# Patient Record
Sex: FEMALE | Race: ASIAN | Employment: OTHER | URBAN - METROPOLITAN AREA
[De-identification: names, ages, dates, MRNs, and addresses within clinical notes are randomized per-mention and may not be internally consistent; named-entity substitution may affect disease eponyms.]

---

## 2017-02-02 ENCOUNTER — TELEPHONE (OUTPATIENT)
Dept: FAMILY MEDICINE CLINIC | Facility: CLINIC | Age: 82
End: 2017-02-02

## 2017-06-19 ENCOUNTER — TELEPHONE (OUTPATIENT)
Dept: FAMILY MEDICINE CLINIC | Facility: CLINIC | Age: 82
End: 2017-06-19

## 2017-07-19 ENCOUNTER — TELEPHONE (OUTPATIENT)
Dept: FAMILY MEDICINE CLINIC | Facility: CLINIC | Age: 82
End: 2017-07-19

## 2017-09-20 ENCOUNTER — OFFICE VISIT (OUTPATIENT)
Dept: FAMILY MEDICINE CLINIC | Facility: CLINIC | Age: 82
End: 2017-09-20

## 2017-09-20 VITALS
BODY MASS INDEX: 32.79 KG/M2 | WEIGHT: 156.19 LBS | HEART RATE: 96 BPM | DIASTOLIC BLOOD PRESSURE: 84 MMHG | TEMPERATURE: 98 F | SYSTOLIC BLOOD PRESSURE: 126 MMHG | OXYGEN SATURATION: 99 % | HEIGHT: 58 IN | RESPIRATION RATE: 17 BRPM

## 2017-09-20 DIAGNOSIS — R00.0 TACHYCARDIA: ICD-10-CM

## 2017-09-20 DIAGNOSIS — S33.5XXA LUMBAR SPRAIN, INITIAL ENCOUNTER: Primary | ICD-10-CM

## 2017-09-20 DIAGNOSIS — K21.9 CHRONIC GERD: ICD-10-CM

## 2017-09-20 DIAGNOSIS — R26.9 GAIT ABNORMALITY: ICD-10-CM

## 2017-09-20 PROCEDURE — 99214 OFFICE O/P EST MOD 30 MIN: CPT | Performed by: FAMILY MEDICINE

## 2017-09-20 RX ORDER — CYCLOBENZAPRINE HCL 10 MG
5 TABLET ORAL 2 TIMES DAILY PRN
Qty: 30 TABLET | Refills: 0 | Status: SHIPPED | OUTPATIENT
Start: 2017-09-20 | End: 2017-10-10

## 2017-09-20 RX ORDER — PREDNISONE 1 MG/1
10 TABLET ORAL DAILY
COMMUNITY
End: 2018-08-24

## 2017-09-20 RX ORDER — IBUPROFEN 400 MG/1
400 TABLET ORAL EVERY 8 HOURS PRN
COMMUNITY
End: 2018-08-24

## 2017-09-20 NOTE — PROGRESS NOTES
Diane Downing is a [de-identified]year old female. HPI:  Patient presents with her daughter. Complains of left lower back pain for 3 weeks. Acute onset. Noted initially when she was trying to get up from a sitting position from a chair.  Tried some local heat i Smokeless tobacco: Never Used                      Alcohol use: No                            REVIEW OF SYSTEMS:  GENERAL HEALTH: feels well otherwise, mood is good  SKIN: denies any unusual skin lesions or rashes  RESPIRATORY: denies shortness home, advised to use walker for safety until pain is improved. Reviewed back maintenance. Patient lives by herself. Is a taxing effort to leave her home. Not able to drive due to pain.   will arrange for home health for skilled nursing evaluation and ho

## 2017-09-20 NOTE — TELEPHONE ENCOUNTER
Refused Prescriptions Disp Refills    DICLOFENAC SODIUM 50 MG Oral Tab EC [Pharmacy Med Name: DICLOFENAC SODIUM 50MG DR TABLETS] 180 tablet 0      Sig: TAKE 1 TABLET BY MOUTH TWICE DAILY FOR 1 WEEK, THEN TWICE DAILY AS NEEDED( TAKE WITH FOOD)        Refu

## 2017-09-21 ENCOUNTER — MED REC SCAN ONLY (OUTPATIENT)
Dept: FAMILY MEDICINE CLINIC | Facility: CLINIC | Age: 82
End: 2017-09-21

## 2017-09-21 ENCOUNTER — HOSPITAL ENCOUNTER (OUTPATIENT)
Dept: GENERAL RADIOLOGY | Age: 82
Discharge: HOME | End: 2017-09-21
Attending: FAMILY MEDICINE
Payer: MEDICARE

## 2017-09-21 ENCOUNTER — TELEPHONE (OUTPATIENT)
Dept: FAMILY MEDICINE CLINIC | Facility: CLINIC | Age: 82
End: 2017-09-21

## 2017-09-21 DIAGNOSIS — S33.5XXA LUMBAR SPRAIN, INITIAL ENCOUNTER: ICD-10-CM

## 2017-09-21 PROCEDURE — 72110 X-RAY EXAM L-2 SPINE 4/>VWS: CPT | Performed by: FAMILY MEDICINE

## 2017-09-21 NOTE — TELEPHONE ENCOUNTER
Faxed face sheet, last progress note and order for home health to [facility] home health fax # [phone]

## 2017-09-26 ENCOUNTER — TELEPHONE (OUTPATIENT)
Dept: FAMILY MEDICINE CLINIC | Facility: CLINIC | Age: 82
End: 2017-09-26

## 2017-09-27 NOTE — TELEPHONE ENCOUNTER
Spoke with patient's son, advised on results of lumbar spine x-rays consistent with moderate degenerative changes, most notable at L4-5 and L5-S1. No fx  Medications prescribed are helping. Patient is feeling better.   Physical therapy also seems to be hel

## 2017-10-02 NOTE — TELEPHONE ENCOUNTER
Pending Prescriptions Disp Refills    DICLOFENAC SODIUM 50 MG Oral Tab EC [Pharmacy Med Name: DICLOFENAC SODIUM 50MG DR TABLETS] 30 tablet 0     Sig: TAKE 1 TABLET BY MOUTH TWICE DAILY FOR 1 WEEK, THEN TWICE DAILY AS NEEDED( TAKE WITH FOOD)       Lov9/20

## 2017-10-03 NOTE — TELEPHONE ENCOUNTER
Lmtcb, when patient calls back will ask if she is requesting refill of cyclobenzaprine or if this is an automatic refill

## 2017-10-03 NOTE — TELEPHONE ENCOUNTER
Refused Prescriptions Disp Refills    DICLOFENAC SODIUM 50 MG Oral Tab EC [Pharmacy Med Name: DICLOFENAC SODIUM 50MG DR TABLETS] 180 tablet 0      Sig: TAKE 1 TABLET(50 MG) BY MOUTH TWICE DAILY AS NEEDED        Refused By: Trang Reeder        Reason

## 2017-10-06 RX ORDER — CYCLOBENZAPRINE HCL 10 MG
TABLET ORAL
Qty: 30 TABLET | Refills: 0 | OUTPATIENT
Start: 2017-10-06

## 2017-10-23 NOTE — TELEPHONE ENCOUNTER
Pending Prescriptions Disp Refills    DICLOFENAC SODIUM 50 MG Oral Tab EC [Pharmacy Med Name: DICLOFENAC SODIUM 50MG DR TABLETS] 40 tablet 0     Sig: TAKE 1 TABLET(50 MG) BY MOUTH TWICE DAILY AS NEEDED       refill denied as this was just filled 10/2/201

## 2018-05-31 ENCOUNTER — TELEPHONE (OUTPATIENT)
Dept: FAMILY MEDICINE CLINIC | Facility: CLINIC | Age: 83
End: 2018-05-31

## 2018-08-24 ENCOUNTER — LAB ENCOUNTER (OUTPATIENT)
Dept: LAB | Age: 83
End: 2018-08-24
Attending: FAMILY MEDICINE
Payer: MEDICARE

## 2018-08-24 ENCOUNTER — OFFICE VISIT (OUTPATIENT)
Dept: FAMILY MEDICINE CLINIC | Facility: CLINIC | Age: 83
End: 2018-08-24
Payer: MEDICARE

## 2018-08-24 VITALS
DIASTOLIC BLOOD PRESSURE: 72 MMHG | RESPIRATION RATE: 18 BRPM | HEIGHT: 58 IN | OXYGEN SATURATION: 98 % | HEART RATE: 108 BPM | SYSTOLIC BLOOD PRESSURE: 140 MMHG | WEIGHT: 175 LBS | TEMPERATURE: 98 F | BODY MASS INDEX: 36.73 KG/M2

## 2018-08-24 DIAGNOSIS — K21.9 CHRONIC GERD: ICD-10-CM

## 2018-08-24 DIAGNOSIS — M54.50 CHRONIC BILATERAL LOW BACK PAIN WITHOUT SCIATICA: ICD-10-CM

## 2018-08-24 DIAGNOSIS — R60.0 EDEMA OF RIGHT LOWER EXTREMITY: ICD-10-CM

## 2018-08-24 DIAGNOSIS — Z78.0 POSTMENOPAUSAL: ICD-10-CM

## 2018-08-24 DIAGNOSIS — Z13.31 DEPRESSION SCREENING: ICD-10-CM

## 2018-08-24 DIAGNOSIS — E78.49 OTHER HYPERLIPIDEMIA: ICD-10-CM

## 2018-08-24 DIAGNOSIS — Z00.00 ENCOUNTER FOR ANNUAL HEALTH EXAMINATION: Primary | ICD-10-CM

## 2018-08-24 DIAGNOSIS — M17.0 BILATERAL PRIMARY OSTEOARTHRITIS OF KNEE: ICD-10-CM

## 2018-08-24 DIAGNOSIS — E55.9 VITAMIN D DEFICIENCY: ICD-10-CM

## 2018-08-24 DIAGNOSIS — M77.8 RIGHT SHOULDER TENDINITIS: ICD-10-CM

## 2018-08-24 DIAGNOSIS — G89.29 CHRONIC BILATERAL LOW BACK PAIN WITHOUT SCIATICA: ICD-10-CM

## 2018-08-24 DIAGNOSIS — Z96.653 HISTORY OF TOTAL BILATERAL KNEE REPLACEMENT: ICD-10-CM

## 2018-08-24 DIAGNOSIS — I10 ESSENTIAL HYPERTENSION: ICD-10-CM

## 2018-08-24 DIAGNOSIS — H61.23 IMPACTED CERUMEN OF BOTH EARS: ICD-10-CM

## 2018-08-24 DIAGNOSIS — R26.9 ABNORMAL GAIT: ICD-10-CM

## 2018-08-24 DIAGNOSIS — H61.91 LESION OF SKIN OF RIGHT EAR: ICD-10-CM

## 2018-08-24 DIAGNOSIS — R63.5 WEIGHT GAIN: ICD-10-CM

## 2018-08-24 DIAGNOSIS — Z23 NEED FOR VACCINATION: ICD-10-CM

## 2018-08-24 DIAGNOSIS — R73.01 IFG (IMPAIRED FASTING GLUCOSE): ICD-10-CM

## 2018-08-24 DIAGNOSIS — M85.89 OSTEOPENIA OF MULTIPLE SITES: ICD-10-CM

## 2018-08-24 LAB
ALBUMIN SERPL-MCNC: 3.5 G/DL (ref 3.5–4.8)
ALBUMIN/GLOB SERPL: 0.8 {RATIO} (ref 1–2)
ALP LIVER SERPL-CCNC: 105 U/L (ref 55–142)
ALT SERPL-CCNC: 17 U/L (ref 14–54)
ANION GAP SERPL CALC-SCNC: 6 MMOL/L (ref 0–18)
AST SERPL-CCNC: 18 U/L (ref 15–41)
BASOPHILS # BLD AUTO: 0.05 X10(3) UL (ref 0–0.1)
BASOPHILS NFR BLD AUTO: 0.6 %
BILIRUB SERPL-MCNC: 0.3 MG/DL (ref 0.1–2)
BUN BLD-MCNC: 17 MG/DL (ref 8–20)
BUN/CREAT SERPL: 16 (ref 10–20)
CALCIUM BLD-MCNC: 9 MG/DL (ref 8.3–10.3)
CHLORIDE SERPL-SCNC: 105 MMOL/L (ref 101–111)
CHOLEST SMN-MCNC: 215 MG/DL (ref ?–200)
CO2 SERPL-SCNC: 28 MMOL/L (ref 22–32)
CREAT BLD-MCNC: 1.06 MG/DL (ref 0.55–1.02)
EOSINOPHIL # BLD AUTO: 0.23 X10(3) UL (ref 0–0.3)
EOSINOPHIL NFR BLD AUTO: 2.7 %
ERYTHROCYTE [DISTWIDTH] IN BLOOD BY AUTOMATED COUNT: 13.6 % (ref 11.5–16)
EST. AVERAGE GLUCOSE BLD GHB EST-MCNC: 137 MG/DL (ref 68–126)
GLOBULIN PLAS-MCNC: 4.3 G/DL (ref 2.5–4)
GLUCOSE BLD-MCNC: 94 MG/DL (ref 70–99)
HBA1C MFR BLD HPLC: 6.4 % (ref ?–5.7)
HCT VFR BLD AUTO: 47 % (ref 34–50)
HDLC SERPL-MCNC: 64 MG/DL (ref 40–59)
HGB BLD-MCNC: 14.5 G/DL (ref 12–16)
IMMATURE GRANULOCYTE COUNT: 0.03 X10(3) UL (ref 0–1)
IMMATURE GRANULOCYTE RATIO %: 0.3 %
LDLC SERPL CALC-MCNC: 118 MG/DL (ref ?–100)
LYMPHOCYTES # BLD AUTO: 2.3 X10(3) UL (ref 0.9–4)
LYMPHOCYTES NFR BLD AUTO: 26.6 %
M PROTEIN MFR SERPL ELPH: 7.8 G/DL (ref 6.1–8.3)
MCH RBC QN AUTO: 29.1 PG (ref 27–33.2)
MCHC RBC AUTO-ENTMCNC: 30.9 G/DL (ref 31–37)
MCV RBC AUTO: 94.4 FL (ref 81–100)
MONOCYTES # BLD AUTO: 0.73 X10(3) UL (ref 0.1–1)
MONOCYTES NFR BLD AUTO: 8.4 %
NEUTROPHIL ABS PRELIM: 5.32 X10 (3) UL (ref 1.3–6.7)
NEUTROPHILS # BLD AUTO: 5.32 X10(3) UL (ref 1.3–6.7)
NEUTROPHILS NFR BLD AUTO: 61.4 %
NONHDLC SERPL-MCNC: 151 MG/DL (ref ?–130)
OSMOLALITY SERPL CALC.SUM OF ELEC: 289 MOSM/KG (ref 275–295)
PLATELET # BLD AUTO: 252 10(3)UL (ref 150–450)
POTASSIUM SERPL-SCNC: 4.2 MMOL/L (ref 3.6–5.1)
RBC # BLD AUTO: 4.98 X10(6)UL (ref 3.8–5.1)
RED CELL DISTRIBUTION WIDTH-SD: 46.9 FL (ref 35.1–46.3)
SODIUM SERPL-SCNC: 139 MMOL/L (ref 136–144)
TRIGL SERPL-MCNC: 166 MG/DL (ref 30–149)
TSI SER-ACNC: 0.96 MIU/ML (ref 0.35–5.5)
VIT D+METAB SERPL-MCNC: 12.7 NG/ML (ref 30–100)
VLDLC SERPL CALC-MCNC: 33 MG/DL (ref 0–30)
WBC # BLD AUTO: 8.7 X10(3) UL (ref 4–13)

## 2018-08-24 PROCEDURE — 82306 VITAMIN D 25 HYDROXY: CPT

## 2018-08-24 PROCEDURE — 84443 ASSAY THYROID STIM HORMONE: CPT

## 2018-08-24 PROCEDURE — 80061 LIPID PANEL: CPT

## 2018-08-24 PROCEDURE — 90670 PCV13 VACCINE IM: CPT | Performed by: FAMILY MEDICINE

## 2018-08-24 PROCEDURE — G0444 DEPRESSION SCREEN ANNUAL: HCPCS | Performed by: FAMILY MEDICINE

## 2018-08-24 PROCEDURE — 99214 OFFICE O/P EST MOD 30 MIN: CPT | Performed by: FAMILY MEDICINE

## 2018-08-24 PROCEDURE — 80053 COMPREHEN METABOLIC PANEL: CPT

## 2018-08-24 PROCEDURE — 85025 COMPLETE CBC W/AUTO DIFF WBC: CPT

## 2018-08-24 PROCEDURE — 69210 REMOVE IMPACTED EAR WAX UNI: CPT | Performed by: FAMILY MEDICINE

## 2018-08-24 PROCEDURE — G0009 ADMIN PNEUMOCOCCAL VACCINE: HCPCS | Performed by: FAMILY MEDICINE

## 2018-08-24 PROCEDURE — G0439 PPPS, SUBSEQ VISIT: HCPCS | Performed by: FAMILY MEDICINE

## 2018-08-24 PROCEDURE — 83036 HEMOGLOBIN GLYCOSYLATED A1C: CPT

## 2018-08-24 RX ORDER — METOPROLOL SUCCINATE 100 MG/1
50 TABLET, EXTENDED RELEASE ORAL DAILY
Refills: 0 | COMMUNITY
Start: 2018-08-24 | End: 2019-07-07

## 2018-08-24 RX ORDER — HYDROCHLOROTHIAZIDE 12.5 MG/1
12.5 TABLET ORAL DAILY
Qty: 90 TABLET | Refills: 1 | Status: SHIPPED | OUTPATIENT
Start: 2018-08-24 | End: 2019-08-19

## 2018-08-24 RX ORDER — METOPROLOL TARTRATE 50 MG/1
50 TABLET, FILM COATED ORAL 2 TIMES DAILY
COMMUNITY
End: 2018-10-24

## 2018-08-24 RX ORDER — LORATADINE 10 MG/1
10 TABLET ORAL DAILY
COMMUNITY
End: 2020-09-29

## 2018-08-24 RX ORDER — MELOXICAM 15 MG/1
15 TABLET ORAL DAILY PRN
COMMUNITY

## 2018-08-24 NOTE — PATIENT INSTRUCTIONS
Sultana Hess's SCREENING SCHEDULE   Tests on this list are recommended by your physician but may not be covered, or covered at this frequency, by your insurer. Please check with your insurance carrier before scheduling to verify coverage.    Catherine Sinclair one of the following criteria:   • Men who are 73-68 years old and have smoked more than 100 cigarettes in their lifetime   • Anyone with a family history    Colorectal Cancer Screening  Covered up to Age 76     Colonoscopy Screen   Covered every 10 years- Please get this Mammogram regularly   Immunizations      Influenza  Covered Annually No orders found for this or any previous visit.  Please get every year    Pneumococcal 13 (Prevnar)  Covered Once after 65   Orders placed or performed in visit on 08/24/18 Directives.

## 2018-08-24 NOTE — PROGRESS NOTES
HPI:   Nelly Desouza is a 82year old female who presents for a Medicare Subsequent Annual Wellness visit (Pt already had Initial Annual Wellness).     Pt with h/o bilat TKR  Last was R TKR 2 years ago  C/o swelling in R foot, ankle and R lower leg fo as listed below:  She has no issues with dressing and bathing based on screening of functional status. She has Driving difficulties based on screening of functional status.    Driving: Cannot do without help   She has Meal Preparation difficulties ba MD (SURGERY, ORTHOPEDIC)    Patient Active Problem List:     Hyperlipidemia     Chronic GERD     IFG (impaired fasting glucose)     Bilateral primary osteoarthritis of knee     OAB (overactive bladder)     H/O total knee replacement     Status post total l fatigue  SKIN: has lesion on R ear for about 2 years, thinks bigger in size, no bleeding.    HEENT: ears feel plugged at times, runny nose  RESPIRATORY: denies shortness of breath with exertion, no cough  CARDIOVASCULAR: denies chest pain on exertion  GI: d auscultation bilaterally, respirations unlabored   Heart:  Regular rate and rhythm, S1 and S2 normal, no murmur, rub, or gallop   Abdomen:   Soft,obese, non-tender, bowel sounds active all four quadrants,  no masses, no organomegaly   Pelvic: Deferred   Ex indication. Administered Prevnar 13 today. -     PNEUMOCOCCAL VACC, 13 XAVI IM    Essential hypertension  Noted patient started on metoprolol per family doctor for elevated blood pressure readings about 1-2 months ago.   Reviewed diet/exercise/blood  pressur alone  States will d/w family and consider home health care    Vitamin D deficiency  rec Vitamin D3 2000 IU daily  -     VITAMIN D, 25-HYDROXY; Future    Other hyperlipidemia  No meds. Monitor w/ diet/exercise  -     LIPID PANEL;  Future    Weight gain  Lik Physical Exam only, or if medically necessary Electrocardiogram date06/15/2016       Colorectal Cancer Screening      Colonoscopy Screen every 10 years Pt deferred.  Reviewed indication Update Health Maintenance if applicable    Flex Sigmoidoscopy Screen ev Medicare Part B No vaccine history found This may be covered with your prescription benefits, but Medicare does not cover unless Medically needed    Zoster  Not covered by Medicare Part B  recommend shingles vaccine series (Shigrix) to be done at local pha

## 2018-09-14 RX ORDER — ERGOCALCIFEROL 1.25 MG/1
50000 CAPSULE ORAL WEEKLY
Qty: 12 CAPSULE | Refills: 0 | Status: SHIPPED | OUTPATIENT
Start: 2018-09-14 | End: 2018-12-13

## 2018-09-19 ENCOUNTER — TELEPHONE (OUTPATIENT)
Dept: FAMILY MEDICINE CLINIC | Facility: CLINIC | Age: 83
End: 2018-09-19

## 2018-09-19 NOTE — TELEPHONE ENCOUNTER
Faxed script with face-sheet. Conformation received. Supa Chavez, 09/19/18, 10:51 AM     Sent to scanning.

## 2018-09-19 NOTE — TELEPHONE ENCOUNTER
Spoke with son.  States LE is improved w/ HCTZ  Advised on recent labs   Labs show normal blood sugar.  Hemoglobin A1c for 3 month blood sugar average is elevated at 6.4, still in the prediabetes range, but very close to diabetic range. Monty Ramirez

## 2018-09-24 ENCOUNTER — TELEPHONE (OUTPATIENT)
Dept: FAMILY MEDICINE CLINIC | Facility: CLINIC | Age: 83
End: 2018-09-24

## 2018-09-24 NOTE — TELEPHONE ENCOUNTER
Copy of most recent office notes (8/24/18) sent to [facility] per their request for new referral.

## 2018-09-25 ENCOUNTER — TELEPHONE (OUTPATIENT)
Dept: FAMILY MEDICINE CLINIC | Facility: CLINIC | Age: 83
End: 2018-09-25

## 2018-09-25 NOTE — TELEPHONE ENCOUNTER
Nurse called back. Needs verbal orders asap. 3rd time she has called to confirm we have the message in to the Dr.    Asked if she can fax orders over - and she said not until she gets verbal to fill in the blanks on the Order Form. Pls Call.

## 2018-09-25 NOTE — TELEPHONE ENCOUNTER
Gave verbal orders. Informed by Luis Enrique Jama patient is able to do a lot for herself. Was able to reach under a table for her weights. May not be able to be seen with Medicare. But will see her as much as allowed. Luis Enrique Jama feels that the patient may be lonely.

## 2018-09-26 ENCOUNTER — TELEPHONE (OUTPATIENT)
Dept: FAMILY MEDICINE CLINIC | Facility: CLINIC | Age: 83
End: 2018-09-26

## 2018-09-26 NOTE — TELEPHONE ENCOUNTER
Faxed over Orders for [facility]- fax number [phone]. Conformation received.

## 2018-10-04 ENCOUNTER — TELEPHONE (OUTPATIENT)
Dept: FAMILY MEDICINE CLINIC | Facility: CLINIC | Age: 83
End: 2018-10-04

## 2018-10-04 NOTE — TELEPHONE ENCOUNTER
Cheyanne from [facility] is discharging Regino from [facility].

## 2018-10-10 ENCOUNTER — TELEPHONE (OUTPATIENT)
Dept: FAMILY MEDICINE CLINIC | Facility: CLINIC | Age: 83
End: 2018-10-10

## 2018-10-10 NOTE — TELEPHONE ENCOUNTER
[facility] for patient to advise Dr. Nelsy Guzman would like her to try taking the Hydrochlorothiazide medication every other day, instead of daily. Advised this may help with her swelling, but decrease so many trips to the BR.

## 2018-10-10 NOTE — TELEPHONE ENCOUNTER
VMML to request encouraging the patient to at least try taking the hydrochlorothiazide medication every other day instead of not taking it altogether. Advised a message was left for the patient also.

## 2018-10-24 ENCOUNTER — OFFICE VISIT (OUTPATIENT)
Dept: FAMILY MEDICINE CLINIC | Facility: CLINIC | Age: 83
End: 2018-10-24
Payer: MEDICARE

## 2018-10-24 VITALS
HEIGHT: 58 IN | SYSTOLIC BLOOD PRESSURE: 140 MMHG | DIASTOLIC BLOOD PRESSURE: 70 MMHG | BODY MASS INDEX: 36.94 KG/M2 | OXYGEN SATURATION: 97 % | RESPIRATION RATE: 18 BRPM | WEIGHT: 176 LBS | HEART RATE: 90 BPM | TEMPERATURE: 98 F

## 2018-10-24 DIAGNOSIS — K21.9 CHRONIC GERD: ICD-10-CM

## 2018-10-24 DIAGNOSIS — H53.10 SUBJECTIVE VISION DISTURBANCE, BILATERAL: ICD-10-CM

## 2018-10-24 DIAGNOSIS — I87.2 VENOUS INSUFFICIENCY OF RIGHT LOWER EXTREMITY: ICD-10-CM

## 2018-10-24 DIAGNOSIS — M15.9 PRIMARY OSTEOARTHRITIS INVOLVING MULTIPLE JOINTS: ICD-10-CM

## 2018-10-24 DIAGNOSIS — E78.2 MIXED HYPERLIPIDEMIA: ICD-10-CM

## 2018-10-24 DIAGNOSIS — M77.8 RIGHT SHOULDER TENDINITIS: ICD-10-CM

## 2018-10-24 DIAGNOSIS — R73.03 PREDIABETES: ICD-10-CM

## 2018-10-24 DIAGNOSIS — I10 ESSENTIAL HYPERTENSION: Primary | ICD-10-CM

## 2018-10-24 DIAGNOSIS — E55.9 VITAMIN D DEFICIENCY: ICD-10-CM

## 2018-10-24 PROCEDURE — 99214 OFFICE O/P EST MOD 30 MIN: CPT | Performed by: FAMILY MEDICINE

## 2018-10-24 PROCEDURE — 20610 DRAIN/INJ JOINT/BURSA W/O US: CPT | Performed by: FAMILY MEDICINE

## 2018-10-24 RX ORDER — IPRATROPIUM BROMIDE 42 UG/1
SPRAY, METERED NASAL
Refills: 10 | COMMUNITY
Start: 2018-09-17 | End: 2020-09-29

## 2018-10-24 RX ORDER — METOPROLOL SUCCINATE 100 MG/1
100 TABLET, EXTENDED RELEASE ORAL DAILY
Refills: 0 | Status: CANCELLED | OUTPATIENT
Start: 2018-10-24

## 2018-10-24 NOTE — PROGRESS NOTES
Nelly Desouza is a 82year old female.   HPI:  Pt is here with her, Dr. Kevin Guzman  Overall feeling better vs previous ov  R foot/ankle and R lower leg swelling is improved vs previous and HCTZ for a few days helped but then pt stopped med due to polyuria  us hypertension    • Hyperlipidemia    • Obesity    • Osteoarthritis        Past Surgical History:   Procedure Laterality Date   • KNEE REPLACEMENT SURGERY Bilateral    •            Social History    Tobacco Use      Smoking status: Never Smoker      Smok 12.5 mg daily. taking hydrochlorothiazide once daily will help with blood pressure as well as lower extremity edema.     2. Right shoulder tendinitis  Patient with significant limitation in range of motion of right shoulder, associated with pain, limiting daily    Advised annual flu vaccine, defers

## 2018-11-02 ENCOUNTER — TELEPHONE (OUTPATIENT)
Dept: FAMILY MEDICINE CLINIC | Facility: CLINIC | Age: 83
End: 2018-11-02

## 2018-11-02 NOTE — TELEPHONE ENCOUNTER
Ary RN from [facility] called to notify  that she saw pt today.   Notes bilateral edema, feet are somewhat bluish and cold to the touch    Pt has been non-compliant with her HCTZ but is trying to take more regularly this week    RN notes that pt can't get

## 2018-11-05 NOTE — TELEPHONE ENCOUNTER
Returned call to [facility]. Advised to encourage compliance with HCTZ daily. Verbal order given to continue OT/PT. Advised to fax order if they need it signed.

## 2018-11-06 RX ORDER — TRIAMCINOLONE ACETONIDE 40 MG/ML
40 INJECTION, SUSPENSION INTRA-ARTICULAR; INTRAMUSCULAR ONCE
Status: COMPLETED | OUTPATIENT
Start: 2018-10-24 | End: 2018-10-24

## 2018-12-12 ENCOUNTER — TELEPHONE (OUTPATIENT)
Dept: FAMILY MEDICINE CLINIC | Facility: CLINIC | Age: 83
End: 2018-12-12

## 2018-12-12 NOTE — TELEPHONE ENCOUNTER
LOV 10/24    Pt has been compliant with her HCTZ but is trying to take more regularly this week. She cordero gained weight. 111/30 172.8 12/ 3 174  Today 175.6 lb     Bilateral 1+ edema. Please advise.  Thank You

## 2018-12-14 NOTE — TELEPHONE ENCOUNTER
S/w son. States he was with pt yesterday and she states that she has been taking the hydrochlorothiazide daily. Also commented that her lower extremity edema was improved. Advised regarding weight gain.   Recommend compression stockings during the daytime

## 2018-12-19 NOTE — TELEPHONE ENCOUNTER
Please let Ary, Home Health nurse know that I s/w pt's son,. Dr. Caleb Valverde as per  Previous message. He will make sure patient takes her hydrochlorothiazide daily. Also discussed compression stockings for patient to use daily.   I also spoke with the robe

## 2018-12-19 NOTE — TELEPHONE ENCOUNTER
Detailed VMML for Sara Ramirez, [facility] nurse, regarding Dr. Watson Patience discussion with patient and her son. Patient needs to be compliant with HCTZ, elevate legs and try compression stockings.

## 2019-07-01 ENCOUNTER — TELEPHONE (OUTPATIENT)
Dept: FAMILY MEDICINE CLINIC | Facility: CLINIC | Age: 84
End: 2019-07-01

## 2019-07-01 NOTE — TELEPHONE ENCOUNTER
Called and spoke to patient's daughter for detail on condition. States swelling has been going on for several months. Noted recently one side is worse than the other and is bluish in color.   He mother has been on HCTZ, but stops taking it sometimes due t

## 2019-07-02 ENCOUNTER — LAB ENCOUNTER (OUTPATIENT)
Dept: LAB | Age: 84
End: 2019-07-02
Attending: FAMILY MEDICINE
Payer: MEDICARE

## 2019-07-02 ENCOUNTER — OFFICE VISIT (OUTPATIENT)
Dept: FAMILY MEDICINE CLINIC | Facility: CLINIC | Age: 84
End: 2019-07-02
Payer: MEDICARE

## 2019-07-02 VITALS
BODY MASS INDEX: 35.46 KG/M2 | SYSTOLIC BLOOD PRESSURE: 126 MMHG | DIASTOLIC BLOOD PRESSURE: 74 MMHG | RESPIRATION RATE: 16 BRPM | WEIGHT: 178.25 LBS | HEART RATE: 88 BPM | OXYGEN SATURATION: 96 % | HEIGHT: 59.45 IN | TEMPERATURE: 98 F

## 2019-07-02 DIAGNOSIS — R60.0 BILATERAL LEG EDEMA: ICD-10-CM

## 2019-07-02 DIAGNOSIS — I73.9 PERIPHERAL VASCULAR DISEASE (HCC): ICD-10-CM

## 2019-07-02 DIAGNOSIS — M47.896 OTHER OSTEOARTHRITIS OF SPINE, LUMBAR REGION: ICD-10-CM

## 2019-07-02 DIAGNOSIS — N32.81 OAB (OVERACTIVE BLADDER): ICD-10-CM

## 2019-07-02 DIAGNOSIS — E78.2 MIXED HYPERLIPIDEMIA: ICD-10-CM

## 2019-07-02 DIAGNOSIS — R60.0 BILATERAL LEG EDEMA: Primary | ICD-10-CM

## 2019-07-02 DIAGNOSIS — E55.9 VITAMIN D DEFICIENCY: ICD-10-CM

## 2019-07-02 DIAGNOSIS — R73.01 IFG (IMPAIRED FASTING GLUCOSE): ICD-10-CM

## 2019-07-02 DIAGNOSIS — M77.8 RIGHT SHOULDER TENDINITIS: ICD-10-CM

## 2019-07-02 DIAGNOSIS — I10 ESSENTIAL HYPERTENSION: ICD-10-CM

## 2019-07-02 DIAGNOSIS — M17.0 BILATERAL PRIMARY OSTEOARTHRITIS OF KNEE: ICD-10-CM

## 2019-07-02 DIAGNOSIS — R23.0 EXTREMITY CYANOSIS: ICD-10-CM

## 2019-07-02 LAB
ALBUMIN SERPL-MCNC: 3.8 G/DL (ref 3.4–5)
ALBUMIN/GLOB SERPL: 1 {RATIO} (ref 1–2)
ALP LIVER SERPL-CCNC: 93 U/L (ref 55–142)
ALT SERPL-CCNC: 15 U/L (ref 13–56)
ANION GAP SERPL CALC-SCNC: 7 MMOL/L (ref 0–18)
AST SERPL-CCNC: 16 U/L (ref 15–37)
BASOPHILS # BLD AUTO: 0.04 X10(3) UL (ref 0–0.2)
BASOPHILS NFR BLD AUTO: 0.5 %
BILIRUB SERPL-MCNC: 0.5 MG/DL (ref 0.1–2)
BUN BLD-MCNC: 18 MG/DL (ref 7–18)
BUN/CREAT SERPL: 15.8 (ref 10–20)
CALCIUM BLD-MCNC: 9.3 MG/DL (ref 8.5–10.1)
CHLORIDE SERPL-SCNC: 101 MMOL/L (ref 98–112)
CHOLEST SMN-MCNC: 199 MG/DL (ref ?–200)
CO2 SERPL-SCNC: 27 MMOL/L (ref 21–32)
CREAT BLD-MCNC: 1.14 MG/DL (ref 0.55–1.02)
DEPRECATED RDW RBC AUTO: 43.8 FL (ref 35.1–46.3)
EOSINOPHIL # BLD AUTO: 0.07 X10(3) UL (ref 0–0.7)
EOSINOPHIL NFR BLD AUTO: 0.8 %
ERYTHROCYTE [DISTWIDTH] IN BLOOD BY AUTOMATED COUNT: 12.8 % (ref 11–15)
EST. AVERAGE GLUCOSE BLD GHB EST-MCNC: 146 MG/DL (ref 68–126)
GLOBULIN PLAS-MCNC: 3.9 G/DL (ref 2.8–4.4)
GLUCOSE BLD-MCNC: 96 MG/DL (ref 70–99)
HBA1C MFR BLD HPLC: 6.7 % (ref ?–5.7)
HCT VFR BLD AUTO: 46.3 % (ref 35–48)
HDLC SERPL-MCNC: 68 MG/DL (ref 40–59)
HGB BLD-MCNC: 14.7 G/DL (ref 12–16)
IMM GRANULOCYTES # BLD AUTO: 0.03 X10(3) UL (ref 0–1)
IMM GRANULOCYTES NFR BLD: 0.3 %
LDLC SERPL CALC-MCNC: 110 MG/DL (ref ?–100)
LYMPHOCYTES # BLD AUTO: 2.79 X10(3) UL (ref 1–4)
LYMPHOCYTES NFR BLD AUTO: 31.6 %
M PROTEIN MFR SERPL ELPH: 7.7 G/DL (ref 6.4–8.2)
MCH RBC QN AUTO: 29.6 PG (ref 26–34)
MCHC RBC AUTO-ENTMCNC: 31.7 G/DL (ref 31–37)
MCV RBC AUTO: 93.3 FL (ref 80–100)
MONOCYTES # BLD AUTO: 0.75 X10(3) UL (ref 0.1–1)
MONOCYTES NFR BLD AUTO: 8.5 %
NEUTROPHILS # BLD AUTO: 5.16 X10 (3) UL (ref 1.5–7.7)
NEUTROPHILS # BLD AUTO: 5.16 X10(3) UL (ref 1.5–7.7)
NEUTROPHILS NFR BLD AUTO: 58.3 %
NONHDLC SERPL-MCNC: 131 MG/DL (ref ?–130)
OSMOLALITY SERPL CALC.SUM OF ELEC: 282 MOSM/KG (ref 275–295)
PLATELET # BLD AUTO: 250 10(3)UL (ref 150–450)
POTASSIUM SERPL-SCNC: 4.1 MMOL/L (ref 3.5–5.1)
RBC # BLD AUTO: 4.96 X10(6)UL (ref 3.8–5.3)
SODIUM SERPL-SCNC: 135 MMOL/L (ref 136–145)
TRIGL SERPL-MCNC: 105 MG/DL (ref 30–149)
TSI SER-ACNC: 1.48 MIU/ML (ref 0.36–3.74)
VLDLC SERPL CALC-MCNC: 21 MG/DL (ref 0–30)
WBC # BLD AUTO: 8.8 X10(3) UL (ref 4–11)

## 2019-07-02 PROCEDURE — 80061 LIPID PANEL: CPT

## 2019-07-02 PROCEDURE — 85025 COMPLETE CBC W/AUTO DIFF WBC: CPT

## 2019-07-02 PROCEDURE — 99214 OFFICE O/P EST MOD 30 MIN: CPT | Performed by: FAMILY MEDICINE

## 2019-07-02 PROCEDURE — 83036 HEMOGLOBIN GLYCOSYLATED A1C: CPT

## 2019-07-02 PROCEDURE — 84443 ASSAY THYROID STIM HORMONE: CPT

## 2019-07-02 PROCEDURE — 80053 COMPREHEN METABOLIC PANEL: CPT

## 2019-07-02 RX ORDER — FUROSEMIDE 20 MG/1
TABLET ORAL
Qty: 90 TABLET | Refills: 0 | Status: SHIPPED | OUTPATIENT
Start: 2019-07-02 | End: 2020-09-29

## 2019-07-02 RX ORDER — FUROSEMIDE 20 MG/1
TABLET ORAL
Qty: 30 TABLET | Refills: 1 | Status: SHIPPED | OUTPATIENT
Start: 2019-07-02 | End: 2019-07-02

## 2019-07-02 NOTE — TELEPHONE ENCOUNTER
Name from pharmacy: FUROSEMIDE 20MG TABLETS         Will file in chart as: FUROSEMIDE 20 MG Oral Tab    Sig: TAKE 1 TABLET BY MOUTH EVERY DAY FOR 1 WEEK, THEN EVERY DAY AS NEEDED AS DIRECTED    Disp:  90 tablet    Refills:  1    Start: 7/2/2019    Class:

## 2019-07-02 NOTE — PROGRESS NOTES
Nelly Desouza is a 82year old female. HPI:  Pt is here with her dtr.  Dr. Dillon Jimenes increased LE edema for about 1-2 months, R>L  Has h/o chronic LE edema but worse in last few weeks  dtr noted R foot looks bluish on toes  Pt denies any foot or leg • Obesity    • Osteoarthritis        Past Surgical History:   Procedure Laterality Date   • KNEE REPLACEMENT SURGERY Bilateral    •            Social History    Tobacco Use      Smoking status: Never Smoker      Smokeless tobacco: Never Used    [employer] with PVD  Decreased pulses RLE vs LLE and has some peripheral cyanosis or R foot  Pt not compliant with taking HCTZ daily  Will start Lasix 20 mg daily for 1 week, then daily as needed.   May need to take on a chronic basis pending response to treatment and refer for home health. Patient does not drive, and it is a taxing effort for patient to leave home, using assistive device and requires assistance of family members to bring her to the office to assist with ambulation, and insure her safety.

## 2019-07-03 ENCOUNTER — TELEPHONE (OUTPATIENT)
Dept: FAMILY MEDICINE CLINIC | Facility: CLINIC | Age: 84
End: 2019-07-03

## 2019-07-03 NOTE — TELEPHONE ENCOUNTER
Spoke with patient's daughter, Mode Lazo. Advised Dr. Michelle Nesbitt would like to have testing done sooner. Informed there are appts available at [facility] next week.  Informed the only other way to get the testing done sooner is to change the order to \"S

## 2019-07-03 NOTE — TELEPHONE ENCOUNTER
Please call Radiology and see if pt can get in ASAP for her arterial LE dopplers. dtr states earliest appt given was for 7/18. Need pt to get tesing done soon due to sxs.

## 2019-07-03 NOTE — TELEPHONE ENCOUNTER
Called Central Scheduling to verify availability of  US arterial doppler studies at any site. Informed Dr. Dalton Carranza does not want patient to wait two weeks. Scheduled checked and earliest appt. At [facility] is 7/18/19.  At [place] 7/17/19 and at [facility]

## 2019-07-07 RX ORDER — METOPROLOL SUCCINATE 50 MG/1
50 TABLET, EXTENDED RELEASE ORAL
Refills: 3 | COMMUNITY
Start: 2019-03-27

## 2019-07-08 ENCOUNTER — TELEPHONE (OUTPATIENT)
Dept: FAMILY MEDICINE CLINIC | Facility: CLINIC | Age: 84
End: 2019-07-08

## 2019-07-08 DIAGNOSIS — M77.8 RIGHT SHOULDER TENDINITIS: ICD-10-CM

## 2019-07-08 DIAGNOSIS — M15.9 PRIMARY OSTEOARTHRITIS INVOLVING MULTIPLE JOINTS: ICD-10-CM

## 2019-07-08 DIAGNOSIS — R26.9 ABNORMAL GAIT: ICD-10-CM

## 2019-07-08 DIAGNOSIS — Z96.653 HISTORY OF TOTAL BILATERAL KNEE REPLACEMENT: ICD-10-CM

## 2019-07-08 DIAGNOSIS — R60.0 BILATERAL LEG EDEMA: Primary | ICD-10-CM

## 2019-07-08 DIAGNOSIS — H53.10 SUBJECTIVE VISION DISTURBANCE, BILATERAL: ICD-10-CM

## 2019-07-08 RX ORDER — OXYBUTYNIN CHLORIDE 15 MG/1
15 TABLET, EXTENDED RELEASE ORAL DAILY
COMMUNITY
End: 2020-09-29 | Stop reason: DRUGHIGH

## 2019-07-08 NOTE — TELEPHONE ENCOUNTER
Please send order to [facility] GREGORY MEJÍA with pt face sheet and last PN  Pt needs skilled nursing and Home PT

## 2019-07-08 NOTE — TELEPHONE ENCOUNTER
Order placed for [facility] MIREILLE MEJÍA referral.  Faxed order, Face Sheet and OV note from 7/2/19 to [facility] MIREILLE MEJÍA at [phone].

## 2019-08-03 ENCOUNTER — MED REC SCAN ONLY (OUTPATIENT)
Dept: FAMILY MEDICINE CLINIC | Facility: CLINIC | Age: 84
End: 2019-08-03

## 2019-08-07 ENCOUNTER — TELEPHONE (OUTPATIENT)
Dept: FAMILY MEDICINE CLINIC | Facility: CLINIC | Age: 84
End: 2019-08-07

## 2019-09-04 ENCOUNTER — TELEPHONE (OUTPATIENT)
Dept: FAMILY MEDICINE CLINIC | Facility: CLINIC | Age: 84
End: 2019-09-04

## 2019-09-04 NOTE — TELEPHONE ENCOUNTER
From [facility]   FLAVIO patient doesn't want Home health anymore. She is being discharged.

## 2019-11-08 ENCOUNTER — PATIENT OUTREACH (OUTPATIENT)
Dept: CASE MANAGEMENT | Age: 84
End: 2019-11-08

## 2019-12-03 ENCOUNTER — TELEPHONE (OUTPATIENT)
Dept: FAMILY MEDICINE CLINIC | Facility: CLINIC | Age: 84
End: 2019-12-03

## 2019-12-03 NOTE — TELEPHONE ENCOUNTER
Pt's son brought Pt in for today's emmett. PSR left a vm earlier on both #\"s informing them Dr is out sick. Pt & son arrived for the appointment. Informed him Dr Leydi Guzman not in. No other emmett available with PA.   Patient [place] go [place] until [place] 202

## 2020-09-09 ENCOUNTER — TELEPHONE (OUTPATIENT)
Dept: FAMILY MEDICINE CLINIC | Facility: CLINIC | Age: 85
End: 2020-09-09

## 2020-09-29 ENCOUNTER — OFFICE VISIT (OUTPATIENT)
Dept: FAMILY MEDICINE CLINIC | Facility: CLINIC | Age: 85
End: 2020-09-29
Payer: MEDICARE

## 2020-09-29 VITALS
BODY MASS INDEX: 34.63 KG/M2 | TEMPERATURE: 97 F | WEIGHT: 165 LBS | HEART RATE: 88 BPM | SYSTOLIC BLOOD PRESSURE: 138 MMHG | RESPIRATION RATE: 14 BRPM | HEIGHT: 58 IN | DIASTOLIC BLOOD PRESSURE: 82 MMHG | OXYGEN SATURATION: 95 %

## 2020-09-29 DIAGNOSIS — H61.91 SKIN LESION OF RIGHT EXTERNAL EAR: ICD-10-CM

## 2020-09-29 DIAGNOSIS — H61.23 BILATERAL IMPACTED CERUMEN: ICD-10-CM

## 2020-09-29 DIAGNOSIS — Z13.31 SCREENING FOR DEPRESSION: ICD-10-CM

## 2020-09-29 DIAGNOSIS — Z00.00 ENCOUNTER FOR ANNUAL HEALTH EXAMINATION: Primary | ICD-10-CM

## 2020-09-29 DIAGNOSIS — M85.89 OSTEOPENIA OF MULTIPLE SITES: ICD-10-CM

## 2020-09-29 DIAGNOSIS — E78.2 MIXED HYPERLIPIDEMIA: ICD-10-CM

## 2020-09-29 DIAGNOSIS — N32.81 OAB (OVERACTIVE BLADDER): ICD-10-CM

## 2020-09-29 DIAGNOSIS — M77.8 RIGHT SHOULDER TENDINITIS: ICD-10-CM

## 2020-09-29 DIAGNOSIS — R63.4 WEIGHT LOSS: ICD-10-CM

## 2020-09-29 DIAGNOSIS — R73.01 IFG (IMPAIRED FASTING GLUCOSE): ICD-10-CM

## 2020-09-29 DIAGNOSIS — K21.9 CHRONIC GERD: ICD-10-CM

## 2020-09-29 DIAGNOSIS — H54.7 DECREASED VISUAL ACUITY: ICD-10-CM

## 2020-09-29 DIAGNOSIS — R73.09 ELEVATED HEMOGLOBIN A1C: ICD-10-CM

## 2020-09-29 DIAGNOSIS — M17.0 BILATERAL PRIMARY OSTEOARTHRITIS OF KNEE: ICD-10-CM

## 2020-09-29 DIAGNOSIS — Z23 NEED FOR PNEUMOCOCCAL VACCINATION: ICD-10-CM

## 2020-09-29 DIAGNOSIS — R60.0 EDEMA OF BOTH LOWER LEGS: ICD-10-CM

## 2020-09-29 DIAGNOSIS — H25.89 OTHER AGE-RELATED CATARACT, UNSPECIFIED LATERALITY: ICD-10-CM

## 2020-09-29 DIAGNOSIS — I10 ESSENTIAL HYPERTENSION: ICD-10-CM

## 2020-09-29 DIAGNOSIS — I87.2 VENOUS STASIS DERMATITIS OF BOTH LOWER EXTREMITIES: ICD-10-CM

## 2020-09-29 DIAGNOSIS — Z23 NEED FOR VACCINATION: ICD-10-CM

## 2020-09-29 PROCEDURE — 99213 OFFICE O/P EST LOW 20 MIN: CPT | Performed by: FAMILY MEDICINE

## 2020-09-29 PROCEDURE — G0444 DEPRESSION SCREEN ANNUAL: HCPCS | Performed by: FAMILY MEDICINE

## 2020-09-29 PROCEDURE — 90732 PPSV23 VACC 2 YRS+ SUBQ/IM: CPT | Performed by: FAMILY MEDICINE

## 2020-09-29 PROCEDURE — 90662 IIV NO PRSV INCREASED AG IM: CPT | Performed by: FAMILY MEDICINE

## 2020-09-29 PROCEDURE — G0009 ADMIN PNEUMOCOCCAL VACCINE: HCPCS | Performed by: FAMILY MEDICINE

## 2020-09-29 PROCEDURE — G0439 PPPS, SUBSEQ VISIT: HCPCS | Performed by: FAMILY MEDICINE

## 2020-09-29 PROCEDURE — G0008 ADMIN INFLUENZA VIRUS VAC: HCPCS | Performed by: FAMILY MEDICINE

## 2020-09-29 PROCEDURE — 69210 REMOVE IMPACTED EAR WAX UNI: CPT | Performed by: FAMILY MEDICINE

## 2020-09-29 RX ORDER — ERGOCALCIFEROL 1.25 MG/1
CAPSULE ORAL
COMMUNITY
End: 2021-04-07

## 2020-09-29 RX ORDER — OXYBUTYNIN CHLORIDE 5 MG/1
5 TABLET ORAL 2 TIMES DAILY
COMMUNITY
Start: 2020-09-15 | End: 2021-04-07

## 2020-09-29 NOTE — PROGRESS NOTES
HPI:   Chel Mendoza is a 82year old female who presents for a Subsequent Medicare Annual Wellness Visit. Pt is here with her son  Kentrell Renee overall is doing well. Has been social distancing, no known coronavirus contacts.   Appetite is dec Correct  Recall \"Ball\": Incorrect  Recall \"Flag\": Correct  Recall \"Tree\":  Incorrect       Functional Ability/Status   Sultana Hess has some abnormal functions as listed below:  She has Dressing and/or Bathing issues based on screening of functio advance directives standard forms performed Face to Face with patient and Family/surrogate (if present), and forms available to patient in AVS       She has never smoked tobacco.    CAGE Alcohol screening   Tina Jimena was screened for Alcohol abuse medical history of Esophageal reflux, Essential hypertension, Hyperlipidemia, Obesity, and Osteoarthritis. She  has a past surgical history that includes  and knee replacement surgery (Bilateral). Her family history is not on file.    SOCIAL HISTO conjunctiva clear, EOM's intact both eyes   Ears:   Dense cerumen impaction bilaterally, unable to visualize TMs. Right ear externally, at midportion, with nodular 1 cm dark hyperpigmented soft lesion, NT, soft on surface with base slightly keratotic.   No diet/exercise/skin care/safety/fall precautions/activities to keep cognition strong. Some language barrier to recall testing. States no issues with memory. Immunizations Reviewed:Pneumovax 23 and annual flu vaccines today, reviewed indications.    Advise orthopedic blood sugar specialist.  Patient defers at this time, but will consider for future. States home exercises to open. Topical analgesics as needed. Bilateral impacted cerumen  Symptomatic.   Advised removal  Cerumen Removal Procedure  Patient g past six months, have you lost more than 10 pounds without trying?: 2 - No  Has your appetite been poor?: No  How does the patient maintain a good energy level?: Other  How would you describe your daily physical activity?: None  How would you describe your Mammogram      Mammogram Annually to 76, then as discussed  patient defers Update Health Maintenance if applicable     Immunizations (Update Immunization Activity if applicable)     Influenza  Covered Annually 9/29/2020 Please get every year    Pneumococca

## 2020-09-29 NOTE — PATIENT INSTRUCTIONS
Sultana Hess's SCREENING SCHEDULE   Tests on this list are recommended by your physician but may not be covered, or covered at this frequency, by your insurer. Please check with your insurance carrier before scheduling to verify coverage.    Augustine Decree • Men who are 73-68 years old and have smoked more than 100 cigarettes in their lifetime   • Anyone with a family history    Colorectal Cancer Screening  Covered up to Age 76     Colonoscopy Screen   Covered every 10 years- more often if abnormal There a Immunizations      Influenza  Covered Annually Orders placed or performed in visit on 09/29/20   • FLU VACC HIGH DOSE PRSV FREE    Please get every year    Pneumococcal 13 (Prevnar)  Covered Once after 65 Orders placed or performed in visit on 08/24/18 Directives.

## 2020-10-14 PROBLEM — M85.89 OSTEOPENIA OF MULTIPLE SITES: Status: ACTIVE | Noted: 2020-10-14

## 2021-03-29 ENCOUNTER — TELEPHONE (OUTPATIENT)
Dept: FAMILY MEDICINE CLINIC | Facility: CLINIC | Age: 86
End: 2021-03-29

## 2021-03-30 NOTE — TELEPHONE ENCOUNTER
Called pts daughter back. Explained we have no appointments available this week with Dr. Antony Jackman as she is out of office. Dr. Rohith Beckman (covering) no available appointments either. Explained for any urgent needs to go directly to .  Left office number for pt

## 2021-04-01 NOTE — TELEPHONE ENCOUNTER
VMML for patient's daughter. Advised patient needs to be evaluated by Dr. Emmanuel Goodrich to see if there is a specific reason for incontinence. Is patient taking prescribed medication for Overactive Bladder? Is she going to use the bathroom regularly?   May need

## 2021-04-01 NOTE — TELEPHONE ENCOUNTER
Patient's daughter called back and had not listened to nurse's vm.  I read it to her. Dtr scheduled for 4/13. Pt is having demansia issues. dtr stated pt is urinating in her pants. Dtr. Is looking for some guidance.

## 2021-04-05 ENCOUNTER — TELEPHONE (OUTPATIENT)
Dept: FAMILY MEDICINE CLINIC | Facility: CLINIC | Age: 86
End: 2021-04-05

## 2021-04-05 NOTE — TELEPHONE ENCOUNTER
Patient's daughter stated pt is not doing well. Pt is scheduled for 4/13. Daughter would like pt to be seen sooner as  Pt Is falling while trying to walk and her memory is deteriorating.

## 2021-04-05 NOTE — TELEPHONE ENCOUNTER
4/5/21 10:51 AM  Note     Patient's daughter stated pt is not doing well. Pt is scheduled for 4/13. Daughter would like pt to be seen sooner as  Pt Is falling while trying to walk and her memory is deteriorating.          Called patient's daughter, Rohan Fong

## 2021-04-05 NOTE — TELEPHONE ENCOUNTER
S/w pt's daughter Bud Drummond at length. Pt has been having deterioration in her health over the last 3 months, corresponding to about the time pt's son has been ill and in the hospital with respiratory failure. This is likely a contributing factor.    Memory

## 2021-04-07 ENCOUNTER — OFFICE VISIT (OUTPATIENT)
Dept: FAMILY MEDICINE CLINIC | Facility: CLINIC | Age: 86
End: 2021-04-07
Payer: MEDICARE

## 2021-04-07 VITALS
DIASTOLIC BLOOD PRESSURE: 64 MMHG | WEIGHT: 155 LBS | TEMPERATURE: 97 F | RESPIRATION RATE: 14 BRPM | HEART RATE: 97 BPM | SYSTOLIC BLOOD PRESSURE: 116 MMHG | HEIGHT: 58 IN | OXYGEN SATURATION: 95 % | BODY MASS INDEX: 32.54 KG/M2

## 2021-04-07 DIAGNOSIS — N32.81 OAB (OVERACTIVE BLADDER): ICD-10-CM

## 2021-04-07 DIAGNOSIS — K21.9 GASTROESOPHAGEAL REFLUX DISEASE WITHOUT ESOPHAGITIS: ICD-10-CM

## 2021-04-07 DIAGNOSIS — R26.9 ABNORMAL GAIT: ICD-10-CM

## 2021-04-07 DIAGNOSIS — M77.8 RIGHT SHOULDER TENDINITIS: ICD-10-CM

## 2021-04-07 DIAGNOSIS — R35.89 POLYURIA: ICD-10-CM

## 2021-04-07 DIAGNOSIS — K42.9 UMBILICAL HERNIA WITHOUT OBSTRUCTION AND WITHOUT GANGRENE: ICD-10-CM

## 2021-04-07 DIAGNOSIS — R53.1 GENERALIZED WEAKNESS: ICD-10-CM

## 2021-04-07 DIAGNOSIS — N39.498 OTHER URINARY INCONTINENCE: ICD-10-CM

## 2021-04-07 DIAGNOSIS — S00.03XA CONTUSION OF SCALP, INITIAL ENCOUNTER: ICD-10-CM

## 2021-04-07 DIAGNOSIS — R41.3 MEMORY DEFICIT: ICD-10-CM

## 2021-04-07 DIAGNOSIS — R73.01 IFG (IMPAIRED FASTING GLUCOSE): ICD-10-CM

## 2021-04-07 DIAGNOSIS — E78.2 MIXED HYPERLIPIDEMIA: ICD-10-CM

## 2021-04-07 DIAGNOSIS — H61.23 IMPACTED CERUMEN, BILATERAL: ICD-10-CM

## 2021-04-07 DIAGNOSIS — F32.9 REACTIVE DEPRESSION: ICD-10-CM

## 2021-04-07 DIAGNOSIS — Z91.81 HISTORY OF FALL: ICD-10-CM

## 2021-04-07 DIAGNOSIS — R63.4 WEIGHT LOSS: ICD-10-CM

## 2021-04-07 DIAGNOSIS — I10 ESSENTIAL HYPERTENSION: Primary | ICD-10-CM

## 2021-04-07 DIAGNOSIS — M17.0 BILATERAL PRIMARY OSTEOARTHRITIS OF KNEE: ICD-10-CM

## 2021-04-07 DIAGNOSIS — E55.9 VITAMIN D DEFICIENCY: ICD-10-CM

## 2021-04-07 PROCEDURE — 81003 URINALYSIS AUTO W/O SCOPE: CPT | Performed by: FAMILY MEDICINE

## 2021-04-07 PROCEDURE — 87086 URINE CULTURE/COLONY COUNT: CPT | Performed by: FAMILY MEDICINE

## 2021-04-07 PROCEDURE — 69210 REMOVE IMPACTED EAR WAX UNI: CPT | Performed by: FAMILY MEDICINE

## 2021-04-07 PROCEDURE — 81001 URINALYSIS AUTO W/SCOPE: CPT | Performed by: FAMILY MEDICINE

## 2021-04-07 PROCEDURE — 99215 OFFICE O/P EST HI 40 MIN: CPT | Performed by: FAMILY MEDICINE

## 2021-04-07 RX ORDER — SOLIFENACIN SUCCINATE 5 MG/1
5 TABLET, FILM COATED ORAL DAILY
Qty: 30 TABLET | Refills: 2 | Status: SHIPPED | OUTPATIENT
Start: 2021-04-07 | End: 2021-10-22

## 2021-04-07 RX ORDER — DONEPEZIL HYDROCHLORIDE 5 MG/1
5 TABLET, FILM COATED ORAL NIGHTLY
Qty: 30 TABLET | Refills: 2 | Status: SHIPPED | OUTPATIENT
Start: 2021-04-07

## 2021-04-07 NOTE — PROGRESS NOTES
Luisa Caruso is a 82year old female. HPI:  Pt is here with dtr Jean-Pierre. For the past few months has noticed worsening memory. Is more forgetful of things which was mild over the last 1-2 years but now seems to be progressively getting worse.    Has intermittent flares. Meloxicam as needed helps. Does not use daily. Right shoulder with limited range of motion, chronic. History of tendinitis. No numbness or tingling. Difficulty with getting dressed and back reaching and over reaching activities. 14   Ht 4' 10\" (1.473 m)   Wt 155 lb (70.3 kg)   SpO2 95%   BMI 32.40 kg/m²   Wt Readings from Last 6 Encounters:  04/09/21 : 155 lb (70.3 kg)  04/07/21 : 155 lb (70.3 kg)  09/29/20 : 165 lb (74.8 kg)  07/02/19 : 178 lb 4 oz (80.9 kg)  10/24/18 : 176 lb ( METABOLIC PANEL (14); Future    2. Memory deficit  Memory has declined over time. Clinically with dementia and likely multifactorial. Ck labs and imaging as ordered. Discussed option for meds and pt/family would like start for now. Monitor.    Discussed p SERUM(FOLATE); Future    13. Impacted cerumen, bilateral  Sxic, advised removal and pt consents. PROCEDURE:  Cerumen Removal Procedure  Patient gave verbal consent.   Risks and Benefits of removal were discussed with the patient, who agreed to proceed with for patient. Patient is capable and willing to have a wheelchair, and does have a caregiver as well as family members to assist.  Will need elevated leg rests, seat belt, Anti-tip wheels and seat and back cushions as accessories to her wheelchair.   JAMIE sweet

## 2021-04-08 ENCOUNTER — LAB ENCOUNTER (OUTPATIENT)
Dept: LAB | Age: 86
End: 2021-04-08
Attending: FAMILY MEDICINE
Payer: MEDICARE

## 2021-04-08 DIAGNOSIS — R53.1 GENERALIZED WEAKNESS: ICD-10-CM

## 2021-04-08 DIAGNOSIS — R35.89 POLYURIA: ICD-10-CM

## 2021-04-08 DIAGNOSIS — I10 ESSENTIAL HYPERTENSION: ICD-10-CM

## 2021-04-08 DIAGNOSIS — R73.01 IFG (IMPAIRED FASTING GLUCOSE): ICD-10-CM

## 2021-04-08 DIAGNOSIS — E55.9 VITAMIN D DEFICIENCY: ICD-10-CM

## 2021-04-08 DIAGNOSIS — E78.2 MIXED HYPERLIPIDEMIA: ICD-10-CM

## 2021-04-08 DIAGNOSIS — R41.3 MEMORY DEFICIT: ICD-10-CM

## 2021-04-08 DIAGNOSIS — R63.4 WEIGHT LOSS: ICD-10-CM

## 2021-04-08 PROCEDURE — 36415 COLL VENOUS BLD VENIPUNCTURE: CPT

## 2021-04-08 PROCEDURE — 82607 VITAMIN B-12: CPT

## 2021-04-08 PROCEDURE — 83036 HEMOGLOBIN GLYCOSYLATED A1C: CPT

## 2021-04-08 PROCEDURE — 84443 ASSAY THYROID STIM HORMONE: CPT

## 2021-04-08 PROCEDURE — 82306 VITAMIN D 25 HYDROXY: CPT

## 2021-04-08 PROCEDURE — 82746 ASSAY OF FOLIC ACID SERUM: CPT

## 2021-04-08 PROCEDURE — 80053 COMPREHEN METABOLIC PANEL: CPT

## 2021-04-08 PROCEDURE — 85025 COMPLETE CBC W/AUTO DIFF WBC: CPT

## 2021-04-08 PROCEDURE — 80061 LIPID PANEL: CPT

## 2021-04-09 ENCOUNTER — TELEPHONE (OUTPATIENT)
Dept: FAMILY MEDICINE CLINIC | Facility: CLINIC | Age: 86
End: 2021-04-09

## 2021-04-09 ENCOUNTER — HOSPITAL ENCOUNTER (EMERGENCY)
Facility: HOSPITAL | Age: 86
Discharge: HOME | End: 2021-04-09
Attending: EMERGENCY MEDICINE
Payer: MEDICARE

## 2021-04-09 ENCOUNTER — APPOINTMENT (OUTPATIENT)
Dept: CT IMAGING | Facility: HOSPITAL | Age: 86
End: 2021-04-09
Attending: EMERGENCY MEDICINE
Payer: MEDICARE

## 2021-04-09 VITALS
RESPIRATION RATE: 16 BRPM | HEIGHT: 61 IN | OXYGEN SATURATION: 99 % | HEART RATE: 80 BPM | DIASTOLIC BLOOD PRESSURE: 89 MMHG | BODY MASS INDEX: 29.27 KG/M2 | WEIGHT: 155 LBS | TEMPERATURE: 98 F | SYSTOLIC BLOOD PRESSURE: 145 MMHG

## 2021-04-09 DIAGNOSIS — R41.3 MEMORY DEFICIT: Primary | ICD-10-CM

## 2021-04-09 DIAGNOSIS — N39.498 OTHER URINARY INCONTINENCE: ICD-10-CM

## 2021-04-09 DIAGNOSIS — Z91.81 HISTORY OF FALL: ICD-10-CM

## 2021-04-09 DIAGNOSIS — S00.03XA CONTUSION OF SCALP, INITIAL ENCOUNTER: ICD-10-CM

## 2021-04-09 DIAGNOSIS — S00.03XA CONTUSION OF OCCIPITAL REGION OF SCALP, INITIAL ENCOUNTER: Primary | ICD-10-CM

## 2021-04-09 DIAGNOSIS — R53.1 GENERALIZED WEAKNESS: ICD-10-CM

## 2021-04-09 DIAGNOSIS — R26.9 ABNORMAL GAIT: ICD-10-CM

## 2021-04-09 DIAGNOSIS — M17.0 BILATERAL PRIMARY OSTEOARTHRITIS OF KNEE: ICD-10-CM

## 2021-04-09 DIAGNOSIS — M77.8 RIGHT SHOULDER TENDINITIS: ICD-10-CM

## 2021-04-09 DIAGNOSIS — I10 ESSENTIAL HYPERTENSION: ICD-10-CM

## 2021-04-09 PROCEDURE — 93010 ELECTROCARDIOGRAM REPORT: CPT

## 2021-04-09 PROCEDURE — 85025 COMPLETE CBC W/AUTO DIFF WBC: CPT | Performed by: EMERGENCY MEDICINE

## 2021-04-09 PROCEDURE — 72125 CT NECK SPINE W/O DYE: CPT | Performed by: EMERGENCY MEDICINE

## 2021-04-09 PROCEDURE — 99285 EMERGENCY DEPT VISIT HI MDM: CPT

## 2021-04-09 PROCEDURE — 70450 CT HEAD/BRAIN W/O DYE: CPT | Performed by: EMERGENCY MEDICINE

## 2021-04-09 PROCEDURE — 80053 COMPREHEN METABOLIC PANEL: CPT | Performed by: EMERGENCY MEDICINE

## 2021-04-09 PROCEDURE — 36415 COLL VENOUS BLD VENIPUNCTURE: CPT

## 2021-04-09 PROCEDURE — 93005 ELECTROCARDIOGRAM TRACING: CPT

## 2021-04-09 NOTE — TELEPHONE ENCOUNTER
Home health orders, pt face sheet and last progress note faxed to [address] [place] [address] home care at [phone], confirmation fax received.

## 2021-04-09 NOTE — TELEPHONE ENCOUNTER
Incoming call transferred to triage, spoke to pts daughter who indicated she was going to call ems for pt. Daughter states caregiver was not in today and pt was left alone to care for herself.  Pts daughter stated she was concerned as daughter had a fall x6

## 2021-04-09 NOTE — ED INITIAL ASSESSMENT (HPI)
Pt here for evaluation s/p fall last week with increase in fatigue and decrease in mobility   Per daughter, Israel Rico Saturday she fell while I was there. She fell backwards. I think maybe she hit her head on the dining room table.  Her head was bleeding and s

## 2021-04-09 NOTE — TELEPHONE ENCOUNTER
Please fax [phone] [facility], along with pt face sheet and last progress note to [facility] GREGORY FAGAN

## 2021-04-10 NOTE — ED PROVIDER NOTES
Patient Seen in: [facility] Emergency Department      History   Patient presents with:  Fatigue    Stated Complaint: fatigue    HPI/Subjective:   HPI    Patient is an 27-year-old female here with her daughter concerned about a head injury that occur appearing, non-toxic  Head: Normocephalic , small contusion on the occiput, no bleeding. Eyes: EOM are normal. Pupils are equal, round, and reactive to light. Neck: Normal range of motion. Neck supple. No JVD present.      Cardiovascular: Normal rate a Blood reviewed. Mild hyponatremia. Mild hyperglycemia. No DKA. PCP notes reviewed. MDM      I have personally visualized the Radiology studies and agree with interpretation from radiology.     CT BRAIN OR HEAD (49545)    Result Date: 4/9/202 Noncontrast CT scanning of the cervical spine is performed from the skull base through C7. Multiplanar reconstructions are generated. Dose reduction techniques were used.  Dose information is transmitted to the ACR [employer] of Radiology) NRDR (Na

## 2021-04-15 ENCOUNTER — TELEPHONE (OUTPATIENT)
Dept: FAMILY MEDICINE CLINIC | Facility: CLINIC | Age: 86
End: 2021-04-15

## 2021-04-15 RX ORDER — ERGOCALCIFEROL 1.25 MG/1
50000 CAPSULE ORAL WEEKLY
Qty: 12 CAPSULE | Refills: 0 | Status: SHIPPED | OUTPATIENT
Start: 2021-04-15 | End: 2021-05-15

## 2021-04-15 RX ORDER — FOLIC ACID 1 MG/1
1 TABLET ORAL DAILY
Qty: 90 TABLET | Refills: 0 | Status: SHIPPED | OUTPATIENT
Start: 2021-04-15

## 2021-04-15 RX ORDER — CYANOCOBALAMIN 1000 UG/ML
INJECTION INTRAMUSCULAR; SUBCUTANEOUS
Qty: 10 ML | Refills: 2 | Status: SHIPPED | OUTPATIENT
Start: 2021-04-15

## 2021-04-15 RX ORDER — ERGOCALCIFEROL 1.25 MG/1
50000 CAPSULE ORAL WEEKLY
Qty: 12 CAPSULE | Refills: 0 | Status: SHIPPED | OUTPATIENT
Start: 2021-04-15 | End: 2021-04-15

## 2021-04-15 NOTE — TELEPHONE ENCOUNTER
Discussed with dtr update after recent ER visit. Noted w/u  States pt status about the same. Home health seeing pt. Started PT/OT. Caregiver and family are with pt regularly.   Advised based on last labs, and as previously advised,  will start Rx Vitamin

## 2021-04-19 ENCOUNTER — TELEPHONE (OUTPATIENT)
Dept: FAMILY MEDICINE CLINIC | Facility: CLINIC | Age: 86
End: 2021-04-19

## 2021-04-21 ENCOUNTER — MED REC SCAN ONLY (OUTPATIENT)
Dept: FAMILY MEDICINE CLINIC | Facility: CLINIC | Age: 86
End: 2021-04-21

## 2021-05-17 ENCOUNTER — MED REC SCAN ONLY (OUTPATIENT)
Dept: FAMILY MEDICINE CLINIC | Facility: CLINIC | Age: 86
End: 2021-05-17

## 2021-06-01 ENCOUNTER — MED REC SCAN ONLY (OUTPATIENT)
Dept: FAMILY MEDICINE CLINIC | Facility: CLINIC | Age: 86
End: 2021-06-01

## 2021-06-07 ENCOUNTER — TELEPHONE (OUTPATIENT)
Dept: FAMILY MEDICINE CLINIC | Facility: CLINIC | Age: 86
End: 2021-06-07

## 2021-06-09 NOTE — TELEPHONE ENCOUNTER
Received request from [facility] for more specific information in note. OV note addendum added by Dr. Antony Jackman. Note faxed to [facility] at [phone]. Confirmation fax received.

## 2021-06-17 ENCOUNTER — TELEPHONE (OUTPATIENT)
Dept: FAMILY MEDICINE CLINIC | Facility: CLINIC | Age: 86
End: 2021-06-17

## 2021-06-17 NOTE — TELEPHONE ENCOUNTER
VMML for patient's daughter requesting a return call to review patient's current medications. [facility] is requesting a copy and we want to make sure our list is accurate.

## 2021-07-29 ENCOUNTER — TELEPHONE (OUTPATIENT)
Dept: FAMILY MEDICINE CLINIC | Facility: CLINIC | Age: 86
End: 2021-07-29

## 2021-07-29 NOTE — TELEPHONE ENCOUNTER
Epic nurse asked for 3 items to be faxed back. Discharge summary from OT  Initial orders for home health care: 6/11/21-8/9/21.     And Face to Face encounter form

## 2021-08-02 NOTE — TELEPHONE ENCOUNTER
[facility] [phone] as requested. Confirmation fax received.

## 2021-08-03 ENCOUNTER — MED REC SCAN ONLY (OUTPATIENT)
Dept: FAMILY MEDICINE CLINIC | Facility: CLINIC | Age: 86
End: 2021-08-03

## 2021-08-03 NOTE — TELEPHONE ENCOUNTER
Epic nurse asked for OT DC be signed by Dr. Nelsy Guzman. Dr. Parnell Poag. Signed form and it was faxed to Epic nurse. Placed in scanning.

## 2021-09-10 ENCOUNTER — TELEPHONE (OUTPATIENT)
Dept: FAMILY MEDICINE CLINIC | Facility: CLINIC | Age: 86
End: 2021-09-10

## 2021-09-10 NOTE — TELEPHONE ENCOUNTER
Arcenio is looking for plan of care for patient to be signed. She said she dropped off to the office on 9/9. Please call Arcenio when signed.

## 2021-09-16 ENCOUNTER — MED REC SCAN ONLY (OUTPATIENT)
Dept: FAMILY MEDICINE CLINIC | Facility: CLINIC | Age: 86
End: 2021-09-16

## 2021-10-07 ENCOUNTER — TELEPHONE (OUTPATIENT)
Dept: FAMILY MEDICINE CLINIC | Facility: CLINIC | Age: 86
End: 2021-10-07

## 2021-10-07 DIAGNOSIS — E53.8 B12 DEFICIENCY: ICD-10-CM

## 2021-10-07 DIAGNOSIS — E87.1 HYPONATREMIA: ICD-10-CM

## 2021-10-07 DIAGNOSIS — H25.89 OTHER AGE-RELATED CATARACT, UNSPECIFIED LATERALITY: Primary | ICD-10-CM

## 2021-10-07 DIAGNOSIS — I10 ESSENTIAL HYPERTENSION: ICD-10-CM

## 2021-10-07 DIAGNOSIS — R73.01 IFG (IMPAIRED FASTING GLUCOSE): ICD-10-CM

## 2021-10-07 DIAGNOSIS — E53.8 FOLIC ACID DEFICIENCY: ICD-10-CM

## 2021-10-07 DIAGNOSIS — H54.7 DECREASED VISUAL ACUITY: ICD-10-CM

## 2021-10-07 NOTE — TELEPHONE ENCOUNTER
Family is asking for new referral for a Eye Doctor? Family also asking if needs to continue with B-12 injections and taking Folic Acid?

## 2021-10-08 NOTE — TELEPHONE ENCOUNTER
D/w home health NurseArcenio. Continue folic acid supplement daily. Can stop vitamin B12 injections for now and take vitamin B12 supplement orally, 1000 mcg daily. New referral placed for ophthalmology. Information given.     Also states Solifenacin dose

## 2021-10-11 NOTE — TELEPHONE ENCOUNTER
Lab orders faxed to [facility] GREGORY MEJÍA as requested. Confirmation fax received.

## 2021-10-22 ENCOUNTER — TELEPHONE (OUTPATIENT)
Dept: FAMILY MEDICINE CLINIC | Facility: CLINIC | Age: 86
End: 2021-10-22

## 2021-10-22 RX ORDER — SOLIFENACIN SUCCINATE 10 MG/1
10 TABLET, FILM COATED ORAL DAILY
COMMUNITY
Start: 2021-09-27

## 2021-10-22 RX ORDER — ESCITALOPRAM OXALATE 10 MG/1
10 TABLET ORAL DAILY
COMMUNITY
Start: 2021-10-15

## 2021-10-22 NOTE — TELEPHONE ENCOUNTER
Reviewed labs from [facility] from 10/19/2021. Folic acid level above normal.  Discontinue folic acid supplement. Vitamin B12 level improved. Continue vitamin B12 supplement daily. Hemoglobin A1c 6.2. Improved. Fasting blood sugar okay.    Getting h

## 2021-10-23 ENCOUNTER — MED REC SCAN ONLY (OUTPATIENT)
Dept: FAMILY MEDICINE CLINIC | Facility: CLINIC | Age: 86
End: 2021-10-23

## 2021-10-25 ENCOUNTER — TELEPHONE (OUTPATIENT)
Dept: FAMILY MEDICINE CLINIC | Facility: CLINIC | Age: 86
End: 2021-10-25

## 2021-11-02 NOTE — TELEPHONE ENCOUNTER
CHIEF COMPLAINT:  6 month Postoperative visit    SUBJECTIVE:  Elaina Becerra is a 63 year old female who returns for follow-up visit after removal of condyloma 3/31/2021 (Bx: condyloma w/patchy dysplasia).  Due for anoscopy.   She states she is doing great, feeling good, but she requests the cream.  Last time she was here she requested this.  This is a cream that can only be used for 3 months.  No bleeding now or other symptoms.      Surgery 3/31/2021.  EXCISION EXTENSIVE ANAL CONDYLOMA    DATE:  3/31/2021:   Pathologic Diagnosis   Perianal tissue, excision of multiple lesions:   -Condylomata acuminata with patchy moderate dysplasia         OBJECTIVE:  Blood pressure (!) 149/95, pulse 77, temperature 97.3 °F (36.3 °C), temperature source Oral, height 5' 7\" (1.702 m), weight 85.5 kg (188 lb 6.4 oz).  Abdomen soft/NT (nontender), good BS (bowel sounds), nondistended.   Anorectal:  There may be the slightest on the right side almost too small to tell, which she experienced pressure on exam.  This area is questionable.      ASSESSMENT/PLAN:Anal Condyloma   Questionable early recurrence.  Giving a second course of Aldara cream, sent to her pharmacy.     I want her to return in 4-5 months for followup.         On 11/10/2021, I, Gila Plaza, scribed the services personally performed by Curt VAUGHN MD.      I have reviewed and edited the visit summary above and attest that it is accurate.    Curt Sousa MD     Dr. Josephine Hopkins,  Please advise if Vitamin B12 injections should be continued. Ned Denton MD     4/15/21 11:16 AM  Note  Discussed with dtr update after recent ER visit. Noted w/u  States pt status about the same. Home health seeing pt. Started PT/OT.

## 2021-11-09 ENCOUNTER — MED REC SCAN ONLY (OUTPATIENT)
Dept: FAMILY MEDICINE CLINIC | Facility: CLINIC | Age: 86
End: 2021-11-09

## 2021-11-19 ENCOUNTER — HOSPITAL ENCOUNTER (EMERGENCY)
Facility: HOSPITAL | Age: 86
Discharge: HOME | End: 2021-11-20
Attending: EMERGENCY MEDICINE
Payer: MEDICARE

## 2021-11-19 ENCOUNTER — APPOINTMENT (OUTPATIENT)
Dept: CT IMAGING | Facility: HOSPITAL | Age: 86
End: 2021-11-19
Attending: EMERGENCY MEDICINE
Payer: MEDICARE

## 2021-11-19 DIAGNOSIS — R33.9 URINARY RETENTION: Primary | ICD-10-CM

## 2021-11-19 PROCEDURE — 99285 EMERGENCY DEPT VISIT HI MDM: CPT

## 2021-11-19 PROCEDURE — 99284 EMERGENCY DEPT VISIT MOD MDM: CPT

## 2021-11-19 PROCEDURE — 83690 ASSAY OF LIPASE: CPT | Performed by: EMERGENCY MEDICINE

## 2021-11-19 PROCEDURE — 80053 COMPREHEN METABOLIC PANEL: CPT | Performed by: EMERGENCY MEDICINE

## 2021-11-19 PROCEDURE — 96360 HYDRATION IV INFUSION INIT: CPT

## 2021-11-19 PROCEDURE — 51702 INSERT TEMP BLADDER CATH: CPT

## 2021-11-19 PROCEDURE — 81003 URINALYSIS AUTO W/O SCOPE: CPT | Performed by: EMERGENCY MEDICINE

## 2021-11-19 PROCEDURE — 85025 COMPLETE CBC W/AUTO DIFF WBC: CPT | Performed by: EMERGENCY MEDICINE

## 2021-11-19 PROCEDURE — 74177 CT ABD & PELVIS W/CONTRAST: CPT | Performed by: EMERGENCY MEDICINE

## 2021-11-19 RX ORDER — SODIUM CHLORIDE 9 MG/ML
INJECTION, SOLUTION INTRAVENOUS CONTINUOUS
Status: DISCONTINUED | OUTPATIENT
Start: 2021-11-19 | End: 2021-11-20

## 2021-11-20 VITALS
HEART RATE: 74 BPM | TEMPERATURE: 98 F | SYSTOLIC BLOOD PRESSURE: 160 MMHG | BODY MASS INDEX: 28.32 KG/M2 | WEIGHT: 150 LBS | OXYGEN SATURATION: 93 % | RESPIRATION RATE: 20 BRPM | HEIGHT: 61 IN | DIASTOLIC BLOOD PRESSURE: 87 MMHG

## 2021-11-20 NOTE — ED QUICK NOTES
Rounding Completed    Plan of Care reviewed. Waiting for family contact for discharge. Elimination needs assessed. Bed is locked and in lowest position. Call light within reach.

## 2021-11-20 NOTE — ED INITIAL ASSESSMENT (HPI)
Pt presents to ED with abdominal pain. Pt has been unable to urinate x 24 hours. Family states some distention.

## 2021-11-20 NOTE — ED QUICK NOTES
Contact information for family provided by EMS:    Tcmedina David  - [phone]

## 2021-11-20 NOTE — ED QUICK NOTES
Spoke with patients' daughter regarding patient discharge. Daughter reported that no one was at home to take care of patient  And patient could not be left alone. Daughter reported no ont ot come gat patient.  Daughter requested the hospital keep patient ov

## 2021-11-20 NOTE — CM/SW NOTE
MEGAN received a call from Dr. Carrie Landry that the staff is unable to reach a family member to  the patient.     CM left voice messages on both the daughter and son's voicemail and called numerous times stating the patient was discharged and waiting for a r

## 2021-11-20 NOTE — ED PROVIDER NOTES
Patient Seen in: [facility] Emergency Department      History   Patient presents with:  Abdomen/Flank Pain    Stated Complaint: abdominal pain    Subjective:   HPI    44-year-old female presents emergency room for evaluation of abdominal pain and di Patient is awake, alert, well-appearing, in no acute distress. HEENT:  no scleral icterus. Mucous membranes are slightly dry , oropharynx is clear, uvula midline. NECK: Neck is supple, there is no nuchal rigidity.   Back: No midline thoracic or lumbar s urinalysis unremarkable. Patient reported abdominal pain earlier, CT the abdomen plus pelvis performed differential included bowel obstruction, mass, diverticulitis, cholecystitis, appendicitis, and other.   CT the abdomen/pelvis did not reveal any acute f

## 2021-11-22 ENCOUNTER — TELEPHONE (OUTPATIENT)
Dept: FAMILY MEDICINE CLINIC | Facility: CLINIC | Age: 86
End: 2021-11-22

## 2021-11-22 NOTE — TELEPHONE ENCOUNTER
Pt's daughter called requesting an appointment for an ED follow up. Went to ED Friday, 11-1-21. She said the AVS stated Pt to f/u with provider 2 days later.

## 2021-11-22 NOTE — TELEPHONE ENCOUNTER
went to ED friday night  11/19/21 for not being able to urinate. Catheter was placed and patient still has catheter. Told to call PCP for ED follow up.  Also unable to get in to see Urologist for catheter removal.   Is thate another Urologist she could s

## 2021-11-22 NOTE — TELEPHONE ENCOUNTER
Pt's other daughter called requesting an appointment for the same thing. Informed her there is a message in already.

## 2021-11-22 NOTE — TELEPHONE ENCOUNTER
Future Appointments   Date Time Provider Lyndsey Alonzo   11/24/2021  1:40 PM Donna Haq MD [facility]

## 2021-11-22 NOTE — TELEPHONE ENCOUNTER
Future Appointments   Date Time Provider Lyndsey Cheri   11/24/2021  1:40 PM Peyman Painter MD [facility]

## 2021-11-22 NOTE — TELEPHONE ENCOUNTER
Called and spoke to patient's daughter, Monster Jackson, and clarified she is the patient's daughter. Also has a daughter Holly Rosenthal. Explained Holly Sigifredohumble is not listed on patient's HIPAA release form.   States she spoke to Dr. Gordillo [place] office and they said they were schedu

## 2021-11-23 ENCOUNTER — TELEPHONE (OUTPATIENT)
Dept: FAMILY MEDICINE CLINIC | Facility: CLINIC | Age: 86
End: 2021-11-23

## 2021-11-23 DIAGNOSIS — Z97.8 INDWELLING FOLEY CATHETER PRESENT: ICD-10-CM

## 2021-11-23 DIAGNOSIS — R33.9 URINARY RETENTION: Primary | ICD-10-CM

## 2021-11-23 NOTE — TELEPHONE ENCOUNTER
Spoke to patient's daughter yesterday. Already has Urology appt scheduled. Urology Referral has been placed.     Future Appointments   Date Time Provider Lyndsey Alonzo   11/24/2021  1:40 PM Richie Arellano MD [facility]

## 2021-11-23 NOTE — TELEPHONE ENCOUNTER
Epic nurse stated patient was hospitalized due to not able to void. She was sent home with a amato. Patient was instructed to f/u with urologist Dr. Jevon Rain. Please place referral today.   Please inform family of referral so they can schedule emmett

## 2021-11-30 ENCOUNTER — TELEPHONE (OUTPATIENT)
Dept: FAMILY MEDICINE CLINIC | Facility: CLINIC | Age: 86
End: 2021-11-30

## 2021-11-30 NOTE — TELEPHONE ENCOUNTER
H.H nurse called to give Dr condition update, nurse said per pt's daughter pt fell 3 days ago, pt has large painful bruise on right shoulder, nurse is going to inform family pt needs to be taken to immediate care,

## 2021-11-30 NOTE — TELEPHONE ENCOUNTER
Dr. Willy Davidson,  [facility], please advise    Patient's family won't take patient out of the house.   They are requesting in home right shoulder XR

## 2021-11-30 NOTE — TELEPHONE ENCOUNTER
Arcenio from [facility] called - [phone] stating Pt's family does not want to take Pt to Urgent/Immediate Care. Will not take her out of the house. They want [facility] to bring in a portable Xray machine.     Orders needed for Epic

## 2021-12-01 ENCOUNTER — APPOINTMENT (OUTPATIENT)
Dept: GENERAL RADIOLOGY | Facility: HOSPITAL | Age: 86
End: 2021-12-01
Payer: MEDICARE

## 2021-12-01 ENCOUNTER — HOSPITAL ENCOUNTER (EMERGENCY)
Facility: HOSPITAL | Age: 86
Discharge: HOME | End: 2021-12-01
Attending: EMERGENCY MEDICINE
Payer: MEDICARE

## 2021-12-01 ENCOUNTER — APPOINTMENT (OUTPATIENT)
Dept: GENERAL RADIOLOGY | Facility: HOSPITAL | Age: 86
End: 2021-12-01
Attending: EMERGENCY MEDICINE
Payer: MEDICARE

## 2021-12-01 VITALS
HEART RATE: 103 BPM | WEIGHT: 155 LBS | RESPIRATION RATE: 16 BRPM | OXYGEN SATURATION: 98 % | DIASTOLIC BLOOD PRESSURE: 71 MMHG | HEIGHT: 60 IN | SYSTOLIC BLOOD PRESSURE: 126 MMHG | BODY MASS INDEX: 30.43 KG/M2 | TEMPERATURE: 98 F

## 2021-12-01 DIAGNOSIS — S42.201A CLOSED FRACTURE OF PROXIMAL END OF RIGHT HUMERUS, UNSPECIFIED FRACTURE MORPHOLOGY, INITIAL ENCOUNTER: Primary | ICD-10-CM

## 2021-12-01 PROCEDURE — 99283 EMERGENCY DEPT VISIT LOW MDM: CPT

## 2021-12-01 PROCEDURE — 73030 X-RAY EXAM OF SHOULDER: CPT | Performed by: EMERGENCY MEDICINE

## 2021-12-01 PROCEDURE — 73080 X-RAY EXAM OF ELBOW: CPT

## 2021-12-01 NOTE — ED PROVIDER NOTES
Patient Seen in: [facility] Emergency Department      History   Patient presents with:  Fracture    Stated Complaint: known fractue to right humerus     Subjective:   HPI    28-year-old female presents today for right upper extremity injury.   3 days Normal rate. Pulmonary:      Effort: Pulmonary effort is normal.   Abdominal:      General: Abdomen is flat. Musculoskeletal:         General: Normal range of motion. Comments: Palpable right dorsalis pedis pulse. Compartments are soft.   Full fle views were obtained. COMPARISON:  None. INDICATIONS:  known fractue to right humerus  PATIENT STATED HISTORY: (As transcribed by Technologist)  Patient offered no additional history at this time.     FINDINGS:  There is a irregularity of the proximal righ

## 2021-12-01 NOTE — TELEPHONE ENCOUNTER
Xray of shoulder done per home health. Report received. With acute fracture of the R surgical neck of the humerus. Also with dislocation of the radius and ulna. Results discussed with daughter. Advised patient needs to go to ER.   Family arranging for

## 2021-12-02 NOTE — CM/SW NOTE
[facility] has accepted the patient for tonight. America Larsen (son) would rather take her home and be cared by the family for now since his sister is coming into town tomorrow and they have a wedding this weekend.  I provided Josie Craft at [facility] ([phone]) number

## 2021-12-02 NOTE — CM/SW NOTE
Spoke to son (Kitty) at the bedside and patient is concerned about taking the patient home. He states that she can be unsteady on her feet. Pt has a humeral fracture at this time. He states that the patient has a caregiver from 8am to 5pm daily.  Family try

## 2021-12-23 ENCOUNTER — TELEPHONE (OUTPATIENT)
Dept: ORTHOPEDICS CLINIC | Facility: CLINIC | Age: 86
End: 2021-12-23

## 2021-12-23 DIAGNOSIS — M25.511 RIGHT SHOULDER PAIN, UNSPECIFIED CHRONICITY: Primary | ICD-10-CM

## 2021-12-23 NOTE — TELEPHONE ENCOUNTER
ED note: 12/1/21  Patient's right shoulder demonstrates a proximal humerus fracture.   The right elbow was read as normal.  I reviewed the outside read with the radiologist.  AP x-ray repeated and normal.  Patient, daughter and grandson who is a physician u

## 2021-12-27 ENCOUNTER — OFFICE VISIT (OUTPATIENT)
Dept: ORTHOPEDICS CLINIC | Facility: CLINIC | Age: 86
End: 2021-12-27
Payer: MEDICARE

## 2021-12-27 ENCOUNTER — HOSPITAL ENCOUNTER (OUTPATIENT)
Dept: GENERAL RADIOLOGY | Age: 86
Discharge: HOME | End: 2021-12-27
Attending: PHYSICIAN ASSISTANT
Payer: MEDICARE

## 2021-12-27 ENCOUNTER — TELEPHONE (OUTPATIENT)
Dept: ORTHOPEDICS CLINIC | Facility: CLINIC | Age: 86
End: 2021-12-27

## 2021-12-27 DIAGNOSIS — S42.291A OTHER CLOSED DISPLACED FRACTURE OF PROXIMAL END OF RIGHT HUMERUS, INITIAL ENCOUNTER: Primary | ICD-10-CM

## 2021-12-27 DIAGNOSIS — M25.511 RIGHT SHOULDER PAIN, UNSPECIFIED CHRONICITY: ICD-10-CM

## 2021-12-27 PROBLEM — G30.9 ALZHEIMER'S DISEASE, UNSPECIFIED (HCC): Status: ACTIVE | Noted: 2021-12-27

## 2021-12-27 PROBLEM — F02.80 ALZHEIMER'S DISEASE, UNSPECIFIED (HCC): Status: ACTIVE | Noted: 2021-12-27

## 2021-12-27 PROCEDURE — 99204 OFFICE O/P NEW MOD 45 MIN: CPT | Performed by: ORTHOPAEDIC SURGERY

## 2021-12-27 PROCEDURE — 73030 X-RAY EXAM OF SHOULDER: CPT | Performed by: PHYSICIAN ASSISTANT

## 2021-12-27 PROCEDURE — 23600 CLTX PROX HUMRL FX W/O MNPJ: CPT | Performed by: ORTHOPAEDIC SURGERY

## 2021-12-27 NOTE — TELEPHONE ENCOUNTER
Ward Almendarez MD  You Just now (3:07 PM)     5 pound weightbearing restrictions.     No significant range of motion restrictions, encourage range of motion as tolerated hand wrist elbow and shoulder.  Okay to come out of the sling.     Recommend nai

## 2021-12-27 NOTE — TELEPHONE ENCOUNTER
Adrian Tinsley from [employer] called regarding PT protocols for patient's right arm. 1)  What are the weightbearing restrictions? 2)  Are there any ROM restrictions? 3)  When can patient come out of the sling?     Please fax order with above questions add

## 2021-12-27 NOTE — H&P
[facility]  [place] [address]  [phone]     NEW PATIENT VISIT - HISTORY AND PHYSICAL EXAMINATION     Name: Taft Osgood   MRN: [id number]  Date: 12/27/2021     CC: Right shoulder pain, Metoprolol Succinate ER 50 MG Oral Tablet 24 Hr Take 50 mg by mouth once daily. 3   • Meloxicam 15 MG Oral Tab Take 15 mg by mouth daily as needed. • famoTIDine (PEPCID) 20 MG Oral Tab 20 mg daily as needed.     5       ROS: A comprehensive 14 point ulnar, radial and axillary nerve  Circulation:   Normal, 2+ radial pulse    The contralateral upper extremity is without limitation in range of motion or strength, no positive provocative maneuvers.      Radiographic Examination/Diagnostics:    X-rays of th There is a irregularity of the proximal right humerus consistent with history of impacted fracture. Osteoarthritic changes. The visualized portion of the right lung is clear. CONCLUSION:  Impacted proximal right humeral fracture.   Siddiqui Viki

## 2022-01-10 ENCOUNTER — MED REC SCAN ONLY (OUTPATIENT)
Dept: FAMILY MEDICINE CLINIC | Facility: CLINIC | Age: 87
End: 2022-01-10

## 2022-01-14 ENCOUNTER — HOSPITAL ENCOUNTER (OUTPATIENT)
Dept: CT IMAGING | Facility: HOSPITAL | Age: 87
Discharge: HOME | End: 2022-01-14
Attending: INTERNAL MEDICINE
Payer: MEDICARE

## 2022-01-14 ENCOUNTER — APPOINTMENT (OUTPATIENT)
Dept: CT IMAGING | Facility: HOSPITAL | Age: 87
End: 2022-01-14
Attending: EMERGENCY MEDICINE
Payer: MEDICARE

## 2022-01-14 ENCOUNTER — HOSPITAL ENCOUNTER (EMERGENCY)
Facility: HOSPITAL | Age: 87
Discharge: HOME | End: 2022-01-14
Attending: EMERGENCY MEDICINE
Payer: MEDICARE

## 2022-01-14 VITALS
RESPIRATION RATE: 18 BRPM | HEART RATE: 70 BPM | TEMPERATURE: 98 F | HEIGHT: 66 IN | BODY MASS INDEX: 24.91 KG/M2 | OXYGEN SATURATION: 95 % | WEIGHT: 155 LBS | DIASTOLIC BLOOD PRESSURE: 70 MMHG | SYSTOLIC BLOOD PRESSURE: 129 MMHG

## 2022-01-14 DIAGNOSIS — R53.1 RIGHT SIDED WEAKNESS: ICD-10-CM

## 2022-01-14 DIAGNOSIS — R41.82 ALTERED MENTAL STATUS, UNSPECIFIED ALTERED MENTAL STATUS TYPE: Primary | ICD-10-CM

## 2022-01-14 DIAGNOSIS — S42.301A CLOSED FRACTURE OF SHAFT OF RIGHT HUMERUS, UNSPECIFIED FRACTURE MORPHOLOGY, INITIAL ENCOUNTER: ICD-10-CM

## 2022-01-14 DIAGNOSIS — R29.810 DROOPING OF MOUTH: ICD-10-CM

## 2022-01-14 DIAGNOSIS — G56.30 RADIAL NERVE PALSY: ICD-10-CM

## 2022-01-14 LAB
ALBUMIN SERPL-MCNC: 2.7 G/DL (ref 3.4–5)
ALBUMIN/GLOB SERPL: 0.6 {RATIO} (ref 1–2)
ALP LIVER SERPL-CCNC: 112 U/L
ALT SERPL-CCNC: 13 U/L
ANION GAP SERPL CALC-SCNC: 7 MMOL/L (ref 0–18)
AST SERPL-CCNC: 29 U/L (ref 15–37)
BASOPHILS # BLD AUTO: 0.02 X10(3) UL (ref 0–0.2)
BASOPHILS NFR BLD AUTO: 0.3 %
BILIRUB SERPL-MCNC: 0.3 MG/DL (ref 0.1–2)
BUN BLD-MCNC: 8 MG/DL (ref 7–18)
CALCIUM BLD-MCNC: 8.7 MG/DL (ref 8.5–10.1)
CHLORIDE SERPL-SCNC: 98 MMOL/L (ref 98–112)
CO2 SERPL-SCNC: 28 MMOL/L (ref 21–32)
CREAT BLD-MCNC: 0.76 MG/DL
EOSINOPHIL # BLD AUTO: 0.04 X10(3) UL (ref 0–0.7)
EOSINOPHIL NFR BLD AUTO: 0.5 %
ERYTHROCYTE [DISTWIDTH] IN BLOOD BY AUTOMATED COUNT: 12.8 %
GLOBULIN PLAS-MCNC: 4.5 G/DL (ref 2.8–4.4)
GLUCOSE BLD-MCNC: 128 MG/DL (ref 70–99)
HCT VFR BLD AUTO: 43.2 %
HGB BLD-MCNC: 14.4 G/DL
IMM GRANULOCYTES # BLD AUTO: 0.03 X10(3) UL (ref 0–1)
IMM GRANULOCYTES NFR BLD: 0.4 %
LYMPHOCYTES # BLD AUTO: 2.14 X10(3) UL (ref 1–4)
LYMPHOCYTES NFR BLD AUTO: 27.2 %
MCH RBC QN AUTO: 31.4 PG (ref 26–34)
MCHC RBC AUTO-ENTMCNC: 33.3 G/DL (ref 31–37)
MCV RBC AUTO: 94.3 FL
MONOCYTES # BLD AUTO: 0.59 X10(3) UL (ref 0.1–1)
MONOCYTES NFR BLD AUTO: 7.5 %
NEUTROPHILS # BLD AUTO: 5.05 X10 (3) UL (ref 1.5–7.7)
NEUTROPHILS # BLD AUTO: 5.05 X10(3) UL (ref 1.5–7.7)
NEUTROPHILS NFR BLD AUTO: 64.1 %
OSMOLALITY SERPL CALC.SUM OF ELEC: 276 MOSM/KG (ref 275–295)
PLATELET # BLD AUTO: 286 10(3)UL (ref 150–450)
POTASSIUM SERPL-SCNC: 4 MMOL/L (ref 3.5–5.1)
PROT SERPL-MCNC: 7.2 G/DL (ref 6.4–8.2)
RBC # BLD AUTO: 4.58 X10(6)UL
SODIUM SERPL-SCNC: 133 MMOL/L (ref 136–145)
WBC # BLD AUTO: 7.9 X10(3) UL (ref 4–11)

## 2022-01-14 PROCEDURE — 80053 COMPREHEN METABOLIC PANEL: CPT | Performed by: EMERGENCY MEDICINE

## 2022-01-14 PROCEDURE — 70450 CT HEAD/BRAIN W/O DYE: CPT | Performed by: EMERGENCY MEDICINE

## 2022-01-14 PROCEDURE — 93005 ELECTROCARDIOGRAM TRACING: CPT

## 2022-01-14 PROCEDURE — 93010 ELECTROCARDIOGRAM REPORT: CPT

## 2022-01-14 PROCEDURE — 99284 EMERGENCY DEPT VISIT MOD MDM: CPT

## 2022-01-14 PROCEDURE — 36415 COLL VENOUS BLD VENIPUNCTURE: CPT

## 2022-01-14 PROCEDURE — 85025 COMPLETE CBC W/AUTO DIFF WBC: CPT | Performed by: EMERGENCY MEDICINE

## 2022-01-14 NOTE — ED QUICK NOTES
Per RN at [facility], Dr. Francisca Edwards is pts primary. Per RN at [facility], pt was supposed to be sent over for a schedule CT scan that was not STAT due to symptoms of weakness and drooling 1 week ago.  Per RN at facility pt has a hx of Dementia baseline A/Ox1-2 primary

## 2022-01-14 NOTE — ED PROVIDER NOTES
Patient Seen in: [facility] Emergency Department      History   Patient presents with: Other    Stated Complaint: Possible Stroke? Subjective:   HPI    Patient is an 21-year-old female comes in emergency room from the outpatient CT by EMS.   Wyatt Prabhakar arm.  She moves her legs minimally on each side.   Skin: Warm and dry  ED Course     Labs Reviewed   COMP METABOLIC PANEL (14) - Abnormal; Notable for the following components:       Result Value    Glucose 128 (*)     Sodium 133 (*)     Albumin 2.7 (*) moderate degenerative changes of the glenohumeral joint. CONCLUSION:  Subacute fracture involving the surgical neck of the right humerus with continued anterior displacement of the shaft compared to the humeral head.   There is no bony bridging a CONCLUSION:  1. Mild diffuse atrophy and white matter disease consistent with chronic small vessel ischemic changes, which are similar to prior imaging. 2. No discrete evidence of an acute process. 3. Stable chronic sinus disease.    Dictated by (CST):

## 2022-01-14 NOTE — ED INITIAL ASSESSMENT (HPI)
Patient bib ems from SNF for c/o R humoral Fx    Per EMS, patient was taken to CT for humoral Fx imaging, CT told EMS to bring patient to the ER for stroke work up    Per EMS mckenna negative, +RUE weakness d/t Fx    Patient A&Ox2 at baseline hx of hammad

## 2022-01-17 LAB
ATRIAL RATE: 74 BPM
P AXIS: 60 DEGREES
P-R INTERVAL: 138 MS
Q-T INTERVAL: 400 MS
QRS DURATION: 68 MS
QTC CALCULATION (BEZET): 444 MS
R AXIS: 22 DEGREES
T AXIS: 61 DEGREES
VENTRICULAR RATE: 74 BPM

## 2022-02-28 ENCOUNTER — HOSPITAL ENCOUNTER (OUTPATIENT)
Dept: GENERAL RADIOLOGY | Age: 87
Discharge: HOME | End: 2022-02-28
Attending: PHYSICIAN ASSISTANT
Payer: MEDICAID

## 2022-02-28 ENCOUNTER — OFFICE VISIT (OUTPATIENT)
Dept: ORTHOPEDICS CLINIC | Facility: CLINIC | Age: 87
End: 2022-02-28
Payer: MEDICARE

## 2022-02-28 VITALS — OXYGEN SATURATION: 95 % | HEART RATE: 73 BPM

## 2022-02-28 DIAGNOSIS — S42.291A OTHER CLOSED DISPLACED FRACTURE OF PROXIMAL END OF RIGHT HUMERUS, INITIAL ENCOUNTER: ICD-10-CM

## 2022-02-28 DIAGNOSIS — S42.291D OTHER CLOSED DISPLACED FRACTURE OF PROXIMAL END OF RIGHT HUMERUS WITH ROUTINE HEALING, SUBSEQUENT ENCOUNTER: Primary | ICD-10-CM

## 2022-02-28 PROCEDURE — 99213 OFFICE O/P EST LOW 20 MIN: CPT | Performed by: PHYSICIAN ASSISTANT

## 2022-02-28 PROCEDURE — 73030 X-RAY EXAM OF SHOULDER: CPT | Performed by: PHYSICIAN ASSISTANT

## 2022-02-28 RX ORDER — ENOXAPARIN SODIUM 100 MG/ML
INJECTION SUBCUTANEOUS
COMMUNITY
Start: 2022-02-20

## 2022-03-25 ENCOUNTER — TELEPHONE (OUTPATIENT)
Dept: FAMILY MEDICINE CLINIC | Facility: CLINIC | Age: 87
End: 2022-03-25

## 2022-04-10 ENCOUNTER — APPOINTMENT (OUTPATIENT)
Dept: CT IMAGING | Facility: HOSPITAL | Age: 87
End: 2022-04-10
Attending: EMERGENCY MEDICINE
Payer: MEDICARE

## 2022-04-10 ENCOUNTER — HOSPITAL ENCOUNTER (EMERGENCY)
Facility: HOSPITAL | Age: 87
Discharge: HOME | End: 2022-04-10
Attending: EMERGENCY MEDICINE
Payer: MEDICARE

## 2022-04-10 ENCOUNTER — APPOINTMENT (OUTPATIENT)
Dept: GENERAL RADIOLOGY | Facility: HOSPITAL | Age: 87
End: 2022-04-10
Attending: EMERGENCY MEDICINE
Payer: MEDICARE

## 2022-04-10 VITALS
BODY MASS INDEX: 25 KG/M2 | TEMPERATURE: 98 F | HEART RATE: 79 BPM | WEIGHT: 155 LBS | OXYGEN SATURATION: 94 % | RESPIRATION RATE: 18 BRPM | DIASTOLIC BLOOD PRESSURE: 80 MMHG | SYSTOLIC BLOOD PRESSURE: 154 MMHG

## 2022-04-10 DIAGNOSIS — S42.211S CLOSED DISPLACED FRACTURE OF SURGICAL NECK OF RIGHT HUMERUS, UNSPECIFIED FRACTURE MORPHOLOGY, SEQUELA: ICD-10-CM
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
## 2022-04-10 DIAGNOSIS — S01.01XA SCALP LACERATION, INITIAL ENCOUNTER: Primary | ICD-10-CM

## 2022-04-10 LAB
ALBUMIN SERPL-MCNC: 3.2 G/DL (ref 3.4–5)
ALBUMIN/GLOB SERPL: 0.7 {RATIO} (ref 1–2)
ALP LIVER SERPL-CCNC: 92 U/L
ALT SERPL-CCNC: 9 U/L
ANION GAP SERPL CALC-SCNC: 3 MMOL/L (ref 0–18)
APTT PPP: 35.7 SECONDS (ref 23.3–35.6)
AST SERPL-CCNC: 15 U/L (ref 15–37)
BASOPHILS # BLD AUTO: 0.04 X10(3) UL (ref 0–0.2)
BASOPHILS NFR BLD AUTO: 0.4 %
BILIRUB SERPL-MCNC: 0.3 MG/DL (ref 0.1–2)
BUN BLD-MCNC: 13 MG/DL (ref 7–18)
CALCIUM BLD-MCNC: 8.9 MG/DL (ref 8.5–10.1)
CHLORIDE SERPL-SCNC: 101 MMOL/L (ref 98–112)
CO2 SERPL-SCNC: 28 MMOL/L (ref 21–32)
CREAT BLD-MCNC: 0.81 MG/DL
EOSINOPHIL # BLD AUTO: 0.04 X10(3) UL (ref 0–0.7)
EOSINOPHIL NFR BLD AUTO: 0.4 %
ERYTHROCYTE [DISTWIDTH] IN BLOOD BY AUTOMATED COUNT: 14.6 %
GLOBULIN PLAS-MCNC: 4.4 G/DL (ref 2.8–4.4)
GLUCOSE BLD-MCNC: 165 MG/DL (ref 70–99)
HCT VFR BLD AUTO: 44.6 %
HGB BLD-MCNC: 14.2 G/DL
IMM GRANULOCYTES # BLD AUTO: 0.03 X10(3) UL (ref 0–1)
IMM GRANULOCYTES NFR BLD: 0.3 %
INR BLD: 1.11 (ref 0.8–1.2)
LYMPHOCYTES # BLD AUTO: 2.09 X10(3) UL (ref 1–4)
LYMPHOCYTES NFR BLD AUTO: 20.5 %
MCH RBC QN AUTO: 30.3 PG (ref 26–34)
MCHC RBC AUTO-ENTMCNC: 31.8 G/DL (ref 31–37)
MCV RBC AUTO: 95.3 FL
MONOCYTES # BLD AUTO: 0.54 X10(3) UL (ref 0.1–1)
MONOCYTES NFR BLD AUTO: 5.3 %
NEUTROPHILS # BLD AUTO: 7.44 X10 (3) UL (ref 1.5–7.7)
NEUTROPHILS # BLD AUTO: 7.44 X10(3) UL (ref 1.5–7.7)
NEUTROPHILS NFR BLD AUTO: 73.1 %
OSMOLALITY SERPL CALC.SUM OF ELEC: 278 MOSM/KG (ref 275–295)
PLATELET # BLD AUTO: 281 10(3)UL (ref 150–450)
POTASSIUM SERPL-SCNC: 4.3 MMOL/L (ref 3.5–5.1)
PROT SERPL-MCNC: 7.6 G/DL (ref 6.4–8.2)
PROTHROMBIN TIME: 14.3 SECONDS (ref 11.6–14.8)
RBC # BLD AUTO: 4.68 X10(6)UL
SARS-COV-2 RNA RESP QL NAA+PROBE: NOT DETECTED
SODIUM SERPL-SCNC: 132 MMOL/L (ref 136–145)
TROPONIN I HIGH SENSITIVITY: 4 NG/L
WBC # BLD AUTO: 10.2 X10(3) UL (ref 4–11)

## 2022-04-10 PROCEDURE — 71045 X-RAY EXAM CHEST 1 VIEW: CPT | Performed by: EMERGENCY MEDICINE

## 2022-04-10 PROCEDURE — 99284 EMERGENCY DEPT VISIT MOD MDM: CPT

## 2022-04-10 PROCEDURE — 12004 RPR S/N/AX/GEN/TRK7.6-12.5CM: CPT

## 2022-04-10 PROCEDURE — 85025 COMPLETE CBC W/AUTO DIFF WBC: CPT | Performed by: EMERGENCY MEDICINE

## 2022-04-10 PROCEDURE — 85610 PROTHROMBIN TIME: CPT | Performed by: EMERGENCY MEDICINE

## 2022-04-10 PROCEDURE — 80053 COMPREHEN METABOLIC PANEL: CPT | Performed by: EMERGENCY MEDICINE

## 2022-04-10 PROCEDURE — 72125 CT NECK SPINE W/O DYE: CPT | Performed by: EMERGENCY MEDICINE

## 2022-04-10 PROCEDURE — 70450 CT HEAD/BRAIN W/O DYE: CPT | Performed by: EMERGENCY MEDICINE

## 2022-04-10 PROCEDURE — 73060 X-RAY EXAM OF HUMERUS: CPT | Performed by: EMERGENCY MEDICINE

## 2022-04-10 PROCEDURE — 84484 ASSAY OF TROPONIN QUANT: CPT | Performed by: EMERGENCY MEDICINE

## 2022-04-10 PROCEDURE — 90471 IMMUNIZATION ADMIN: CPT

## 2022-04-10 PROCEDURE — 85730 THROMBOPLASTIN TIME PARTIAL: CPT | Performed by: EMERGENCY MEDICINE

## 2022-04-10 PROCEDURE — 99285 EMERGENCY DEPT VISIT HI MDM: CPT

## 2022-04-10 RX ORDER — TETANUS AND DIPHTHERIA TOXOIDS ADSORBED 2; 2 [LF]/.5ML; [LF]/.5ML
0.5 INJECTION INTRAMUSCULAR ONCE
Status: COMPLETED | OUTPATIENT
Start: 2022-04-10 | End: 2022-04-10

## 2022-04-10 RX ORDER — HYDROCODONE BITARTRATE AND ACETAMINOPHEN 5; 325 MG/1; MG/1
1 TABLET ORAL ONCE
Status: COMPLETED | OUTPATIENT
Start: 2022-04-10 | End: 2022-04-10
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
## 2022-05-31 ENCOUNTER — TELEPHONE (OUTPATIENT)
Dept: ORTHOPEDICS CLINIC | Facility: CLINIC | Age: 87
End: 2022-05-31

## 2022-05-31 DIAGNOSIS — S42.291D OTHER CLOSED DISPLACED FRACTURE OF PROXIMAL END OF RIGHT HUMERUS WITH ROUTINE HEALING, SUBSEQUENT ENCOUNTER: Primary | ICD-10-CM

## 2022-09-13 ENCOUNTER — HOSPITAL ENCOUNTER (INPATIENT)
Facility: HOSPITAL | Age: 87
LOS: 5 days | Discharge: HOME HEALTH | DRG: 417 | End: 2022-09-19
Attending: EMERGENCY MEDICINE | Admitting: HOSPITALIST
Payer: MEDICARE

## 2022-09-13 ENCOUNTER — HOSPITAL ENCOUNTER (INPATIENT)
Facility: HOSPITAL | Age: 87
LOS: 5 days | Discharge: NURSING, REHAB OR LONG-TERM CARE | DRG: 417 | End: 2022-09-19
Attending: EMERGENCY MEDICINE | Admitting: HOSPITALIST
Payer: MEDICARE

## 2022-09-13 ENCOUNTER — APPOINTMENT (OUTPATIENT)
Dept: GENERAL RADIOLOGY | Facility: HOSPITAL | Age: 87
DRG: 417 | End: 2022-09-13
Attending: EMERGENCY MEDICINE
Payer: MEDICARE

## 2022-09-13 DIAGNOSIS — R53.83 LETHARGY: Primary | ICD-10-CM

## 2022-09-13 DIAGNOSIS — K80.20 CHOLELITHIASES: ICD-10-CM

## 2022-09-13 DIAGNOSIS — N39.0 URINARY TRACT INFECTION WITHOUT HEMATURIA, SITE UNSPECIFIED: ICD-10-CM

## 2022-09-13 DIAGNOSIS — D72.829 LEUKOCYTOSIS, UNSPECIFIED TYPE: ICD-10-CM

## 2022-09-13 DIAGNOSIS — K81.9 CHOLECYSTITIS: ICD-10-CM

## 2022-09-13 PROBLEM — R73.9 HYPERGLYCEMIA: Status: ACTIVE | Noted: 2022-09-13

## 2022-09-13 PROBLEM — E87.1 HYPONATREMIA: Status: ACTIVE | Noted: 2022-09-13

## 2022-09-13 PROBLEM — R79.89 AZOTEMIA: Status: ACTIVE | Noted: 2022-09-13

## 2022-09-13 LAB
ALBUMIN SERPL-MCNC: 2.9 G/DL (ref 3.4–5)
ALBUMIN/GLOB SERPL: 0.6 {RATIO} (ref 1–2)
ALP LIVER SERPL-CCNC: 114 U/L
ALT SERPL-CCNC: <6 U/L
ANION GAP SERPL CALC-SCNC: 4 MMOL/L (ref 0–18)
AST SERPL-CCNC: 20 U/L (ref 15–37)
BASOPHILS # BLD AUTO: 0.03 X10(3) UL (ref 0–0.2)
BASOPHILS NFR BLD AUTO: 0.2 %
BILIRUB SERPL-MCNC: 0.7 MG/DL (ref 0.1–2)
BILIRUB UR QL STRIP.AUTO: NEGATIVE
BUN BLD-MCNC: 14 MG/DL (ref 7–18)
CALCIUM BLD-MCNC: 8.9 MG/DL (ref 8.5–10.1)
CHLORIDE SERPL-SCNC: 96 MMOL/L (ref 98–112)
CLARITY UR REFRACT.AUTO: CLEAR
CO2 SERPL-SCNC: 28 MMOL/L (ref 21–32)
COLOR UR AUTO: YELLOW
CREAT BLD-MCNC: 0.68 MG/DL
EOSINOPHIL # BLD AUTO: 0.04 X10(3) UL (ref 0–0.7)
EOSINOPHIL NFR BLD AUTO: 0.2 %
ERYTHROCYTE [DISTWIDTH] IN BLOOD BY AUTOMATED COUNT: 14.2 %
GFR SERPLBLD BASED ON 1.73 SQ M-ARVRAT: 85 ML/MIN/1.73M2 (ref 60–?)
GLOBULIN PLAS-MCNC: 5 G/DL (ref 2.8–4.4)
GLUCOSE BLD-MCNC: 145 MG/DL (ref 70–99)
GLUCOSE UR STRIP.AUTO-MCNC: NEGATIVE MG/DL
HCT VFR BLD AUTO: 40.6 %
HGB BLD-MCNC: 13.5 G/DL
IMM GRANULOCYTES # BLD AUTO: 0.1 X10(3) UL (ref 0–1)
IMM GRANULOCYTES NFR BLD: 0.6 %
KETONES UR STRIP.AUTO-MCNC: NEGATIVE MG/DL
LEUKOCYTE ESTERASE UR QL STRIP.AUTO: NEGATIVE
LYMPHOCYTES # BLD AUTO: 1.99 X10(3) UL (ref 1–4)
LYMPHOCYTES NFR BLD AUTO: 12.4 %
MCH RBC QN AUTO: 30.7 PG (ref 26–34)
MCHC RBC AUTO-ENTMCNC: 33.3 G/DL (ref 31–37)
MCV RBC AUTO: 92.3 FL
MONOCYTES # BLD AUTO: 1.52 X10(3) UL (ref 0.1–1)
MONOCYTES NFR BLD AUTO: 9.5 %
NEUTROPHILS # BLD AUTO: 12.33 X10 (3) UL (ref 1.5–7.7)
NEUTROPHILS # BLD AUTO: 12.33 X10(3) UL (ref 1.5–7.7)
NEUTROPHILS NFR BLD AUTO: 77.1 %
NITRITE UR QL STRIP.AUTO: POSITIVE
OSMOLALITY SERPL CALC.SUM OF ELEC: 269 MOSM/KG (ref 275–295)
PH UR STRIP.AUTO: 6 [PH] (ref 5–8)
PLATELET # BLD AUTO: 319 10(3)UL (ref 150–450)
POTASSIUM SERPL-SCNC: 4.3 MMOL/L (ref 3.5–5.1)
PROT SERPL-MCNC: 7.9 G/DL (ref 6.4–8.2)
RBC # BLD AUTO: 4.4 X10(6)UL
RBC #/AREA URNS AUTO: >10 /HPF
RBC #/AREA URNS AUTO: >10 /HPF
SARS-COV-2 RNA RESP QL NAA+PROBE: NOT DETECTED
SODIUM SERPL-SCNC: 128 MMOL/L (ref 136–145)
SP GR UR STRIP.AUTO: >=1.03 (ref 1–1.03)
UROBILINOGEN UR STRIP.AUTO-MCNC: 2 MG/DL
WBC # BLD AUTO: 16 X10(3) UL (ref 4–11)

## 2022-09-13 PROCEDURE — 99223 1ST HOSP IP/OBS HIGH 75: CPT | Performed by: HOSPITALIST

## 2022-09-13 PROCEDURE — 71045 X-RAY EXAM CHEST 1 VIEW: CPT | Performed by: EMERGENCY MEDICINE

## 2022-09-13 RX ORDER — SODIUM CHLORIDE 9 MG/ML
1000 INJECTION, SOLUTION INTRAVENOUS CONTINUOUS
Status: DISCONTINUED | OUTPATIENT
Start: 2022-09-13 | End: 2022-09-14

## 2022-09-13 RX ORDER — DEXTROSE 5 % IN WATER
PIGGYBACK WITH THREADED PORT (ML) INTRAVENOUS
Status: DISCONTINUED
Start: 2022-09-13 | End: 2022-09-13 | Stop reason: WASHOUT

## 2022-09-13 RX ORDER — CEFTRIAXONE 2 G/1
INJECTION, POWDER, FOR SOLUTION INTRAMUSCULAR; INTRAVENOUS
Status: DISCONTINUED
Start: 2022-09-13 | End: 2022-09-13 | Stop reason: WASHOUT

## 2022-09-14 PROBLEM — N39.0 URINARY TRACT INFECTION WITHOUT HEMATURIA, SITE UNSPECIFIED: Status: ACTIVE | Noted: 2022-09-14

## 2022-09-14 PROBLEM — D72.829 LEUKOCYTOSIS, UNSPECIFIED TYPE: Status: ACTIVE | Noted: 2022-09-14

## 2022-09-14 LAB
ANION GAP SERPL CALC-SCNC: 9 MMOL/L (ref 0–18)
BASOPHILS # BLD AUTO: 0.03 X10(3) UL (ref 0–0.2)
BASOPHILS NFR BLD AUTO: 0.1 %
BUN BLD-MCNC: 13 MG/DL (ref 7–18)
CALCIUM BLD-MCNC: 9.3 MG/DL (ref 8.5–10.1)
CHLORIDE SERPL-SCNC: 94 MMOL/L (ref 98–112)
CO2 SERPL-SCNC: 23 MMOL/L (ref 21–32)
CREAT BLD-MCNC: 0.59 MG/DL
EOSINOPHIL # BLD AUTO: 0.08 X10(3) UL (ref 0–0.7)
EOSINOPHIL NFR BLD AUTO: 0.4 %
ERYTHROCYTE [DISTWIDTH] IN BLOOD BY AUTOMATED COUNT: 14 %
GFR SERPLBLD BASED ON 1.73 SQ M-ARVRAT: 88 ML/MIN/1.73M2 (ref 60–?)
GLUCOSE BLD-MCNC: 208 MG/DL (ref 70–99)
HCT VFR BLD AUTO: 41.9 %
HGB BLD-MCNC: 14 G/DL
IMM GRANULOCYTES # BLD AUTO: 0.15 X10(3) UL (ref 0–1)
IMM GRANULOCYTES NFR BLD: 0.7 %
LACTATE SERPL-SCNC: 1.4 MMOL/L (ref 0.4–2)
LYMPHOCYTES # BLD AUTO: 0.59 X10(3) UL (ref 1–4)
LYMPHOCYTES NFR BLD AUTO: 2.9 %
MAGNESIUM SERPL-MCNC: 1.8 MG/DL (ref 1.6–2.6)
MCH RBC QN AUTO: 30.3 PG (ref 26–34)
MCHC RBC AUTO-ENTMCNC: 33.4 G/DL (ref 31–37)
MCV RBC AUTO: 90.7 FL
MONOCYTES # BLD AUTO: 1.44 X10(3) UL (ref 0.1–1)
MONOCYTES NFR BLD AUTO: 7.1 %
NEUTROPHILS # BLD AUTO: 17.89 X10 (3) UL (ref 1.5–7.7)
NEUTROPHILS # BLD AUTO: 17.89 X10(3) UL (ref 1.5–7.7)
NEUTROPHILS NFR BLD AUTO: 88.8 %
OSMOLALITY SERPL CALC.SUM OF ELEC: 268 MOSM/KG (ref 275–295)
PLATELET # BLD AUTO: 327 10(3)UL (ref 150–450)
POTASSIUM SERPL-SCNC: 4 MMOL/L (ref 3.5–5.1)
RBC # BLD AUTO: 4.62 X10(6)UL
SODIUM SERPL-SCNC: 126 MMOL/L (ref 136–145)
WBC # BLD AUTO: 20.2 X10(3) UL (ref 4–11)

## 2022-09-14 PROCEDURE — 99232 SBSQ HOSP IP/OBS MODERATE 35: CPT | Performed by: HOSPITALIST

## 2022-09-14 RX ORDER — SULFAMETHOXAZOLE AND TRIMETHOPRIM 800; 160 MG/1; MG/1
1 TABLET ORAL 2 TIMES DAILY
COMMUNITY
Start: 2022-09-13 | End: 2022-09-19

## 2022-09-14 RX ORDER — ESCITALOPRAM OXALATE 10 MG/1
10 TABLET ORAL DAILY
Status: DISCONTINUED | OUTPATIENT
Start: 2022-09-14 | End: 2022-09-19

## 2022-09-14 RX ORDER — FOLIC ACID 1 MG/1
1 TABLET ORAL DAILY
Status: DISCONTINUED | OUTPATIENT
Start: 2022-09-14 | End: 2022-09-19

## 2022-09-14 RX ORDER — SODIUM CHLORIDE, SODIUM LACTATE, POTASSIUM CHLORIDE, CALCIUM CHLORIDE 600; 310; 30; 20 MG/100ML; MG/100ML; MG/100ML; MG/100ML
INJECTION, SOLUTION INTRAVENOUS CONTINUOUS
Status: DISCONTINUED | OUTPATIENT
Start: 2022-09-14 | End: 2022-09-14

## 2022-09-14 RX ORDER — SODIUM PHOSPHATE, DIBASIC AND SODIUM PHOSPHATE, MONOBASIC 7; 19 G/133ML; G/133ML
1 ENEMA RECTAL ONCE AS NEEDED
Status: DISCONTINUED | OUTPATIENT
Start: 2022-09-14 | End: 2022-09-19

## 2022-09-14 RX ORDER — POLYETHYLENE GLYCOL 3350 17 G/17G
17 POWDER, FOR SOLUTION ORAL DAILY PRN
Status: DISCONTINUED | OUTPATIENT
Start: 2022-09-14 | End: 2022-09-19

## 2022-09-14 RX ORDER — ONDANSETRON 2 MG/ML
4 INJECTION INTRAMUSCULAR; INTRAVENOUS EVERY 6 HOURS PRN
Status: DISCONTINUED | OUTPATIENT
Start: 2022-09-14 | End: 2022-09-19

## 2022-09-14 RX ORDER — SENNOSIDES 8.6 MG
17.2 TABLET ORAL NIGHTLY PRN
Status: DISCONTINUED | OUTPATIENT
Start: 2022-09-14 | End: 2022-09-19

## 2022-09-14 RX ORDER — ACETAMINOPHEN 325 MG/1
650 TABLET ORAL EVERY 6 HOURS PRN
COMMUNITY

## 2022-09-14 RX ORDER — ENOXAPARIN SODIUM 100 MG/ML
40 INJECTION SUBCUTANEOUS DAILY
Status: DISCONTINUED | OUTPATIENT
Start: 2022-09-14 | End: 2022-09-19

## 2022-09-14 RX ORDER — METOPROLOL SUCCINATE 50 MG/1
50 TABLET, EXTENDED RELEASE ORAL
Status: DISCONTINUED | OUTPATIENT
Start: 2022-09-14 | End: 2022-09-19

## 2022-09-14 RX ORDER — HYDRALAZINE HYDROCHLORIDE 20 MG/ML
10 INJECTION INTRAMUSCULAR; INTRAVENOUS EVERY 6 HOURS PRN
Status: DISCONTINUED | OUTPATIENT
Start: 2022-09-14 | End: 2022-09-19

## 2022-09-14 RX ORDER — DONEPEZIL HYDROCHLORIDE 5 MG/1
5 TABLET, FILM COATED ORAL NIGHTLY
Status: DISCONTINUED | OUTPATIENT
Start: 2022-09-14 | End: 2022-09-19

## 2022-09-14 RX ORDER — ACETAMINOPHEN 500 MG
1000 TABLET ORAL EVERY 4 HOURS PRN
Status: DISCONTINUED | OUTPATIENT
Start: 2022-09-14 | End: 2022-09-19

## 2022-09-14 RX ORDER — MELATONIN
3 NIGHTLY PRN
Status: DISCONTINUED | OUTPATIENT
Start: 2022-09-14 | End: 2022-09-19

## 2022-09-14 RX ORDER — SODIUM CHLORIDE 9 MG/ML
INJECTION, SOLUTION INTRAVENOUS CONTINUOUS
Status: DISCONTINUED | OUTPATIENT
Start: 2022-09-14 | End: 2022-09-16

## 2022-09-14 RX ORDER — BISACODYL 10 MG
10 SUPPOSITORY, RECTAL RECTAL
Status: DISCONTINUED | OUTPATIENT
Start: 2022-09-14 | End: 2022-09-19

## 2022-09-14 RX ORDER — SENNA PLUS 8.6 MG/1
2 TABLET ORAL 2 TIMES DAILY PRN
COMMUNITY

## 2022-09-14 RX ORDER — METOCLOPRAMIDE HYDROCHLORIDE 5 MG/ML
10 INJECTION INTRAMUSCULAR; INTRAVENOUS EVERY 8 HOURS PRN
Status: DISCONTINUED | OUTPATIENT
Start: 2022-09-14 | End: 2022-09-16

## 2022-09-14 RX ORDER — ECHINACEA PURPUREA EXTRACT 125 MG
1 TABLET ORAL
Status: DISCONTINUED | OUTPATIENT
Start: 2022-09-14 | End: 2022-09-19

## 2022-09-14 NOTE — PLAN OF CARE
Problem: GENITOURINARY  Goal: Maintain optimal urinary function  Description: Interventions:  - Assess ability to void  - Assess for bladder distention  - Monitor creatinine and BUN  - Monitor intake and output  - Insert urinary catheter as ordered  - Obtain appropriate imaging as ordered  Outcome: Not Progressing   Rocephin was changed to zosyn q 8 hrs for uti, Has poor appetite, Refused to eat and  Take meds,Family at bedside, Ivf at regulated rates,Repositioned and made comfortable. Will monitor.

## 2022-09-14 NOTE — ED INITIAL ASSESSMENT (HPI)
Patient to the ER from SNF for abnormal labs. Patient had labs drawn at facility early this morning with an elevated WBC. Patient's son would like her evaluated. Patient is alert and oriented x1 and poor historian. Report obtained from EMS.  Per facility, patient is at her baseline

## 2022-09-14 NOTE — PLAN OF CARE
Patient admitted from ED for abnormal labs, leukocytosis w/WBC 16.0. Pt is from [facility]. Per family, more lethargic than usual. Pt has a history of significant dementia, A/O x1 at baseline, unable to answer questions. Information obtained from NH transfer report. No skin issues noted. Pt treated for UTI and dehydration w/IVF and Rocephin. Changed and repositioned, will continue to monitor.      Problem: GENITOURINARY  Goal: Maintain optimal urinary function  Description: Interventions:  - Assess ability to void  - Assess for bladder distention  - Monitor creatinine and BUN  - Monitor intake and output  - Insert urinary catheter as ordered  - Obtain appropriate imaging as ordered  Outcome: Not Progressing

## 2022-09-14 NOTE — CM/SW NOTE
SW was informed in rounds [facility] SW called patient's son Ruel Hough who confirmed that patient is a long term resident there and that she will return there after DC. SW sent updated clinical information to [facility] for eventual patient admission. SW will continue to follow for medical clearance. SW will continue to follow for plan of care changes and remain available for any additional DC needs or concerns.      Edilia Bedolla MSW, LSW  Discharge Planner   [phone]

## 2022-09-15 ENCOUNTER — APPOINTMENT (OUTPATIENT)
Dept: GENERAL RADIOLOGY | Facility: HOSPITAL | Age: 87
DRG: 417 | End: 2022-09-15
Attending: SURGERY
Payer: MEDICARE

## 2022-09-15 ENCOUNTER — ANESTHESIA EVENT (OUTPATIENT)
Dept: SURGERY | Facility: HOSPITAL | Age: 87
DRG: 417 | End: 2022-09-15
Payer: MEDICARE

## 2022-09-15 ENCOUNTER — ANESTHESIA (OUTPATIENT)
Dept: SURGERY | Facility: HOSPITAL | Age: 87
DRG: 417 | End: 2022-09-15
Payer: MEDICARE

## 2022-09-15 ENCOUNTER — APPOINTMENT (OUTPATIENT)
Dept: CV DIAGNOSTICS | Facility: HOSPITAL | Age: 87
DRG: 417 | End: 2022-09-15
Attending: HOSPITALIST
Payer: MEDICARE

## 2022-09-15 ENCOUNTER — APPOINTMENT (OUTPATIENT)
Dept: ULTRASOUND IMAGING | Facility: HOSPITAL | Age: 87
DRG: 417 | End: 2022-09-15
Attending: HOSPITALIST
Payer: MEDICARE

## 2022-09-15 ENCOUNTER — APPOINTMENT (OUTPATIENT)
Dept: CT IMAGING | Facility: HOSPITAL | Age: 87
DRG: 417 | End: 2022-09-15
Attending: HOSPITALIST
Payer: MEDICARE

## 2022-09-15 LAB
ANION GAP SERPL CALC-SCNC: 7 MMOL/L (ref 0–18)
BASOPHILS # BLD AUTO: 0.03 X10(3) UL (ref 0–0.2)
BASOPHILS NFR BLD AUTO: 0.1 %
BUN BLD-MCNC: 18 MG/DL (ref 7–18)
CALCIUM BLD-MCNC: 8.8 MG/DL (ref 8.5–10.1)
CHLORIDE SERPL-SCNC: 102 MMOL/L (ref 98–112)
CO2 SERPL-SCNC: 23 MMOL/L (ref 21–32)
CREAT BLD-MCNC: 0.59 MG/DL
EOSINOPHIL # BLD AUTO: 0 X10(3) UL (ref 0–0.7)
EOSINOPHIL NFR BLD AUTO: 0 %
ERYTHROCYTE [DISTWIDTH] IN BLOOD BY AUTOMATED COUNT: 14.3 %
GFR SERPLBLD BASED ON 1.73 SQ M-ARVRAT: 88 ML/MIN/1.73M2 (ref 60–?)
GLUCOSE BLD-MCNC: 137 MG/DL (ref 70–99)
HCT VFR BLD AUTO: 38.6 %
HGB BLD-MCNC: 12.9 G/DL
IMM GRANULOCYTES # BLD AUTO: 0.27 X10(3) UL (ref 0–1)
IMM GRANULOCYTES NFR BLD: 1.1 %
LYMPHOCYTES # BLD AUTO: 0.98 X10(3) UL (ref 1–4)
LYMPHOCYTES NFR BLD AUTO: 4 %
MCH RBC QN AUTO: 30 PG (ref 26–34)
MCHC RBC AUTO-ENTMCNC: 33.4 G/DL (ref 31–37)
MCV RBC AUTO: 89.8 FL
MONOCYTES # BLD AUTO: 2.13 X10(3) UL (ref 0.1–1)
MONOCYTES NFR BLD AUTO: 8.7 %
NEUTROPHILS # BLD AUTO: 21.02 X10 (3) UL (ref 1.5–7.7)
NEUTROPHILS # BLD AUTO: 21.02 X10(3) UL (ref 1.5–7.7)
NEUTROPHILS NFR BLD AUTO: 86.1 %
OSMOLALITY SERPL CALC.SUM OF ELEC: 278 MOSM/KG (ref 275–295)
PLATELET # BLD AUTO: 279 10(3)UL (ref 150–450)
POTASSIUM SERPL-SCNC: 3.5 MMOL/L (ref 3.5–5.1)
RBC # BLD AUTO: 4.3 X10(6)UL
SODIUM SERPL-SCNC: 132 MMOL/L (ref 136–145)
WBC # BLD AUTO: 24.4 X10(3) UL (ref 4–11)

## 2022-09-15 PROCEDURE — 76700 US EXAM ABDOM COMPLETE: CPT | Performed by: HOSPITALIST

## 2022-09-15 PROCEDURE — 99223 1ST HOSP IP/OBS HIGH 75: CPT | Performed by: SURGERY

## 2022-09-15 PROCEDURE — 93306 TTE W/DOPPLER COMPLETE: CPT | Performed by: HOSPITALIST

## 2022-09-15 PROCEDURE — 74177 CT ABD & PELVIS W/CONTRAST: CPT | Performed by: HOSPITALIST

## 2022-09-15 PROCEDURE — 99232 SBSQ HOSP IP/OBS MODERATE 35: CPT | Performed by: HOSPITALIST

## 2022-09-15 PROCEDURE — 74300 X-RAY BILE DUCTS/PANCREAS: CPT | Performed by: SURGERY

## 2022-09-15 RX ORDER — HYDROMORPHONE HYDROCHLORIDE 1 MG/ML
0.6 INJECTION, SOLUTION INTRAMUSCULAR; INTRAVENOUS; SUBCUTANEOUS EVERY 5 MIN PRN
Status: DISCONTINUED | OUTPATIENT
Start: 2022-09-15 | End: 2022-09-16

## 2022-09-15 RX ORDER — HYDROCODONE BITARTRATE AND ACETAMINOPHEN 5; 325 MG/1; MG/1
1 TABLET ORAL ONCE AS NEEDED
Status: ACTIVE | OUTPATIENT
Start: 2022-09-15 | End: 2022-09-15

## 2022-09-15 RX ORDER — PHENYLEPHRINE HCL 10 MG/ML
VIAL (ML) INJECTION AS NEEDED
Status: DISCONTINUED | OUTPATIENT
Start: 2022-09-15 | End: 2022-09-16 | Stop reason: SURG

## 2022-09-15 RX ORDER — BUPIVACAINE HYDROCHLORIDE AND EPINEPHRINE 5; 5 MG/ML; UG/ML
INJECTION, SOLUTION EPIDURAL; INTRACAUDAL; PERINEURAL AS NEEDED
Status: DISCONTINUED | OUTPATIENT
Start: 2022-09-15 | End: 2022-09-16 | Stop reason: HOSPADM

## 2022-09-15 RX ORDER — SODIUM CHLORIDE, SODIUM LACTATE, POTASSIUM CHLORIDE, CALCIUM CHLORIDE 600; 310; 30; 20 MG/100ML; MG/100ML; MG/100ML; MG/100ML
INJECTION, SOLUTION INTRAVENOUS CONTINUOUS
Status: DISCONTINUED | OUTPATIENT
Start: 2022-09-15 | End: 2022-09-16

## 2022-09-15 RX ORDER — ONDANSETRON 2 MG/ML
4 INJECTION INTRAMUSCULAR; INTRAVENOUS EVERY 6 HOURS PRN
Status: DISCONTINUED | OUTPATIENT
Start: 2022-09-15 | End: 2022-09-16

## 2022-09-15 RX ORDER — ACETAMINOPHEN 500 MG
1000 TABLET ORAL ONCE AS NEEDED
Status: ACTIVE | OUTPATIENT
Start: 2022-09-15 | End: 2022-09-15

## 2022-09-15 RX ORDER — NICOTINE POLACRILEX 4 MG
30 LOZENGE BUCCAL
Status: DISCONTINUED | OUTPATIENT
Start: 2022-09-15 | End: 2022-09-19

## 2022-09-15 RX ORDER — NICOTINE POLACRILEX 4 MG
15 LOZENGE BUCCAL
Status: DISCONTINUED | OUTPATIENT
Start: 2022-09-15 | End: 2022-09-19

## 2022-09-15 RX ORDER — HYDROMORPHONE HYDROCHLORIDE 1 MG/ML
0.4 INJECTION, SOLUTION INTRAMUSCULAR; INTRAVENOUS; SUBCUTANEOUS EVERY 5 MIN PRN
Status: DISCONTINUED | OUTPATIENT
Start: 2022-09-15 | End: 2022-09-16

## 2022-09-15 RX ORDER — SODIUM CHLORIDE, SODIUM LACTATE, POTASSIUM CHLORIDE, CALCIUM CHLORIDE 600; 310; 30; 20 MG/100ML; MG/100ML; MG/100ML; MG/100ML
INJECTION, SOLUTION INTRAVENOUS CONTINUOUS PRN
Status: DISCONTINUED | OUTPATIENT
Start: 2022-09-15 | End: 2022-09-16 | Stop reason: SURG

## 2022-09-15 RX ORDER — NALOXONE HYDROCHLORIDE 0.4 MG/ML
80 INJECTION, SOLUTION INTRAMUSCULAR; INTRAVENOUS; SUBCUTANEOUS AS NEEDED
Status: ACTIVE | OUTPATIENT
Start: 2022-09-15 | End: 2022-09-16

## 2022-09-15 RX ORDER — DIPHENHYDRAMINE HYDROCHLORIDE 50 MG/ML
12.5 INJECTION INTRAMUSCULAR; INTRAVENOUS AS NEEDED
Status: ACTIVE | OUTPATIENT
Start: 2022-09-15 | End: 2022-09-16

## 2022-09-15 RX ORDER — IOHEXOL 350 MG/ML
100 INJECTION, SOLUTION INTRAVENOUS
Status: COMPLETED | OUTPATIENT
Start: 2022-09-15 | End: 2022-09-15

## 2022-09-15 RX ORDER — METOCLOPRAMIDE HYDROCHLORIDE 5 MG/ML
10 INJECTION INTRAMUSCULAR; INTRAVENOUS EVERY 8 HOURS PRN
Status: DISCONTINUED | OUTPATIENT
Start: 2022-09-15 | End: 2022-09-16

## 2022-09-15 RX ORDER — DEXTROSE MONOHYDRATE 25 G/50ML
50 INJECTION, SOLUTION INTRAVENOUS
Status: DISCONTINUED | OUTPATIENT
Start: 2022-09-15 | End: 2022-09-19

## 2022-09-15 RX ORDER — HYDROCODONE BITARTRATE AND ACETAMINOPHEN 5; 325 MG/1; MG/1
2 TABLET ORAL ONCE AS NEEDED
Status: ACTIVE | OUTPATIENT
Start: 2022-09-15 | End: 2022-09-15

## 2022-09-15 RX ORDER — METOPROLOL TARTRATE 5 MG/5ML
2.5 INJECTION INTRAVENOUS ONCE
Status: DISCONTINUED | OUTPATIENT
Start: 2022-09-15 | End: 2022-09-16

## 2022-09-15 RX ORDER — HYDROMORPHONE HYDROCHLORIDE 1 MG/ML
0.2 INJECTION, SOLUTION INTRAMUSCULAR; INTRAVENOUS; SUBCUTANEOUS EVERY 5 MIN PRN
Status: DISCONTINUED | OUTPATIENT
Start: 2022-09-15 | End: 2022-09-16

## 2022-09-15 RX ORDER — ROCURONIUM BROMIDE 10 MG/ML
INJECTION, SOLUTION INTRAVENOUS AS NEEDED
Status: DISCONTINUED | OUTPATIENT
Start: 2022-09-15 | End: 2022-09-16 | Stop reason: SURG

## 2022-09-15 RX ADMIN — PHENYLEPHRINE HCL 100 MCG: 10 MG/ML VIAL (ML) INJECTION at 21:59:00

## 2022-09-15 RX ADMIN — ROCURONIUM BROMIDE 10 MG: 10 INJECTION, SOLUTION INTRAVENOUS at 23:14:00

## 2022-09-15 RX ADMIN — ROCURONIUM BROMIDE 10 MG: 10 INJECTION, SOLUTION INTRAVENOUS at 21:51:00

## 2022-09-15 RX ADMIN — SODIUM CHLORIDE, SODIUM LACTATE, POTASSIUM CHLORIDE, CALCIUM CHLORIDE: 600; 310; 30; 20 INJECTION, SOLUTION INTRAVENOUS at 21:18:00

## 2022-09-15 RX ADMIN — ROCURONIUM BROMIDE 10 MG: 10 INJECTION, SOLUTION INTRAVENOUS at 22:56:00

## 2022-09-15 RX ADMIN — PHENYLEPHRINE HCL 100 MCG: 10 MG/ML VIAL (ML) INJECTION at 21:36:00

## 2022-09-15 RX ADMIN — ROCURONIUM BROMIDE 50 MG: 10 INJECTION, SOLUTION INTRAVENOUS at 21:25:00

## 2022-09-15 RX ADMIN — PHENYLEPHRINE HCL 100 MCG: 10 MG/ML VIAL (ML) INJECTION at 21:31:00

## 2022-09-15 RX ADMIN — PHENYLEPHRINE HCL 100 MCG: 10 MG/ML VIAL (ML) INJECTION at 21:34:00

## 2022-09-15 NOTE — PROGRESS NOTES
Pt A&Ox1, somnolent @0800, wakes to sternal rubs. By 1100, pt alert and tells this RN name and yes/no. Per family, pt eats well at baseline and is \"very alert\". Pt turned for comfort, hourly rounded on, MAKI on, call light within reach. Reoriented frequently. All scheduled meds administered per [facility]. IVF maintained. Delirium precautions in place. Family educated on [address].

## 2022-09-15 NOTE — PLAN OF CARE
Assumed care of pt at [facility], pt is lethargic able to be aroused to take meds. Does not follow commands or have a conversation. Pt did say hello this morning. Remains on ivf and iv antibx.

## 2022-09-16 ENCOUNTER — APPOINTMENT (OUTPATIENT)
Dept: GENERAL RADIOLOGY | Facility: HOSPITAL | Age: 87
DRG: 417 | End: 2022-09-16
Attending: NURSE PRACTITIONER
Payer: MEDICARE

## 2022-09-16 LAB
ALBUMIN SERPL-MCNC: 2 G/DL (ref 3.4–5)
ALBUMIN/GLOB SERPL: 0.4 {RATIO} (ref 1–2)
ALP LIVER SERPL-CCNC: 192 U/L
ALT SERPL-CCNC: 10 U/L
ANION GAP SERPL CALC-SCNC: 7 MMOL/L (ref 0–18)
AST SERPL-CCNC: 119 U/L (ref 15–37)
BASOPHILS # BLD: 0 X10(3) UL (ref 0–0.2)
BASOPHILS NFR BLD: 0 %
BILIRUB SERPL-MCNC: 1.5 MG/DL (ref 0.1–2)
BUN BLD-MCNC: 22 MG/DL (ref 7–18)
CALCIUM BLD-MCNC: 8.5 MG/DL (ref 8.5–10.1)
CHLORIDE SERPL-SCNC: 108 MMOL/L (ref 98–112)
CO2 SERPL-SCNC: 22 MMOL/L (ref 21–32)
CREAT BLD-MCNC: 0.8 MG/DL
EOSINOPHIL # BLD: 0.23 X10(3) UL (ref 0–0.7)
EOSINOPHIL NFR BLD: 1 %
ERYTHROCYTE [DISTWIDTH] IN BLOOD BY AUTOMATED COUNT: 14.7 %
GFR SERPLBLD BASED ON 1.73 SQ M-ARVRAT: 72 ML/MIN/1.73M2 (ref 60–?)
GLOBULIN PLAS-MCNC: 4.6 G/DL (ref 2.8–4.4)
GLUCOSE BLD-MCNC: 138 MG/DL (ref 70–99)
GLUCOSE BLD-MCNC: 159 MG/DL (ref 70–99)
HCT VFR BLD AUTO: 37.2 %
HGB BLD-MCNC: 11.6 G/DL
INR BLD: 1.23 (ref 0.85–1.16)
LYMPHOCYTES NFR BLD: 1.81 X10(3) UL (ref 1–4)
LYMPHOCYTES NFR BLD: 8 %
MCH RBC QN AUTO: 30.1 PG (ref 26–34)
MCHC RBC AUTO-ENTMCNC: 31.2 G/DL (ref 31–37)
MCV RBC AUTO: 96.6 FL
MONOCYTES # BLD: 0.9 X10(3) UL (ref 0.1–1)
MONOCYTES NFR BLD: 4 %
MORPHOLOGY: NORMAL
NEUTROPHILS # BLD AUTO: 20.37 X10 (3) UL (ref 1.5–7.7)
NEUTROPHILS NFR BLD: 81 %
NEUTS BAND NFR BLD: 6 %
NEUTS HYPERSEG # BLD: 19.66 X10(3) UL (ref 1.5–7.7)
OSMOLALITY SERPL CALC.SUM OF ELEC: 291 MOSM/KG (ref 275–295)
PLATELET # BLD AUTO: 280 10(3)UL (ref 150–450)
PLATELET MORPHOLOGY: NORMAL
POTASSIUM SERPL-SCNC: 3.7 MMOL/L (ref 3.5–5.1)
PROT SERPL-MCNC: 6.6 G/DL (ref 6.4–8.2)
PROTHROMBIN TIME: 15.5 SECONDS (ref 11.6–14.8)
RBC # BLD AUTO: 3.85 X10(6)UL
SODIUM SERPL-SCNC: 137 MMOL/L (ref 136–145)
TOTAL CELLS COUNTED BLD: 100
WBC # BLD AUTO: 22.6 X10(3) UL (ref 4–11)

## 2022-09-16 PROCEDURE — 47563 LAPARO CHOLECYSTECTOMY/GRAPH: CPT | Performed by: COLON & RECTAL SURGERY

## 2022-09-16 PROCEDURE — 3E0M45Z INTRODUCTION OF ADHESION BARRIER INTO PERITONEAL CAVITY, PERCUTANEOUS ENDOSCOPIC APPROACH: ICD-10-PCS | Performed by: SURGERY

## 2022-09-16 PROCEDURE — BF5C2Z0 OTHER IMAGING OF HEPATOBILIARY SYSTEM, ALL USING FLUORESCING AGENT, INTRAOPERATIVE: ICD-10-PCS | Performed by: SURGERY

## 2022-09-16 PROCEDURE — 71045 X-RAY EXAM CHEST 1 VIEW: CPT | Performed by: NURSE PRACTITIONER

## 2022-09-16 PROCEDURE — 47563 LAPARO CHOLECYSTECTOMY/GRAPH: CPT | Performed by: SURGERY

## 2022-09-16 PROCEDURE — 99291 CRITICAL CARE FIRST HOUR: CPT | Performed by: NURSE PRACTITIONER

## 2022-09-16 PROCEDURE — 0WQF0ZZ REPAIR ABDOMINAL WALL, OPEN APPROACH: ICD-10-PCS | Performed by: SURGERY

## 2022-09-16 PROCEDURE — 0FT44ZZ RESECTION OF GALLBLADDER, PERCUTANEOUS ENDOSCOPIC APPROACH: ICD-10-PCS | Performed by: SURGERY

## 2022-09-16 PROCEDURE — 99232 SBSQ HOSP IP/OBS MODERATE 35: CPT | Performed by: HOSPITALIST

## 2022-09-16 RX ORDER — METOCLOPRAMIDE HYDROCHLORIDE 5 MG/ML
10 INJECTION INTRAMUSCULAR; INTRAVENOUS EVERY 8 HOURS PRN
Status: DISCONTINUED | OUTPATIENT
Start: 2022-09-16 | End: 2022-09-16

## 2022-09-16 RX ORDER — HEPARIN SODIUM 5000 [USP'U]/ML
5000 INJECTION, SOLUTION INTRAVENOUS; SUBCUTANEOUS EVERY 12 HOURS SCHEDULED
Status: DISCONTINUED | OUTPATIENT
Start: 2022-09-16 | End: 2022-09-16

## 2022-09-16 RX ORDER — MORPHINE SULFATE 2 MG/ML
1 INJECTION, SOLUTION INTRAMUSCULAR; INTRAVENOUS EVERY 2 HOUR PRN
Status: DISCONTINUED | OUTPATIENT
Start: 2022-09-16 | End: 2022-09-19

## 2022-09-16 RX ORDER — FAMOTIDINE 20 MG/1
20 TABLET, FILM COATED ORAL DAILY
Status: DISCONTINUED | OUTPATIENT
Start: 2022-09-16 | End: 2022-09-19

## 2022-09-16 RX ORDER — MORPHINE SULFATE 2 MG/ML
2 INJECTION, SOLUTION INTRAMUSCULAR; INTRAVENOUS EVERY 2 HOUR PRN
Status: DISCONTINUED | OUTPATIENT
Start: 2022-09-16 | End: 2022-09-19

## 2022-09-16 RX ORDER — MORPHINE SULFATE 2 MG/ML
0.5 INJECTION, SOLUTION INTRAMUSCULAR; INTRAVENOUS EVERY 2 HOUR PRN
Status: DISCONTINUED | OUTPATIENT
Start: 2022-09-16 | End: 2022-09-19

## 2022-09-16 RX ORDER — ONDANSETRON 2 MG/ML
4 INJECTION INTRAMUSCULAR; INTRAVENOUS EVERY 6 HOURS PRN
Status: DISCONTINUED | OUTPATIENT
Start: 2022-09-16 | End: 2022-09-16

## 2022-09-16 RX ORDER — SODIUM CHLORIDE, SODIUM LACTATE, POTASSIUM CHLORIDE, CALCIUM CHLORIDE 600; 310; 30; 20 MG/100ML; MG/100ML; MG/100ML; MG/100ML
INJECTION, SOLUTION INTRAVENOUS CONTINUOUS
Status: DISCONTINUED | OUTPATIENT
Start: 2022-09-16 | End: 2022-09-16

## 2022-09-16 RX ORDER — FAMOTIDINE 10 MG/ML
20 INJECTION, SOLUTION INTRAVENOUS DAILY
Status: DISCONTINUED | OUTPATIENT
Start: 2022-09-16 | End: 2022-09-19

## 2022-09-16 RX ORDER — SODIUM CHLORIDE 9 MG/ML
INJECTION, SOLUTION INTRAVENOUS CONTINUOUS
Status: DISCONTINUED | OUTPATIENT
Start: 2022-09-16 | End: 2022-09-18

## 2022-09-16 RX ORDER — ACETAMINOPHEN 650 MG/1
650 SUPPOSITORY RECTAL EVERY 6 HOURS PRN
Status: DISCONTINUED | OUTPATIENT
Start: 2022-09-16 | End: 2022-09-19

## 2022-09-16 RX ADMIN — ROCURONIUM BROMIDE 10 MG: 10 INJECTION, SOLUTION INTRAVENOUS at 00:09:00

## 2022-09-16 NOTE — SLP NOTE
Order received, chart reviewed. D/W RN. Patient not appropriate for swallow evaluation at this time due to obtunded status. Patient baseline diet noted per transfer paperwork from SNF is vegetarian \"mechanical soft, thin liquids. \"    SLP to re-attempt tomorrow, as able.    Stan Balderrama, MS LACY-SLP/L, pager [phone]  Speech-LanguagePathologist

## 2022-09-16 NOTE — ANESTHESIA PROCEDURE NOTES
Airway  Date/Time: 9/15/2022 9:30 PM  Urgency: elective      General Information and Staff    Patient location during procedure: OR  Anesthesiologist: Gay Victoria MD  Performed: anesthesiologist     Indications and Patient Condition  Indications for airway management: anesthesia  Sedation level: deep  Preoxygenated: yes  Patient position: sniffing  Mask difficulty assessment: 1 - vent by mask    Final Airway Details  Final airway type: endotracheal airway      Successful airway: ETT  Cuffed: yes   Successful intubation technique: direct laryngoscopy  Endotracheal tube insertion site: oral  Blade: Christal  Blade size: #3  ETT size (mm): 7.0    Cormack-Lehane Classification: grade IIA - partial view of glottis  Placement verified by: chest auscultation and capnometry   Measured from: lips  ETT to lips (cm): 20  Number of attempts at approach: 1

## 2022-09-16 NOTE — PROGRESS NOTES
Pharmacy Note:  Stress Ulcer Prophylaxis Initialization        Jerrica Santos is a 82year old female who meets criteria for the initiation of stress ulcer prophylaxis based since she takes famotidine PTA. Ordered famotidine 20mg q24hr per pharmacy protocol.         Thank you,   Raya Carlton, PharmD, BCPS  9/16/2022  10:32 AM Klisyri Counseling:  I discussed with the patient the risks of Klisyri including but not limited to erythema, scaling, itching, weeping, crusting, and pain.

## 2022-09-16 NOTE — PLAN OF CARE
Received patient from OR around 0100. Patient recovered in ICU, per protocol. Somnolent, not withdrawing from pain. Maintaining airway, oxygen saturations stable on room air. Subcutaneous emphysema noted on assessment, Aler APN at bedside, stat chest xray ordered. NSR. BP stable. JOSE x1. Purewick in place. Withdrawing from pain/opening eyes to pain in later assessments.

## 2022-09-16 NOTE — OCCUPATIONAL THERAPY NOTE
Attempted to see pt for skilled OT services this am. Pt is currently somnolent and not following commands. Will re-attempt as schedule allows.  Dahiana Dumont, 09/16/22

## 2022-09-16 NOTE — PHYSICAL THERAPY NOTE
PT orders received, chart reviewed. Per RN, pt not appropriate for therapy at this time due to lethargy. Will continue to follow as appropriate.

## 2022-09-16 NOTE — PLAN OF CARE
Pt to transfer to [facility], report called to [facility]. Daughter at bedside made aware of transfer and son called to make aware as well.

## 2022-09-17 ENCOUNTER — APPOINTMENT (OUTPATIENT)
Dept: GENERAL RADIOLOGY | Facility: HOSPITAL | Age: 87
DRG: 417 | End: 2022-09-17
Attending: STUDENT IN AN ORGANIZED HEALTH CARE EDUCATION/TRAINING PROGRAM
Payer: MEDICARE

## 2022-09-17 LAB
ALBUMIN SERPL-MCNC: 1.9 G/DL (ref 3.4–5)
ALBUMIN/GLOB SERPL: 0.5 {RATIO} (ref 1–2)
ALP LIVER SERPL-CCNC: 132 U/L
ALT SERPL-CCNC: 19 U/L
ANION GAP SERPL CALC-SCNC: 8 MMOL/L (ref 0–18)
AST SERPL-CCNC: 34 U/L (ref 15–37)
BASOPHILS # BLD AUTO: 0.01 X10(3) UL (ref 0–0.2)
BASOPHILS NFR BLD AUTO: 0.1 %
BILIRUB SERPL-MCNC: 0.7 MG/DL (ref 0.1–2)
BUN BLD-MCNC: 26 MG/DL (ref 7–18)
CALCIUM BLD-MCNC: 8.7 MG/DL (ref 8.5–10.1)
CHLORIDE SERPL-SCNC: 113 MMOL/L (ref 98–112)
CO2 SERPL-SCNC: 21 MMOL/L (ref 21–32)
CREAT BLD-MCNC: 0.61 MG/DL
EOSINOPHIL # BLD AUTO: 0 X10(3) UL (ref 0–0.7)
EOSINOPHIL NFR BLD AUTO: 0 %
ERYTHROCYTE [DISTWIDTH] IN BLOOD BY AUTOMATED COUNT: 14.9 %
GFR SERPLBLD BASED ON 1.73 SQ M-ARVRAT: 88 ML/MIN/1.73M2 (ref 60–?)
GLOBULIN PLAS-MCNC: 4.1 G/DL (ref 2.8–4.4)
GLUCOSE BLD-MCNC: 99 MG/DL (ref 70–99)
HCT VFR BLD AUTO: 32.7 %
HGB BLD-MCNC: 10.5 G/DL
IMM GRANULOCYTES # BLD AUTO: 0.06 X10(3) UL (ref 0–1)
IMM GRANULOCYTES NFR BLD: 0.6 %
LYMPHOCYTES # BLD AUTO: 1.01 X10(3) UL (ref 1–4)
LYMPHOCYTES NFR BLD AUTO: 9.7 %
MAGNESIUM SERPL-MCNC: 2.2 MG/DL (ref 1.6–2.6)
MCH RBC QN AUTO: 29.8 PG (ref 26–34)
MCHC RBC AUTO-ENTMCNC: 32.1 G/DL (ref 31–37)
MCV RBC AUTO: 92.9 FL
MONOCYTES # BLD AUTO: 0.74 X10(3) UL (ref 0.1–1)
MONOCYTES NFR BLD AUTO: 7.1 %
NEUTROPHILS # BLD AUTO: 8.64 X10 (3) UL (ref 1.5–7.7)
NEUTROPHILS # BLD AUTO: 8.64 X10(3) UL (ref 1.5–7.7)
NEUTROPHILS NFR BLD AUTO: 82.5 %
OSMOLALITY SERPL CALC.SUM OF ELEC: 299 MOSM/KG (ref 275–295)
PHOSPHATE SERPL-MCNC: 2.1 MG/DL (ref 2.5–4.9)
PLATELET # BLD AUTO: 290 10(3)UL (ref 150–450)
POTASSIUM SERPL-SCNC: 3.5 MMOL/L (ref 3.5–5.1)
PROT SERPL-MCNC: 6 G/DL (ref 6.4–8.2)
RBC # BLD AUTO: 3.52 X10(6)UL
SODIUM SERPL-SCNC: 142 MMOL/L (ref 136–145)
WBC # BLD AUTO: 10.5 X10(3) UL (ref 4–11)

## 2022-09-17 PROCEDURE — 71045 X-RAY EXAM CHEST 1 VIEW: CPT | Performed by: STUDENT IN AN ORGANIZED HEALTH CARE EDUCATION/TRAINING PROGRAM

## 2022-09-17 PROCEDURE — 99232 SBSQ HOSP IP/OBS MODERATE 35: CPT | Performed by: HOSPITALIST

## 2022-09-17 NOTE — OCCUPATIONAL THERAPY NOTE
Orders received charts reviewed. Pt with bedrest orders starting 9/15/22. Pt will need activity orders. OT will follow up as able and appropriate.

## 2022-09-17 NOTE — PHYSICAL THERAPY NOTE
PT orders received, chart reviewed, Pt with continuous bedrest orders starting 9/15/22. RN notified that when pt is appropriate for PT, pt will need activity orders. RN understanding. Will continue to follow pt peripherally and see pt when appropriate.  MEGAN

## 2022-09-17 NOTE — PLAN OF CARE
2230: Bladder scan performed with 230 ml. Will continue to monitor. 0230: Bladder scan shows 417 ml. Straight cath at 0330, 550 ml obtained.        Problem: GENITOURINARY  Goal: Maintain optimal urinary function  Description: Interventions:  - Assess ability to void  - Assess for bladder distention  - Monitor creatinine and BUN  - Monitor intake and output  - Insert urinary catheter as ordered  - Obtain appropriate imaging as ordered  Outcome: Progressing

## 2022-09-17 NOTE — PLAN OF CARE
Pt opens eye when name called or to gentle touch. Will occasionally respond to questions with one word answers. Positioned for comfort. Turn q2h  St eval & treat today. Recommendations in progress noted from speech  Ok to adat. Dtr in law here this am & states pt can only eat kosher foods. Daughter here @ this time & states pt can eat kosher foods, but pt is a vegetarian. Appetite remains poor. Lap sites x 3 to abdomen. Two lap sites cdi: closed w/ ss & bandaids. One lap site oozing scant amount of serosanguinous fluid. Bandaid removed and steri strips remain intact, surgical sites cleansed w/ chg.  All area well approximated. Gab remains to bulb suction          Problem: Patient/Family Goals  Goal: Patient/Family Long Term Goal  Description: Patient's Long Term Goal: discharge    Interventions:  - st eval & treat today. Diet advanced to fld  - See additional Care Plan goals for specific interventions  Outcome: Progressing  Goal: Patient/Family Short Term Goal  Description: Patient's Short Term Goal: discharge    Interventions:   - st eval & treat today.    Diet advanced to fld  - See additional Care Plan goals for specific interventions  Outcome: Progressing     Problem: GENITOURINARY  Goal: Maintain optimal urinary function  Description: Interventions:  - Assess ability to void  - Assess for bladder distention  - Monitor creatinine and BUN  - Monitor intake and output  - Insert urinary catheter as ordered  - Obtain appropriate imaging as ordered  Outcome: Progressing     Problem: Integumentary status not within defined limits  Goal: Pt's integumentary status will be adequate for discharge  Outcome: Progressing     Problem: GASTROINTESTINAL - ADULT  Goal: Minimal or absence of nausea and vomiting  Description: INTERVENTIONS:  - Maintain adequate hydration with IV or PO as ordered and tolerated  - Nasogastric tube to low intermittent suction as ordered  - Evaluate effectiveness of ordered antiemetic medications  - Provide nonpharmacologic comfort measures as appropriate  - Advance diet as tolerated, if ordered  - Obtain nutritional consult as needed  - Evaluate fluid balance  Outcome: Progressing  Goal: Maintains or returns to baseline bowel function  Description: INTERVENTIONS:  - Assess bowel function  - Maintain adequate hydration with IV or PO as ordered and tolerated  - Evaluate effectiveness of GI medications  - Encourage mobilization and activity  - Obtain nutritional consult as needed  - Establish a toileting routine/schedule  - Consider collaborating with pharmacy to review patient's medication profile  Outcome: Progressing  Goal: Maintains adequate nutritional intake (undernourished)  Description: INTERVENTIONS:  - Monitor percentage of each meal consumed  - Identify factors contributing to decreased intake, treat as appropriate  - Assist with meals as needed  - Monitor I&O, WT and lab values  - Obtain nutritional consult as needed  - Optimize oral hygiene and moisture  - Encourage food from home; allow for food preferences  - Enhance eating environment  Outcome: Progressing  Goal: Achieves appropriate nutritional intake (bariatric)  Description: INTERVENTIONS:  - Monitor for over-consumption  - Identify factors contributing to increased intake, treat as appropriate  - Monitor I&O, WT and lab values  - Obtain nutritional consult as needed  - Evaluate psychosocial factors contributing to over-consumption  Outcome: Progressing     Problem: METABOLIC/FLUID AND ELECTROLYTES - ADULT  Goal: Electrolytes maintained within normal limits  Description: INTERVENTIONS:  - Monitor labs and rhythm and assess patient for signs and symptoms of electrolyte imbalances  - Administer electrolyte replacement as ordered  - Monitor response to electrolyte replacements, including rhythm and repeat lab results as appropriate  - Fluid restriction as ordered  - Instruct patient on fluid and nutrition restrictions as appropriate  Outcome: Progressing     Problem: SKIN/TISSUE INTEGRITY - ADULT  Goal: Incision(s), wounds(s) or drain site(s) healing without S/S of infection  Description: INTERVENTIONS:  - Assess and document risk factors for pressure ulcer development  - Assess and document skin integrity  - Assess and document dressing/incision, wound bed, drain sites and surrounding tissue  - Implement wound care per orders  - Initiate isolation precautions as appropriate  - Initiate Pressure Ulcer prevention bundle as indicated  Outcome: Progressing

## 2022-09-18 LAB
ALBUMIN SERPL-MCNC: 1.8 G/DL (ref 3.4–5)
ALBUMIN/GLOB SERPL: 0.5 {RATIO} (ref 1–2)
ALP LIVER SERPL-CCNC: 106 U/L
ALT SERPL-CCNC: 8 U/L
ANION GAP SERPL CALC-SCNC: 5 MMOL/L (ref 0–18)
AST SERPL-CCNC: 23 U/L (ref 15–37)
BASOPHILS # BLD AUTO: 0 X10(3) UL (ref 0–0.2)
BASOPHILS NFR BLD AUTO: 0 %
BILIRUB SERPL-MCNC: 0.6 MG/DL (ref 0.1–2)
BUN BLD-MCNC: 20 MG/DL (ref 7–18)
CALCIUM BLD-MCNC: 7.9 MG/DL (ref 8.5–10.1)
CHLORIDE SERPL-SCNC: 115 MMOL/L (ref 98–112)
CO2 SERPL-SCNC: 22 MMOL/L (ref 21–32)
CREAT BLD-MCNC: 0.53 MG/DL
EOSINOPHIL # BLD AUTO: 0.05 X10(3) UL (ref 0–0.7)
EOSINOPHIL NFR BLD AUTO: 0.8 %
ERYTHROCYTE [DISTWIDTH] IN BLOOD BY AUTOMATED COUNT: 14.8 %
GFR SERPLBLD BASED ON 1.73 SQ M-ARVRAT: 91 ML/MIN/1.73M2 (ref 60–?)
GLOBULIN PLAS-MCNC: 3.8 G/DL (ref 2.8–4.4)
GLUCOSE BLD-MCNC: 87 MG/DL (ref 70–99)
HCT VFR BLD AUTO: 32.1 %
HGB BLD-MCNC: 10.2 G/DL
IMM GRANULOCYTES # BLD AUTO: 0.05 X10(3) UL (ref 0–1)
IMM GRANULOCYTES NFR BLD: 0.8 %
LYMPHOCYTES # BLD AUTO: 1.96 X10(3) UL (ref 1–4)
LYMPHOCYTES NFR BLD AUTO: 29.7 %
MAGNESIUM SERPL-MCNC: 1.9 MG/DL (ref 1.6–2.6)
MCH RBC QN AUTO: 29.9 PG (ref 26–34)
MCHC RBC AUTO-ENTMCNC: 31.8 G/DL (ref 31–37)
MCV RBC AUTO: 94.1 FL
MONOCYTES # BLD AUTO: 0.67 X10(3) UL (ref 0.1–1)
MONOCYTES NFR BLD AUTO: 10.2 %
NEUTROPHILS # BLD AUTO: 3.86 X10 (3) UL (ref 1.5–7.7)
NEUTROPHILS # BLD AUTO: 3.86 X10(3) UL (ref 1.5–7.7)
NEUTROPHILS NFR BLD AUTO: 58.5 %
OSMOLALITY SERPL CALC.SUM OF ELEC: 296 MOSM/KG (ref 275–295)
PHOSPHATE SERPL-MCNC: 2.5 MG/DL (ref 2.5–4.9)
PLATELET # BLD AUTO: 297 10(3)UL (ref 150–450)
POTASSIUM SERPL-SCNC: 3 MMOL/L (ref 3.5–5.1)
POTASSIUM SERPL-SCNC: 3.5 MMOL/L (ref 3.5–5.1)
PROT SERPL-MCNC: 5.6 G/DL (ref 6.4–8.2)
RBC # BLD AUTO: 3.41 X10(6)UL
SODIUM SERPL-SCNC: 142 MMOL/L (ref 136–145)
WBC # BLD AUTO: 6.6 X10(3) UL (ref 4–11)

## 2022-09-18 PROCEDURE — 99232 SBSQ HOSP IP/OBS MODERATE 35: CPT | Performed by: HOSPITALIST

## 2022-09-18 NOTE — PLAN OF CARE
Assumed care at [address] pt drowsy but arousable. Responds to her anme  Able to tell me her name  Crushed meds with apple sauce  Repositioned  Voiding per diaper. IVF, IV antibiotic. Vital signs stable.   Problem: GENITOURINARY  Goal: Maintain optimal urinary function  Description: Interventions:  - Assess ability to void  - Assess for bladder distention  - Monitor creatinine and BUN  - Monitor intake and output  - Insert urinary catheter as ordered  - Obtain appropriate imaging as ordered  Outcome: Progressing     Problem: Integumentary status not within defined limits  Goal: Pt's integumentary status will be adequate for discharge  Outcome: Progressing     Problem: GASTROINTESTINAL - ADULT  Goal: Minimal or absence of nausea and vomiting  Description: INTERVENTIONS:  - Maintain adequate hydration with IV or PO as ordered and tolerated  - Nasogastric tube to low intermittent suction as ordered  - Evaluate effectiveness of ordered antiemetic medications  - Provide nonpharmacologic comfort measures as appropriate  - Advance diet as tolerated, if ordered  - Obtain nutritional consult as needed  - Evaluate fluid balance  Outcome: Progressing  Goal: Maintains or returns to baseline bowel function  Description: INTERVENTIONS:  - Assess bowel function  - Maintain adequate hydration with IV or PO as ordered and tolerated  - Evaluate effectiveness of GI medications  - Encourage mobilization and activity  - Obtain nutritional consult as needed  - Establish a toileting routine/schedule  - Consider collaborating with pharmacy to review patient's medication profile  Outcome: Progressing  Goal: Maintains adequate nutritional intake (undernourished)  Description: INTERVENTIONS:  - Monitor percentage of each meal consumed  - Identify factors contributing to decreased intake, treat as appropriate  - Assist with meals as needed  - Monitor I&O, WT and lab values  - Obtain nutritional consult as needed  - Optimize oral hygiene and moisture  - Encourage food from home; allow for food preferences  - Enhance eating environment  Outcome: Progressing     Problem: METABOLIC/FLUID AND ELECTROLYTES - ADULT  Goal: Electrolytes maintained within normal limits  Description: INTERVENTIONS:  - Monitor labs and rhythm and assess patient for signs and symptoms of electrolyte imbalances  - Administer electrolyte replacement as ordered  - Monitor response to electrolyte replacements, including rhythm and repeat lab results as appropriate  - Fluid restriction as ordered  - Instruct patient on fluid and nutrition restrictions as appropriate  Outcome: Progressing     Problem: SKIN/TISSUE INTEGRITY - ADULT  Goal: Incision(s), wounds(s) or drain site(s) healing without S/S of infection  Description: INTERVENTIONS:  - Assess and document risk factors for pressure ulcer development  - Assess and document skin integrity  - Assess and document dressing/incision, wound bed, drain sites and surrounding tissue  - Implement wound care per orders  - Initiate isolation precautions as appropriate  - Initiate Pressure Ulcer prevention bundle as indicated  Outcome: Progressing

## 2022-09-18 NOTE — PLAN OF CARE
Pt a&o x 1. Grandson here to visit pt today & states this is pt's baseline    Strict aspiration precautions continued. Pt requires 1:1 w/ feeding. Pt able to take po meds crushed in applesauce. ample time given for pt to swallow between bites. Pt noted pocketing food w/ breakfast.   Staff stopped feeding pt after noticing. Lunch tray ordered. Pt continues to needs multiple instructions to swallow food. Vss, maintaining sats on ra  Incont of urine, using adult briefs  Turn q2h & prn    Lap sites x 2 well cdi, well approximated & closed w/ steri strips & bandaid. Gab remains to bulb suction. Producing serosanguinous fluid  Lap site x 1 noted with old bloody drainage.    Area remains well approximated & closed w/ skin glue    Pt/ot eval & treat today    Per surgical services, anticipate discharge tomorrow                  Problem: Patient/Family Goals  Goal: Patient/Family Long Term Goal  Description: Patient's Long Term Goal: discharge    Interventions:  - sw consulted for anticipated discharge tomorrow  - See additional Care Plan goals for specific interventions  9/18/2022 1414 by Carlyn Saeed RN  Outcome: Progressing  9/18/2022 1412 by Carlyn Saeed RN  Outcome: Progressing  Goal: Patient/Family Short Term Goal  Description: Patient's Short Term Goal: discharge    Interventions:   - tolerating soft, pureed diet  - See additional Care Plan goals for specific interventions  9/18/2022 1414 by Carlyn Saeed RN  Outcome: Progressing  9/18/2022 1412 by Carlyn Saeed RN  Outcome: Progressing     Problem: GENITOURINARY  Goal: Maintain optimal urinary function  Description: Interventions:  - Assess ability to void  - Assess for bladder distention  - Monitor creatinine and BUN  - Monitor intake and output  - Insert urinary catheter as ordered  - Obtain appropriate imaging as ordered  9/18/2022 1414 by Carlyn Saeed RN  Outcome: Progressing  9/18/2022 1412 by Carlyn Saeed RN  Outcome: Progressing     Problem: Integumentary status not within defined limits  Goal: Pt's integumentary status will be adequate for discharge  9/18/2022 1414 by Cydney Najjar, RN  Outcome: Progressing  9/18/2022 1412 by Cydney Najjar, RN  Outcome: Progressing     Problem: GASTROINTESTINAL - ADULT  Goal: Minimal or absence of nausea and vomiting  Description: INTERVENTIONS:  - Maintain adequate hydration with IV or PO as ordered and tolerated  - Nasogastric tube to low intermittent suction as ordered  - Evaluate effectiveness of ordered antiemetic medications  - Provide nonpharmacologic comfort measures as appropriate  - Advance diet as tolerated, if ordered  - Obtain nutritional consult as needed  - Evaluate fluid balance  9/18/2022 1414 by Cydney Najjar, RN  Outcome: Progressing  9/18/2022 1412 by Cydney Najjar, RN  Outcome: Progressing  Goal: Maintains or returns to baseline bowel function  Description: INTERVENTIONS:  - Assess bowel function  - Maintain adequate hydration with IV or PO as ordered and tolerated  - Evaluate effectiveness of GI medications  - Encourage mobilization and activity  - Obtain nutritional consult as needed  - Establish a toileting routine/schedule  - Consider collaborating with pharmacy to review patient's medication profile  9/18/2022 1414 by Cydney Najjar, RN  Outcome: Progressing  9/18/2022 1412 by Cydney Najjar, RN  Outcome: Progressing  Goal: Maintains adequate nutritional intake (undernourished)  Description: INTERVENTIONS:  - Monitor percentage of each meal consumed  - Identify factors contributing to decreased intake, treat as appropriate  - Assist with meals as needed  - Monitor I&O, WT and lab values  - Obtain nutritional consult as needed  - Optimize oral hygiene and moisture  - Encourage food from home; allow for food preferences  - Enhance eating environment  9/18/2022 1414 by Cydney Najjar, RN  Outcome: Progressing  9/18/2022 1412 by Loy Murphy Cristopher Leyden, RN  Outcome: Progressing  Goal: Achieves appropriate nutritional intake (bariatric)  Description: INTERVENTIONS:  - Monitor for over-consumption  - Identify factors contributing to increased intake, treat as appropriate  - Monitor I&O, WT and lab values  - Obtain nutritional consult as needed  - Evaluate psychosocial factors contributing to over-consumption  9/18/2022 1414 by Velma Calvert RN  Outcome: Progressing  9/18/2022 1412 by Velma Calvert RN  Outcome: Progressing     Problem: METABOLIC/FLUID AND ELECTROLYTES - ADULT  Goal: Electrolytes maintained within normal limits  Description: INTERVENTIONS:  - Monitor labs and rhythm and assess patient for signs and symptoms of electrolyte imbalances  - Administer electrolyte replacement as ordered  - Monitor response to electrolyte replacements, including rhythm and repeat lab results as appropriate  - Fluid restriction as ordered  - Instruct patient on fluid and nutrition restrictions as appropriate  9/18/2022 1414 by Velma Calvert RN  Outcome: Progressing  9/18/2022 1412 by Velma Calvert RN  Outcome: Progressing     Problem: SKIN/TISSUE INTEGRITY - ADULT  Goal: Incision(s), wounds(s) or drain site(s) healing without S/S of infection  Description: INTERVENTIONS:  - Assess and document risk factors for pressure ulcer development  - Assess and document skin integrity  - Assess and document dressing/incision, wound bed, drain sites and surrounding tissue  - Implement wound care per orders  - Initiate isolation precautions as appropriate  - Initiate Pressure Ulcer prevention bundle as indicated  9/18/2022 1414 by Velma Calvert RN  Outcome: Progressing  9/18/2022 1412 by Velma Calvert RN  Outcome: Progressing     Problem: Impaired Swallowing  Goal: Minimize aspiration risk  Description: Interventions:  - Patient should be alert and upright for all feedings (90 degrees preferred)  - Offer food and liquids at a slow rate  - No straws  - Encourage small bites of food and small sips of liquid  - Offer pills one at a time, crush or deliver with applesauce as needed  - Discontinue feeding and notify MD (or speech pathologist) if coughing or persistent throat clearing or wet/gurgly vocal quality is noted  Outcome: Progressing

## 2022-09-19 VITALS
SYSTOLIC BLOOD PRESSURE: 172 MMHG | OXYGEN SATURATION: 96 % | TEMPERATURE: 99 F | WEIGHT: 144.81 LBS | DIASTOLIC BLOOD PRESSURE: 88 MMHG | BODY MASS INDEX: 23 KG/M2 | HEART RATE: 101 BPM | RESPIRATION RATE: 29 BRPM

## 2022-09-19 LAB
ALBUMIN SERPL-MCNC: 1.9 G/DL (ref 3.4–5)
ALBUMIN/GLOB SERPL: 0.5 {RATIO} (ref 1–2)
ALP LIVER SERPL-CCNC: 106 U/L
ALT SERPL-CCNC: 7 U/L
ANION GAP SERPL CALC-SCNC: 7 MMOL/L (ref 0–18)
AST SERPL-CCNC: 16 U/L (ref 15–37)
BASOPHILS # BLD AUTO: 0.04 X10(3) UL (ref 0–0.2)
BASOPHILS NFR BLD AUTO: 0.4 %
BILIRUB SERPL-MCNC: 0.6 MG/DL (ref 0.1–2)
BUN BLD-MCNC: 13 MG/DL (ref 7–18)
CALCIUM BLD-MCNC: 8 MG/DL (ref 8.5–10.1)
CHLORIDE SERPL-SCNC: 113 MMOL/L (ref 98–112)
CO2 SERPL-SCNC: 22 MMOL/L (ref 21–32)
CREAT BLD-MCNC: 0.48 MG/DL
EOSINOPHIL # BLD AUTO: 0.13 X10(3) UL (ref 0–0.7)
EOSINOPHIL NFR BLD AUTO: 1.3 %
ERYTHROCYTE [DISTWIDTH] IN BLOOD BY AUTOMATED COUNT: 14.6 %
GFR SERPLBLD BASED ON 1.73 SQ M-ARVRAT: 93 ML/MIN/1.73M2 (ref 60–?)
GLOBULIN PLAS-MCNC: 3.9 G/DL (ref 2.8–4.4)
GLUCOSE BLD-MCNC: 115 MG/DL (ref 70–99)
HCT VFR BLD AUTO: 38 %
HGB BLD-MCNC: 12.2 G/DL
IMM GRANULOCYTES # BLD AUTO: 0.12 X10(3) UL (ref 0–1)
IMM GRANULOCYTES NFR BLD: 1.2 %
LYMPHOCYTES # BLD AUTO: 1.87 X10(3) UL (ref 1–4)
LYMPHOCYTES NFR BLD AUTO: 18.9 %
MAGNESIUM SERPL-MCNC: 1.7 MG/DL (ref 1.6–2.6)
MCH RBC QN AUTO: 30 PG (ref 26–34)
MCHC RBC AUTO-ENTMCNC: 32.1 G/DL (ref 31–37)
MCV RBC AUTO: 93.6 FL
MONOCYTES # BLD AUTO: 0.72 X10(3) UL (ref 0.1–1)
MONOCYTES NFR BLD AUTO: 7.3 %
NEUTROPHILS # BLD AUTO: 7.01 X10 (3) UL (ref 1.5–7.7)
NEUTROPHILS # BLD AUTO: 7.01 X10(3) UL (ref 1.5–7.7)
NEUTROPHILS NFR BLD AUTO: 70.9 %
OSMOLALITY SERPL CALC.SUM OF ELEC: 295 MOSM/KG (ref 275–295)
PHOSPHATE SERPL-MCNC: 2.8 MG/DL (ref 2.5–4.9)
PLATELET # BLD AUTO: 352 10(3)UL (ref 150–450)
POTASSIUM SERPL-SCNC: 3.3 MMOL/L (ref 3.5–5.1)
PROT SERPL-MCNC: 5.8 G/DL (ref 6.4–8.2)
RBC # BLD AUTO: 4.06 X10(6)UL
SARS-COV-2 RNA RESP QL NAA+PROBE: NOT DETECTED
SODIUM SERPL-SCNC: 142 MMOL/L (ref 136–145)
WBC # BLD AUTO: 9.9 X10(3) UL (ref 4–11)

## 2022-09-19 PROCEDURE — 99239 HOSP IP/OBS DSCHRG MGMT >30: CPT | Performed by: INTERNAL MEDICINE

## 2022-09-19 RX ORDER — MAGNESIUM OXIDE 400 MG/1
400 TABLET ORAL ONCE
Status: COMPLETED | OUTPATIENT
Start: 2022-09-19 | End: 2022-09-19

## 2022-09-19 RX ORDER — AMLODIPINE BESYLATE 5 MG/1
5 TABLET ORAL DAILY
Status: DISCONTINUED | OUTPATIENT
Start: 2022-09-19 | End: 2022-09-19

## 2022-09-19 RX ORDER — AMLODIPINE BESYLATE 5 MG/1
5 TABLET ORAL DAILY
Refills: 0 | Status: SHIPPED | COMMUNITY
Start: 2022-09-19

## 2022-09-19 RX ORDER — POTASSIUM CHLORIDE 20 MEQ/1
40 TABLET, EXTENDED RELEASE ORAL ONCE
Status: COMPLETED | OUTPATIENT
Start: 2022-09-19 | End: 2022-09-19

## 2022-09-19 NOTE — PROGRESS NOTES
Report given to  [facility] , answered all questions

## 2022-09-19 NOTE — PROGRESS NOTES
NURSING DISCHARGE NOTE    Discharged Nursing home via Ambulance. Accompanied by Family member and Support staff  Belongings Taken by patient/family. All paper work given to ambulance .  Family at bedside

## 2022-09-19 NOTE — PROGRESS NOTES
AVS completed , spoke w/ [place] ([facility]) RN re: drain orders upon discharge, dc to Liz later today.

## 2022-09-19 NOTE — PLAN OF CARE
Problem: Patient/Family Goals  Goal: Patient/Family Long Term Goal  Description: Patient's Long Term Goal:     Interventions:  -   - See additional Care Plan goals for specific interventions  Outcome: Adequate for Discharge  Goal: Patient/Family Short Term Goal  Description: Patient's Short Term Goal:   Interventions:   -   - See additional Care Plan goals for specific interventions  Outcome: Adequate for Discharge     Problem: GENITOURINARY  Goal: Maintain optimal urinary function  Description: Interventions:  - Assess ability to void  - Assess for bladder distention  - Monitor creatinine and BUN  - Monitor intake and output  - Insert urinary catheter as ordered  - Obtain appropriate imaging as ordered  Outcome: Adequate for Discharge     Problem: Integumentary status not within defined limits  Goal: Pt's integumentary status will be adequate for discharge  Outcome: Adequate for Discharge     Problem: GASTROINTESTINAL - ADULT  Goal: Minimal or absence of nausea and vomiting  Description: INTERVENTIONS:  - Maintain adequate hydration with IV or PO as ordered and tolerated  - Nasogastric tube to low intermittent suction as ordered  - Evaluate effectiveness of ordered antiemetic medications  - Provide nonpharmacologic comfort measures as appropriate  - Advance diet as tolerated, if ordered  - Obtain nutritional consult as needed  - Evaluate fluid balance  Outcome: Adequate for Discharge  Goal: Maintains or returns to baseline bowel function  Description: INTERVENTIONS:  - Assess bowel function  - Maintain adequate hydration with IV or PO as ordered and tolerated  - Evaluate effectiveness of GI medications  - Encourage mobilization and activity  - Obtain nutritional consult as needed  - Establish a toileting routine/schedule  - Consider collaborating with pharmacy to review patient's medication profile  Outcome: Adequate for Discharge  Goal: Maintains adequate nutritional intake (undernourished)  Description: INTERVENTIONS:  - Monitor percentage of each meal consumed  - Identify factors contributing to decreased intake, treat as appropriate  - Assist with meals as needed  - Monitor I&O, WT and lab values  - Obtain nutritional consult as needed  - Optimize oral hygiene and moisture  - Encourage food from home; allow for food preferences  - Enhance eating environment  Outcome: Adequate for Discharge  Goal: Achieves appropriate nutritional intake (bariatric)  Description: INTERVENTIONS:  - Monitor for over-consumption  - Identify factors contributing to increased intake, treat as appropriate  - Monitor I&O, WT and lab values  - Obtain nutritional consult as needed  - Evaluate psychosocial factors contributing to over-consumption  Outcome: Adequate for Discharge     Problem: METABOLIC/FLUID AND ELECTROLYTES - ADULT  Goal: Electrolytes maintained within normal limits  Description: INTERVENTIONS:  - Monitor labs and rhythm and assess patient for signs and symptoms of electrolyte imbalances  - Administer electrolyte replacement as ordered  - Monitor response to electrolyte replacements, including rhythm and repeat lab results as appropriate  - Fluid restriction as ordered  - Instruct patient on fluid and nutrition restrictions as appropriate  Outcome: Adequate for Discharge   Alert to self , not communicating that much . Denies any pain . Abdomen soft , bs hypo . Not passing gas , incontinent of bladder and bowel . Needs help to feed . Abdominal incision dry and intact . Gab # 1 draining sero sanguineous drainage .  Plan of care discussed with family , will continue to monitor

## 2022-09-19 NOTE — CM/SW NOTE
Informed by RN that patient is medically cleared for discharge. Spoke with Karina Pena from [facility] who confirmed bed available today and requested rapid covid test. Spoke with patient's son José Miguel Hadley) who is agreeable with discharge. Spoke with [facility] ambulance and scheduled  for 2pm today. PCS form completed. Updated RN. SW will remain available.      [address]  Discharge Planner  [phone]

## 2022-09-19 NOTE — PROGRESS NOTES
Pt mostly sleeping this shift, easily arousable, gives monosyllabic answers to basic questions, say \"No\" to enquires about pain. Last Bm was 9/16, Senokot given, all PO  meds crushed and given w/ apple sauce. Pt closely for safe swallowing. monitored for aspiration prec, HOB elevated. Purewick in use for incontinence. Pt repositioned q2 hrs. BP borderline high, does not meet parameters for PRN BP meds. Sr on tele. Will cont to monitor.

## 2022-10-03 ENCOUNTER — APPOINTMENT (OUTPATIENT)
Dept: NUCLEAR MEDICINE | Facility: HOSPITAL | Age: 87
End: 2022-10-03
Attending: SURGERY
Payer: MEDICARE

## 2022-10-03 ENCOUNTER — OFFICE VISIT (OUTPATIENT)
Facility: LOCATION | Age: 87
End: 2022-10-03
Payer: MEDICARE

## 2022-10-03 ENCOUNTER — APPOINTMENT (OUTPATIENT)
Dept: CT IMAGING | Facility: HOSPITAL | Age: 87
End: 2022-10-03
Attending: EMERGENCY MEDICINE
Payer: MEDICARE

## 2022-10-03 ENCOUNTER — HOSPITAL ENCOUNTER (OUTPATIENT)
Facility: HOSPITAL | Age: 87
Setting detail: OBSERVATION
Discharge: NURSING, REHAB OR LONG-TERM CARE | End: 2022-10-04
Attending: EMERGENCY MEDICINE | Admitting: STUDENT IN AN ORGANIZED HEALTH CARE EDUCATION/TRAINING PROGRAM
Payer: MEDICARE

## 2022-10-03 VITALS — HEART RATE: 98 BPM | TEMPERATURE: 97 F

## 2022-10-03 DIAGNOSIS — K83.8 POSTOPERATIVE BILE LEAK: Primary | ICD-10-CM

## 2022-10-03 DIAGNOSIS — Z90.49 S/P LAPAROSCOPIC CHOLECYSTECTOMY: Primary | ICD-10-CM

## 2022-10-03 DIAGNOSIS — K91.89 POSTOPERATIVE BILE LEAK: ICD-10-CM

## 2022-10-03 DIAGNOSIS — R93.5 ABNORMAL CT OF THE ABDOMEN: ICD-10-CM

## 2022-10-03 DIAGNOSIS — K83.8 POSTOPERATIVE BILE LEAK: ICD-10-CM

## 2022-10-03 DIAGNOSIS — K91.89 POSTOPERATIVE BILE LEAK: Primary | ICD-10-CM

## 2022-10-03 PROBLEM — I10 BENIGN ESSENTIAL HTN: Status: ACTIVE | Noted: 2018-08-24

## 2022-10-03 LAB
ALBUMIN SERPL-MCNC: 2.8 G/DL (ref 3.4–5)
ALBUMIN/GLOB SERPL: 0.5 {RATIO} (ref 1–2)
ALP LIVER SERPL-CCNC: 104 U/L
ALT SERPL-CCNC: <6 U/L
ANION GAP SERPL CALC-SCNC: 5 MMOL/L (ref 0–18)
AST SERPL-CCNC: 12 U/L (ref 15–37)
BASOPHILS # BLD AUTO: 0.04 X10(3) UL (ref 0–0.2)
BASOPHILS NFR BLD AUTO: 0.5 %
BILIRUB SERPL-MCNC: 0.3 MG/DL (ref 0.1–2)
BUN BLD-MCNC: 13 MG/DL (ref 7–18)
CALCIUM BLD-MCNC: 9.2 MG/DL (ref 8.5–10.1)
CHLORIDE SERPL-SCNC: 99 MMOL/L (ref 98–112)
CO2 SERPL-SCNC: 28 MMOL/L (ref 21–32)
CREAT BLD-MCNC: 0.69 MG/DL
EOSINOPHIL # BLD AUTO: 0.23 X10(3) UL (ref 0–0.7)
EOSINOPHIL NFR BLD AUTO: 2.8 %
ERYTHROCYTE [DISTWIDTH] IN BLOOD BY AUTOMATED COUNT: 13.9 %
GFR SERPLBLD BASED ON 1.73 SQ M-ARVRAT: 85 ML/MIN/1.73M2 (ref 60–?)
GLOBULIN PLAS-MCNC: 5.3 G/DL (ref 2.8–4.4)
GLUCOSE BLD-MCNC: 120 MG/DL (ref 70–99)
HCT VFR BLD AUTO: 40.3 %
HGB BLD-MCNC: 12.8 G/DL
IMM GRANULOCYTES # BLD AUTO: 0.04 X10(3) UL (ref 0–1)
IMM GRANULOCYTES NFR BLD: 0.5 %
LACTATE SERPL-SCNC: 1.5 MMOL/L (ref 0.4–2)
LIPASE SERPL-CCNC: 116 U/L (ref 73–393)
LYMPHOCYTES # BLD AUTO: 1.87 X10(3) UL (ref 1–4)
LYMPHOCYTES NFR BLD AUTO: 22.7 %
MCH RBC QN AUTO: 30.2 PG (ref 26–34)
MCHC RBC AUTO-ENTMCNC: 31.8 G/DL (ref 31–37)
MCV RBC AUTO: 95 FL
MONOCYTES # BLD AUTO: 0.51 X10(3) UL (ref 0.1–1)
MONOCYTES NFR BLD AUTO: 6.2 %
NEUTROPHILS # BLD AUTO: 5.53 X10 (3) UL (ref 1.5–7.7)
NEUTROPHILS # BLD AUTO: 5.53 X10(3) UL (ref 1.5–7.7)
NEUTROPHILS NFR BLD AUTO: 67.3 %
OSMOLALITY SERPL CALC.SUM OF ELEC: 275 MOSM/KG (ref 275–295)
PLATELET # BLD AUTO: 354 10(3)UL (ref 150–450)
POTASSIUM SERPL-SCNC: 4.1 MMOL/L (ref 3.5–5.1)
PROCALCITONIN SERPL-MCNC: <0.05 NG/ML (ref ?–0.16)
PROT SERPL-MCNC: 8.1 G/DL (ref 6.4–8.2)
RBC # BLD AUTO: 4.24 X10(6)UL
SARS-COV-2 RNA RESP QL NAA+PROBE: NOT DETECTED
SODIUM SERPL-SCNC: 132 MMOL/L (ref 136–145)
WBC # BLD AUTO: 8.2 X10(3) UL (ref 4–11)

## 2022-10-03 PROCEDURE — 99213 OFFICE O/P EST LOW 20 MIN: CPT | Performed by: STUDENT IN AN ORGANIZED HEALTH CARE EDUCATION/TRAINING PROGRAM

## 2022-10-03 PROCEDURE — 1111F DSCHRG MED/CURRENT MED MERGE: CPT | Performed by: STUDENT IN AN ORGANIZED HEALTH CARE EDUCATION/TRAINING PROGRAM

## 2022-10-03 PROCEDURE — 78830 RP LOCLZJ TUM SPECT W/CT 1: CPT | Performed by: SURGERY

## 2022-10-03 PROCEDURE — 74177 CT ABD & PELVIS W/CONTRAST: CPT | Performed by: EMERGENCY MEDICINE

## 2022-10-03 PROCEDURE — 99220 INITIAL OBSERVATION CARE,LEVL III: CPT | Performed by: INTERNAL MEDICINE

## 2022-10-03 PROCEDURE — 78226 HEPATOBILIARY SYSTEM IMAGING: CPT | Performed by: SURGERY

## 2022-10-03 RX ORDER — SODIUM CHLORIDE 9 MG/ML
INJECTION, SOLUTION INTRAVENOUS CONTINUOUS
Status: DISCONTINUED | OUTPATIENT
Start: 2022-10-03 | End: 2022-10-04

## 2022-10-03 RX ORDER — FAMOTIDINE 20 MG/1
20 TABLET, FILM COATED ORAL 2 TIMES DAILY
Status: DISCONTINUED | OUTPATIENT
Start: 2022-10-03 | End: 2022-10-04

## 2022-10-03 RX ORDER — ACETAMINOPHEN 500 MG
500 TABLET ORAL EVERY 4 HOURS PRN
Status: DISCONTINUED | OUTPATIENT
Start: 2022-10-03 | End: 2022-10-04

## 2022-10-03 RX ORDER — AMLODIPINE BESYLATE 5 MG/1
5 TABLET ORAL DAILY
Status: DISCONTINUED | OUTPATIENT
Start: 2022-10-03 | End: 2022-10-04

## 2022-10-03 RX ORDER — SODIUM CHLORIDE 9 MG/ML
125 INJECTION, SOLUTION INTRAVENOUS CONTINUOUS
Status: DISCONTINUED | OUTPATIENT
Start: 2022-10-03 | End: 2022-10-04

## 2022-10-03 RX ORDER — HEPARIN SODIUM 5000 [USP'U]/ML
5000 INJECTION, SOLUTION INTRAVENOUS; SUBCUTANEOUS EVERY 8 HOURS SCHEDULED
Status: DISCONTINUED | OUTPATIENT
Start: 2022-10-03 | End: 2022-10-04

## 2022-10-03 RX ORDER — METOPROLOL SUCCINATE 50 MG/1
50 TABLET, EXTENDED RELEASE ORAL
Status: DISCONTINUED | OUTPATIENT
Start: 2022-10-04 | End: 2022-10-04

## 2022-10-03 RX ORDER — SENNOSIDES 8.6 MG
17.2 TABLET ORAL 2 TIMES DAILY PRN
Status: DISCONTINUED | OUTPATIENT
Start: 2022-10-03 | End: 2022-10-04

## 2022-10-03 RX ORDER — ONDANSETRON 2 MG/ML
4 INJECTION INTRAMUSCULAR; INTRAVENOUS EVERY 6 HOURS PRN
Status: DISCONTINUED | OUTPATIENT
Start: 2022-10-03 | End: 2022-10-04

## 2022-10-03 RX ORDER — DONEPEZIL HYDROCHLORIDE 5 MG/1
5 TABLET, FILM COATED ORAL NIGHTLY
Status: DISCONTINUED | OUTPATIENT
Start: 2022-10-03 | End: 2022-10-04

## 2022-10-03 RX ORDER — ESCITALOPRAM OXALATE 10 MG/1
10 TABLET ORAL DAILY
Status: DISCONTINUED | OUTPATIENT
Start: 2022-10-03 | End: 2022-10-04

## 2022-10-03 RX ORDER — METOCLOPRAMIDE HYDROCHLORIDE 5 MG/ML
10 INJECTION INTRAMUSCULAR; INTRAVENOUS EVERY 8 HOURS PRN
Status: DISCONTINUED | OUTPATIENT
Start: 2022-10-03 | End: 2022-10-04

## 2022-10-03 NOTE — ED INITIAL ASSESSMENT (HPI)
Pt had gall bladder removed on 9/16 - difficult surgery Yana Moscoso); pt @ clinic today for bile leak in drain. Pt coming to ED for stat hydroscan and labs - POAs in [facility] and Pt speaks Sarahi. Pt to arrive with RN from her facility.

## 2022-10-03 NOTE — ED QUICK NOTES
NM contacted regarding HIDA scan. NM will order a dose and take patient from either the floor or ER whenever scan is ready.

## 2022-10-03 NOTE — ED QUICK NOTES
Orders for admission, patient is aware of plan and ready to go upstairs. Any questions, please call ED RN Dejuan All at extension [phone]. Vaccinated?  Overdue for booster  Type of COVID test sent: Rapid  COVID Suspicion level: Low      Titratable drug(s) infusing:  Rate:    LOC at time of transport: Alert; speaks Sarahi    Other pertinent information:    CIWA score=  NIH score=

## 2022-10-04 VITALS
SYSTOLIC BLOOD PRESSURE: 132 MMHG | BODY MASS INDEX: 23 KG/M2 | WEIGHT: 140 LBS | OXYGEN SATURATION: 95 % | RESPIRATION RATE: 18 BRPM | HEART RATE: 88 BPM | DIASTOLIC BLOOD PRESSURE: 60 MMHG | TEMPERATURE: 98 F

## 2022-10-04 LAB
ALBUMIN SERPL-MCNC: 2.4 G/DL (ref 3.4–5)
ALBUMIN/GLOB SERPL: 0.6 {RATIO} (ref 1–2)
ALP LIVER SERPL-CCNC: 77 U/L
ALT SERPL-CCNC: 8 U/L
ANION GAP SERPL CALC-SCNC: 6 MMOL/L (ref 0–18)
AST SERPL-CCNC: 7 U/L (ref 15–37)
BASOPHILS # BLD AUTO: 0.03 X10(3) UL (ref 0–0.2)
BASOPHILS NFR BLD AUTO: 0.4 %
BILIRUB SERPL-MCNC: 0.3 MG/DL (ref 0.1–2)
BUN BLD-MCNC: 8 MG/DL (ref 7–18)
CALCIUM BLD-MCNC: 8.8 MG/DL (ref 8.5–10.1)
CHLORIDE SERPL-SCNC: 105 MMOL/L (ref 98–112)
CO2 SERPL-SCNC: 25 MMOL/L (ref 21–32)
CREAT BLD-MCNC: 0.43 MG/DL
EOSINOPHIL # BLD AUTO: 0.28 X10(3) UL (ref 0–0.7)
EOSINOPHIL NFR BLD AUTO: 4 %
ERYTHROCYTE [DISTWIDTH] IN BLOOD BY AUTOMATED COUNT: 13.9 %
GFR SERPLBLD BASED ON 1.73 SQ M-ARVRAT: 95 ML/MIN/1.73M2 (ref 60–?)
GLOBULIN PLAS-MCNC: 4.1 G/DL (ref 2.8–4.4)
GLUCOSE BLD-MCNC: 94 MG/DL (ref 70–99)
HCT VFR BLD AUTO: 33.6 %
HGB BLD-MCNC: 10.9 G/DL
IMM GRANULOCYTES # BLD AUTO: 0.02 X10(3) UL (ref 0–1)
IMM GRANULOCYTES NFR BLD: 0.3 %
LYMPHOCYTES # BLD AUTO: 1.85 X10(3) UL (ref 1–4)
LYMPHOCYTES NFR BLD AUTO: 26.7 %
MCH RBC QN AUTO: 29.9 PG (ref 26–34)
MCHC RBC AUTO-ENTMCNC: 32.4 G/DL (ref 31–37)
MCV RBC AUTO: 92.3 FL
MONOCYTES # BLD AUTO: 0.47 X10(3) UL (ref 0.1–1)
MONOCYTES NFR BLD AUTO: 6.8 %
NEUTROPHILS # BLD AUTO: 4.28 X10 (3) UL (ref 1.5–7.7)
NEUTROPHILS # BLD AUTO: 4.28 X10(3) UL (ref 1.5–7.7)
NEUTROPHILS NFR BLD AUTO: 61.8 %
OSMOLALITY SERPL CALC.SUM OF ELEC: 280 MOSM/KG (ref 275–295)
PLATELET # BLD AUTO: 256 10(3)UL (ref 150–450)
POTASSIUM SERPL-SCNC: 3.8 MMOL/L (ref 3.5–5.1)
PROT SERPL-MCNC: 6.5 G/DL (ref 6.4–8.2)
RBC # BLD AUTO: 3.64 X10(6)UL
SODIUM SERPL-SCNC: 136 MMOL/L (ref 136–145)
WBC # BLD AUTO: 6.9 X10(3) UL (ref 4–11)

## 2022-10-04 PROCEDURE — 99217 OBSERVATION CARE DISCHARGE: CPT | Performed by: HOSPITALIST

## 2022-10-04 RX ORDER — POTASSIUM CHLORIDE 14.9 MG/ML
20 INJECTION INTRAVENOUS ONCE
Status: COMPLETED | OUTPATIENT
Start: 2022-10-04 | End: 2022-10-04

## 2022-10-04 NOTE — PROGRESS NOTES
NURSING DISCHARGE NOTE    Discharged Nursing home via Ambulance. Accompanied by Family member  Belongings Taken by patient/family     Patient left unit at [phone] via EMS. IV discontinued. Report given to Liz. Son updated on patient status and plan of care.

## 2022-10-04 NOTE — PLAN OF CARE
NURSING ADMISSION NOTE      Patient admitted via Cart, accompanied by transport staff  Oriented to room. Safety precautions initiated. Bed in low position. Call light in reach. Pt is lethargic, opens eyes but does not follow commands. Rt JOSE noted from 9/16/22 lap kalie with green drainage in tubing and dark brown drainage in bulb. Sutures intact steristrips noted to lower mid abd lap site. Pt NPO. Incontinent of bowel and bladder, soft small BM noted. Rt arm contracture noted. Pt SR on tele, R.A w/ adequate O2 sats. Will cont to monitor. Problem: Patient/Family Goals  Goal: Patient/Family Long Term Goal  Description: Patient's Long Term Goal: DC home    Interventions:  - monitor  vs, evaluate drain  - See additional Care Plan goals for specific interventions  Outcome: Progressing  Goal: Patient/Family Short Term Goal  Description: Patient's Short Term Goal:  DC     Interventions:   - safety prec, tele,VSS, SCD  - See additional Care Plan goals for specific interventions  Outcome: Progressing     Problem: CARDIOVASCULAR - ADULT  Goal: Maintains optimal cardiac output and hemodynamic stability  Description: INTERVENTIONS:  - Monitor vital signs, rhythm, and trends  - Monitor for bleeding, hypotension and signs of decreased cardiac output  - Evaluate effectiveness of vasoactive medications to optimize hemodynamic stability  - Monitor arterial and/or venous puncture sites for bleeding and/or hematoma  - Assess quality of pulses, skin color and temperature  - Assess for signs of decreased coronary artery perfusion - ex.  Angina  - Evaluate fluid balance, assess for edema, trend weights  Outcome: Progressing  Goal: Absence of cardiac arrhythmias or at baseline  Description: INTERVENTIONS:  - Continuous cardiac monitoring, monitor vital signs, obtain 12 lead EKG if indicated  - Evaluate effectiveness of antiarrhythmic and heart rate control medications as ordered  - Initiate emergency measures for life threatening arrhythmias  - Monitor electrolytes and administer replacement therapy as ordered  Outcome: Progressing     Problem: RESPIRATORY - ADULT  Goal: Achieves optimal ventilation and oxygenation  Description: INTERVENTIONS:  - Assess for changes in respiratory status  - Assess for changes in mentation and behavior  - Position to facilitate oxygenation and minimize respiratory effort  - Oxygen supplementation based on oxygen saturation or ABGs  - Provide Smoking Cessation handout, if applicable  - Encourage broncho-pulmonary hygiene including cough, deep breathe, Incentive Spirometry  - Assess the need for suctioning and perform as needed  - Assess and instruct to report SOB or any respiratory difficulty  - Respiratory Therapy support as indicated  - Manage/alleviate anxiety  - Monitor for signs/symptoms of CO2 retention  Outcome: Progressing     Problem: GASTROINTESTINAL - ADULT  Goal: Minimal or absence of nausea and vomiting  Description: INTERVENTIONS:  - Maintain adequate hydration with IV or PO as ordered and tolerated  - Nasogastric tube to low intermittent suction as ordered  - Evaluate effectiveness of ordered antiemetic medications  - Provide nonpharmacologic comfort measures as appropriate  - Advance diet as tolerated, if ordered  - Obtain nutritional consult as needed  - Evaluate fluid balance  Outcome: Progressing  Goal: Maintains or returns to baseline bowel function  Description: INTERVENTIONS:  - Assess bowel function  - Maintain adequate hydration with IV or PO as ordered and tolerated  - Evaluate effectiveness of GI medications  - Encourage mobilization and activity  - Obtain nutritional consult as needed  - Establish a toileting routine/schedule  - Consider collaborating with pharmacy to review patient's medication profile  Outcome: Progressing     Problem: SKIN/TISSUE INTEGRITY - ADULT  Goal: Skin integrity remains intact  Description: INTERVENTIONS  - Assess and document risk factors for pressure ulcer development  - Assess and document skin integrity  - Monitor for areas of redness and/or skin breakdown  - Initiate interventions, skin care algorithm/standards of care as needed  Outcome: Progressing  Goal: Incision(s), wounds(s) or drain site(s) healing without S/S of infection  Description: INTERVENTIONS:  - Assess and document risk factors for pressure ulcer development  - Assess and document skin integrity  - Assess and document dressing/incision, wound bed, drain sites and surrounding tissue  - Implement wound care per orders  - Initiate isolation precautions as appropriate  - Initiate Pressure Ulcer prevention bundle as indicated  Outcome: Progressing     Problem: MUSCULOSKELETAL - ADULT  Goal: Return mobility to safest level of function  Description: INTERVENTIONS:  - Assess patient stability and activity tolerance for standing, transferring and ambulating w/ or w/o assistive devices  - Assist with transfers and ambulation using safe patient handling equipment as needed  - Ensure adequate protection for wounds/incisions during mobilization  - Obtain PT/OT consults as needed  - Advance activity as appropriate  - Communicate ordered activity level and limitations with patient/family  Outcome: Progressing  Goal: Maintain proper alignment of affected body part  Description: INTERVENTIONS:  - Support and protect limb and body alignment per provider's orders  - Instruct and reinforce with patient and family use of appropriate assistive device and precautions (e.g. spinal or hip dislocation precautions)  Outcome: Progressing     Problem: Impaired Functional Mobility  Goal: Achieve highest/safest level of mobility/gait  Description: Interventions:  - Assess patient's functional ability and stability  - Promote increasing activity/tolerance for mobility and gait  - Educate and engage patient/family in tolerated activity level and precautions    Outcome: Progressing     Problem: Impaired Swallowing  Goal: Minimize aspiration risk  Description: Interventions:  - Patient should be alert and upright for all feedings (90 degrees preferred)  - Offer food and liquids at a slow rate  - No straws  - Encourage small bites of food and small sips of liquid  - Offer pills one at a time, crush or deliver with applesauce as needed  - Discontinue feeding and notify MD (or speech pathologist) if coughing or persistent throat clearing or wet/gurgly vocal quality is noted  Outcome: Progressing     Problem: Impaired Communication  Goal: Patient will achieve maximal communication potential  Description: Interventions:    Outcome: Progressing

## 2022-10-04 NOTE — PLAN OF CARE
Report given to Sam Dorantes at [place]. All questions and concerns addressed. Patient to be discharged via [facility].

## 2022-10-04 NOTE — CM/SW NOTE
10/04/22 1336   Discharge disposition   Expected discharge disposition [address] Provider   ([facility])   Discharge transportation [facility]     Notified by RN pt is medically cleared for discharge. DANIEL spoke to Aries at [facility] who confirmed they are able to accept pt today. [facility] arranged for 5pm. Updated RN and Aries at [place]. DANIEL called pt's son to notify him of transport time with no answer, left VM. PCS form completed and available for RN to print. [facility]    TENA Menezes  Discharge Planner     Addendum: DANIEL received call back from pt's son. DANIEL informed pt's son ambulance is arranged for 5pm, pt's son in agreement with discharge plan.

## 2022-10-04 NOTE — CM/SW NOTE
10/04/22 1000   CM/SW Referral Data   Referral Source Social Work (self-referral)   Reason for Referral Discharge planning   Informant Son;EMR;Clinical Staff Member   Pertinent Medical Hx   Does patient have an established PCP? Yes   Patient [address]   Post Acute Care Provider Upon Admission   Saeed Lawson)   Patient lives with Alone   Patient Status Prior to Admission   Independent with ADLs and Mobility No   Pt. requires assistance with Housework;Driving;Meals; Bathing; Ambulating;Medications; Toileting;Finances; Feeding;Dressing   Discharge Needs   Anticipated D/C needs Long term care facility;Transportation services     Pt is an 82year old female admitted for postoperative bile leak s/p lap kalie on 9/16/22. Chart reviewed and pt noted to be from [facility]. SW spoke with pt's son Queenie Looney who confirmed pt is a LTC resident at [facility]. Return referral sent to [address] [place] [address] in [place]. SW will continue to follow.      TENA Vasquez  Discharge Planner

## 2022-10-04 NOTE — OCCUPATIONAL THERAPY NOTE
Per Chart Review, patient is dependent at baseline. Uses Total lift, is non ambulatory and is dependent for most ADLs. OT will sign off as no skilled OT needed.

## 2022-10-04 NOTE — PROGRESS NOTES
The patient was seen and evaluated by general surgery. Full consult note to follow. 82year old female who is 2 weeks status post laparoscopic cholecystectomy presents to the emergency department with concerns of bile leak. The patient was evaluated in our office this afternoon for post-operative visit. Her JOSE drain was concerning for possible bile. She was instructed to present to the ED for further workup. CT indicates small fluid collection in gallbladder fossa. 1. The patient will be admitted for HIDA scan to be completed tomorrow for evaluation of possible bile leak. 2. NPO  3. Tylenol for pain management. 4. Hospiatlist on consult  5.  Further recommendations based on HIDA scan findings         Leslie Bright PA-C  10/3/2022  7:26 PM

## 2022-10-04 NOTE — PLAN OF CARE
Problem: Patient/Family Goals  Goal: Patient/Family Long Term Goal  Description: Patient's Long Term Goal: Discharge back to [facility]    Interventions:  - Pain control  - Clear scan  - See additional Care Plan goals for specific interventions  Outcome: Progressing  Goal: Patient/Family Short Term Goal  Description: Patient's Short Term Goal: Free from fall    Interventions:   - Bed alarm  - hourly rounding  - See additional Care Plan goals for specific interventions  Outcome: Progressing     Problem: CARDIOVASCULAR - ADULT  Goal: Maintains optimal cardiac output and hemodynamic stability  Description: INTERVENTIONS:  - Monitor vital signs, rhythm, and trends  - Monitor for bleeding, hypotension and signs of decreased cardiac output  - Evaluate effectiveness of vasoactive medications to optimize hemodynamic stability  - Monitor arterial and/or venous puncture sites for bleeding and/or hematoma  - Assess quality of pulses, skin color and temperature  - Assess for signs of decreased coronary artery perfusion - ex.  Angina  - Evaluate fluid balance, assess for edema, trend weights  Outcome: Progressing  Goal: Absence of cardiac arrhythmias or at baseline  Description: INTERVENTIONS:  - Continuous cardiac monitoring, monitor vital signs, obtain 12 lead EKG if indicated  - Evaluate effectiveness of antiarrhythmic and heart rate control medications as ordered  - Initiate emergency measures for life threatening arrhythmias  - Monitor electrolytes and administer replacement therapy as ordered  Outcome: Progressing     Problem: RESPIRATORY - ADULT  Goal: Achieves optimal ventilation and oxygenation  Description: INTERVENTIONS:  - Assess for changes in respiratory status  - Assess for changes in mentation and behavior  - Position to facilitate oxygenation and minimize respiratory effort  - Oxygen supplementation based on oxygen saturation or ABGs  - Provide Smoking Cessation handout, if applicable  - Encourage broncho-pulmonary hygiene including cough, deep breathe, Incentive Spirometry  - Assess the need for suctioning and perform as needed  - Assess and instruct to report SOB or any respiratory difficulty  - Respiratory Therapy support as indicated  - Manage/alleviate anxiety  - Monitor for signs/symptoms of CO2 retention  Outcome: Progressing     Problem: GASTROINTESTINAL - ADULT  Goal: Minimal or absence of nausea and vomiting  Description: INTERVENTIONS:  - Maintain adequate hydration with IV or PO as ordered and tolerated  - Nasogastric tube to low intermittent suction as ordered  - Evaluate effectiveness of ordered antiemetic medications  - Provide nonpharmacologic comfort measures as appropriate  - Advance diet as tolerated, if ordered  - Obtain nutritional consult as needed  - Evaluate fluid balance  Outcome: Progressing  Goal: Maintains or returns to baseline bowel function  Description: INTERVENTIONS:  - Assess bowel function  - Maintain adequate hydration with IV or PO as ordered and tolerated  - Evaluate effectiveness of GI medications  - Encourage mobilization and activity  - Obtain nutritional consult as needed  - Establish a toileting routine/schedule  - Consider collaborating with pharmacy to review patient's medication profile  Outcome: Progressing     Problem: SKIN/TISSUE INTEGRITY - ADULT  Goal: Skin integrity remains intact  Description: INTERVENTIONS  - Assess and document risk factors for pressure ulcer development  - Assess and document skin integrity  - Monitor for areas of redness and/or skin breakdown  - Initiate interventions, skin care algorithm/standards of care as needed  Outcome: Progressing  Goal: Incision(s), wounds(s) or drain site(s) healing without S/S of infection  Description: INTERVENTIONS:  - Assess and document risk factors for pressure ulcer development  - Assess and document skin integrity  - Assess and document dressing/incision, wound bed, drain sites and surrounding tissue  - Implement wound care per orders  - Initiate isolation precautions as appropriate  - Initiate Pressure Ulcer prevention bundle as indicated  Outcome: Progressing     Problem: MUSCULOSKELETAL - ADULT  Goal: Return mobility to safest level of function  Description: INTERVENTIONS:  - Assess patient stability and activity tolerance for standing, transferring and ambulating w/ or w/o assistive devices  - Assist with transfers and ambulation using safe patient handling equipment as needed  - Ensure adequate protection for wounds/incisions during mobilization  - Obtain PT/OT consults as needed  - Advance activity as appropriate  - Communicate ordered activity level and limitations with patient/family  Outcome: Progressing  Goal: Maintain proper alignment of affected body part  Description: INTERVENTIONS:  - Support and protect limb and body alignment per provider's orders  - Instruct and reinforce with patient and family use of appropriate assistive device and precautions (e.g. spinal or hip dislocation precautions)  Outcome: Progressing     Problem: Impaired Functional Mobility  Goal: Achieve highest/safest level of mobility/gait  Description: Interventions:  - Assess patient's functional ability and stability  - Promote increasing activity/tolerance for mobility and gait  - Educate and engage patient/family in tolerated activity level and precautions  - Recommend use of total lift for transfers  Outcome: Progressing     Problem: Impaired Swallowing  Goal: Minimize aspiration risk  Description: Interventions:  - Patient should be alert and upright for all feedings (90 degrees preferred)  - Offer food and liquids at a slow rate  - No straws  - Encourage small bites of food and small sips of liquid  - Offer pills one at a time, crush or deliver with applesauce as needed  - Discontinue feeding and notify MD (or speech pathologist) if coughing or persistent throat clearing or wet/gurgly vocal quality is noted  Outcome: Progressing Problem: Impaired Communication  Goal: Patient will achieve maximal communication potential  Description: Interventions:  - Ask \"yes/no\" questions to facilitate patient's ability to communicate wants and needs  Outcome: Progressing

## 2022-10-04 NOTE — PLAN OF CARE
Patients diet from previous admission pureed with thin liquids - 1:1 feed. Will start diet and plan for discharge this afternoon to [facility].

## 2022-10-04 NOTE — SLP NOTE
Order received, chart reviewed. Patient unable to awaken sufficiently for participation in swallow evaluation. Defer eval for now. D/W RN. SLP to follow-up at another time, if/when patient able to participate.    Justin Casiano MS CCC-SLP/L, pager [phone]  Speech-LanguagePathologist

## 2022-10-04 NOTE — PHYSICAL THERAPY NOTE
Order received for PT eval and chart reviewed. Pt is dependent at baseline. Pt uses total lift for OOB mobility. No skilled PT warranted at this time. PT will sign off.

## 2022-10-21 ENCOUNTER — HOSPITAL ENCOUNTER (EMERGENCY)
Facility: HOSPITAL | Age: 87
Discharge: HOME | End: 2022-10-21
Attending: EMERGENCY MEDICINE
Payer: MEDICARE

## 2022-10-21 ENCOUNTER — OFFICE VISIT (OUTPATIENT)
Facility: LOCATION | Age: 87
End: 2022-10-21

## 2022-10-21 ENCOUNTER — HOSPITAL ENCOUNTER (OUTPATIENT)
Dept: NUCLEAR MEDICINE | Facility: HOSPITAL | Age: 87
Discharge: HOME | End: 2022-10-21
Attending: SURGERY
Payer: MEDICARE

## 2022-10-21 VITALS
HEART RATE: 81 BPM | RESPIRATION RATE: 23 BRPM | TEMPERATURE: 98 F | DIASTOLIC BLOOD PRESSURE: 65 MMHG | OXYGEN SATURATION: 100 % | SYSTOLIC BLOOD PRESSURE: 128 MMHG

## 2022-10-21 VITALS — TEMPERATURE: 98 F | HEART RATE: 91 BPM

## 2022-10-21 DIAGNOSIS — Z90.49 STATUS POST LAPAROSCOPIC CHOLECYSTECTOMY: ICD-10-CM

## 2022-10-21 DIAGNOSIS — Z90.49 STATUS POST LAPAROSCOPIC CHOLECYSTECTOMY: Primary | ICD-10-CM

## 2022-10-21 DIAGNOSIS — R10.9 ABDOMINAL PAIN, UNSPECIFIED ABDOMINAL LOCATION: Primary | ICD-10-CM

## 2022-10-21 LAB
ALBUMIN SERPL-MCNC: 2.8 G/DL (ref 3.4–5)
ALBUMIN/GLOB SERPL: 0.6 {RATIO} (ref 1–2)
ALP LIVER SERPL-CCNC: 86 U/L
ALT SERPL-CCNC: <6 U/L
ANION GAP SERPL CALC-SCNC: 7 MMOL/L (ref 0–18)
AST SERPL-CCNC: 16 U/L (ref 15–37)
BASOPHILS # BLD AUTO: 0.04 X10(3) UL (ref 0–0.2)
BASOPHILS NFR BLD AUTO: 0.4 %
BILIRUB SERPL-MCNC: 0.4 MG/DL (ref 0.1–2)
BILIRUB UR QL STRIP.AUTO: NEGATIVE
BUN BLD-MCNC: 16 MG/DL (ref 7–18)
CALCIUM BLD-MCNC: 9.2 MG/DL (ref 8.5–10.1)
CHLORIDE SERPL-SCNC: 100 MMOL/L (ref 98–112)
CLARITY UR REFRACT.AUTO: CLEAR
CO2 SERPL-SCNC: 27 MMOL/L (ref 21–32)
COLOR UR AUTO: YELLOW
CREAT BLD-MCNC: 0.68 MG/DL
EOSINOPHIL # BLD AUTO: 0.11 X10(3) UL (ref 0–0.7)
EOSINOPHIL NFR BLD AUTO: 1.2 %
ERYTHROCYTE [DISTWIDTH] IN BLOOD BY AUTOMATED COUNT: 13.2 %
GFR SERPLBLD BASED ON 1.73 SQ M-ARVRAT: 85 ML/MIN/1.73M2 (ref 60–?)
GLOBULIN PLAS-MCNC: 5 G/DL (ref 2.8–4.4)
GLUCOSE BLD-MCNC: 107 MG/DL (ref 70–99)
GLUCOSE UR STRIP.AUTO-MCNC: NEGATIVE MG/DL
HCT VFR BLD AUTO: 39.1 %
HGB BLD-MCNC: 12.5 G/DL
IMM GRANULOCYTES # BLD AUTO: 0.05 X10(3) UL (ref 0–1)
IMM GRANULOCYTES NFR BLD: 0.6 %
LEUKOCYTE ESTERASE UR QL STRIP.AUTO: NEGATIVE
LIPASE SERPL-CCNC: 87 U/L (ref 73–393)
LYMPHOCYTES # BLD AUTO: 2.25 X10(3) UL (ref 1–4)
LYMPHOCYTES NFR BLD AUTO: 25.2 %
MCH RBC QN AUTO: 30 PG (ref 26–34)
MCHC RBC AUTO-ENTMCNC: 32 G/DL (ref 31–37)
MCV RBC AUTO: 93.8 FL
MONOCYTES # BLD AUTO: 0.64 X10(3) UL (ref 0.1–1)
MONOCYTES NFR BLD AUTO: 7.2 %
NEUTROPHILS # BLD AUTO: 5.84 X10 (3) UL (ref 1.5–7.7)
NEUTROPHILS # BLD AUTO: 5.84 X10(3) UL (ref 1.5–7.7)
NEUTROPHILS NFR BLD AUTO: 65.4 %
NITRITE UR QL STRIP.AUTO: NEGATIVE
OSMOLALITY SERPL CALC.SUM OF ELEC: 280 MOSM/KG (ref 275–295)
PH UR STRIP.AUTO: 5 [PH] (ref 5–8)
PLATELET # BLD AUTO: 348 10(3)UL (ref 150–450)
POTASSIUM SERPL-SCNC: 4.2 MMOL/L (ref 3.5–5.1)
PROT SERPL-MCNC: 7.8 G/DL (ref 6.4–8.2)
PROT UR STRIP.AUTO-MCNC: NEGATIVE MG/DL
RBC # BLD AUTO: 4.17 X10(6)UL
SARS-COV-2 RNA RESP QL NAA+PROBE: NOT DETECTED
SODIUM SERPL-SCNC: 134 MMOL/L (ref 136–145)
SP GR UR STRIP.AUTO: 1.02 (ref 1–1.03)
UROBILINOGEN UR STRIP.AUTO-MCNC: <2 MG/DL
WBC # BLD AUTO: 8.9 X10(3) UL (ref 4–11)

## 2022-10-21 PROCEDURE — 78226 HEPATOBILIARY SYSTEM IMAGING: CPT | Performed by: SURGERY

## 2022-10-21 PROCEDURE — 78830 RP LOCLZJ TUM SPECT W/CT 1: CPT | Performed by: SURGERY

## 2022-10-21 PROCEDURE — 36415 COLL VENOUS BLD VENIPUNCTURE: CPT | Performed by: EMERGENCY MEDICINE

## 2022-10-21 PROCEDURE — 99284 EMERGENCY DEPT VISIT MOD MDM: CPT | Performed by: EMERGENCY MEDICINE

## 2022-10-21 PROCEDURE — 81001 URINALYSIS AUTO W/SCOPE: CPT | Performed by: EMERGENCY MEDICINE

## 2022-10-21 PROCEDURE — 83690 ASSAY OF LIPASE: CPT | Performed by: EMERGENCY MEDICINE

## 2022-10-21 PROCEDURE — 51701 INSERT BLADDER CATHETER: CPT | Performed by: EMERGENCY MEDICINE

## 2022-10-21 PROCEDURE — 99024 POSTOP FOLLOW-UP VISIT: CPT | Performed by: SURGERY

## 2022-10-21 PROCEDURE — 85025 COMPLETE CBC W/AUTO DIFF WBC: CPT | Performed by: EMERGENCY MEDICINE

## 2022-10-21 PROCEDURE — 80053 COMPREHEN METABOLIC PANEL: CPT | Performed by: EMERGENCY MEDICINE

## 2022-10-21 NOTE — ED QUICK NOTES
Urine cath performed. Noted to be incontinent of urine and stool. Brown stool noted. Ivette care performed, brief changed.

## 2022-10-21 NOTE — ED INITIAL ASSESSMENT (HPI)
Presents from [facility] via [facility] with c/o abdominal pain. Underwent a cholecystectomy a couple of weeks ago and had a tube placed. The tube was checked and continues to have drainage, which is maroon. Hx of dementia and is Danish speaking.

## 2022-10-21 NOTE — ED QUICK NOTES
Received a call from [facility] stating patient was supposed to go there as an outpatient. Call to [facility] to speak with nurse caring for patient, who said the patient was having abdominal pain so she sent the patient to the ER. Made aware that the patient was to come to the ER for an outpatient procedure and not to the ER. Response was why couldn't the ER just send the patient over to [facility]. After hanging up the phone with Fredy Sia, spoke with Dr. Braden Goldman, who said she saw the patient in the earlier and noted bilious drainage and wanted pt to undergo an outpatient HIDA scan. If the scan was negative, the patient was going to be sent home. Explained patient was mistakenly sent to the ER by the facility. Spoke with Hemalatha Lanier, clinical leader, regarding the situation. States this would be an EMTALA violation to send the patient to [facility] without an examination per emergency physician. Spoke with Dr. Sandra Smith about situation. Requests labs to be performed and patient to go to [facility].

## 2022-10-21 NOTE — PROGRESS NOTES
Patient was accidentally taken to the ED by [address] residence transport. Patient was scheduled for HIDA scan to exclude bile leak at 2 PM.  HIDA scan was negative for evidence of bile leak. Serology was performed and LFTs are within normal limits, albumin diminished to 2.8, CBC within normal limits, UA is negative for UTI  Dwayne drain was removed in the ED today. Case discussed with Dr. Anabel Mckenzie. Patient will be transported back to [address] [facility] tonight  No further follow-up necessary from general surgical standpoint.     Dyana Kocher MD, FACS

## 2022-11-07 ENCOUNTER — OFFICE VISIT (OUTPATIENT)
Facility: LOCATION | Age: 87
End: 2022-11-07
Payer: MEDICARE

## 2022-11-07 DIAGNOSIS — Z90.49 STATUS POST LAPAROSCOPIC CHOLECYSTECTOMY: Primary | ICD-10-CM

## 2023-04-15 ENCOUNTER — APPOINTMENT (OUTPATIENT)
Dept: GENERAL RADIOLOGY | Facility: HOSPITAL | Age: 88
End: 2023-04-15
Attending: EMERGENCY MEDICINE
Payer: MEDICARE

## 2023-04-15 ENCOUNTER — HOSPITAL ENCOUNTER (EMERGENCY)
Facility: HOSPITAL | Age: 88
Discharge: NURSING, REHAB OR LONG-TERM CARE | End: 2023-04-15
Attending: EMERGENCY MEDICINE
Payer: MEDICARE

## 2023-04-15 ENCOUNTER — APPOINTMENT (OUTPATIENT)
Dept: GENERAL RADIOLOGY | Facility: HOSPITAL | Age: 88
End: 2023-04-15
Payer: MEDICARE

## 2023-04-15 VITALS
HEART RATE: 80 BPM | BODY MASS INDEX: 24.8 KG/M2 | RESPIRATION RATE: 17 BRPM | WEIGHT: 140 LBS | TEMPERATURE: 100 F | OXYGEN SATURATION: 95 % | HEIGHT: 63 IN | SYSTOLIC BLOOD PRESSURE: 108 MMHG | DIASTOLIC BLOOD PRESSURE: 66 MMHG

## 2023-04-15 DIAGNOSIS — R50.9 FEVER, UNSPECIFIED FEVER CAUSE: Primary | ICD-10-CM

## 2023-04-15 LAB
ALBUMIN SERPL-MCNC: 3.3 G/DL (ref 3.4–5)
ALBUMIN/GLOB SERPL: 0.7 {RATIO} (ref 1–2)
ALP LIVER SERPL-CCNC: 81 U/L
ALT SERPL-CCNC: 6 U/L
ANION GAP SERPL CALC-SCNC: 3 MMOL/L (ref 0–18)
AST SERPL-CCNC: 14 U/L (ref 15–37)
BASOPHILS # BLD AUTO: 0.04 X10(3) UL (ref 0–0.2)
BASOPHILS NFR BLD AUTO: 0.5 %
BILIRUB SERPL-MCNC: 0.4 MG/DL (ref 0.1–2)
BILIRUB UR QL STRIP.AUTO: NEGATIVE
BUN BLD-MCNC: 26 MG/DL (ref 7–18)
CALCIUM BLD-MCNC: 8.6 MG/DL (ref 8.5–10.1)
CHLORIDE SERPL-SCNC: 118 MMOL/L (ref 98–112)
CO2 SERPL-SCNC: 25 MMOL/L (ref 21–32)
COLOR UR AUTO: YELLOW
CREAT BLD-MCNC: 0.9 MG/DL
EOSINOPHIL # BLD AUTO: 0.01 X10(3) UL (ref 0–0.7)
EOSINOPHIL NFR BLD AUTO: 0.1 %
ERYTHROCYTE [DISTWIDTH] IN BLOOD BY AUTOMATED COUNT: 15.9 %
GFR SERPLBLD BASED ON 1.73 SQ M-ARVRAT: 63 ML/MIN/1.73M2 (ref 60–?)
GLOBULIN PLAS-MCNC: 4.7 G/DL (ref 2.8–4.4)
GLUCOSE BLD-MCNC: 108 MG/DL (ref 70–99)
GLUCOSE UR STRIP.AUTO-MCNC: NEGATIVE MG/DL
HCT VFR BLD AUTO: 40.3 %
HGB BLD-MCNC: 12.4 G/DL
IMM GRANULOCYTES # BLD AUTO: 0.04 X10(3) UL (ref 0–1)
IMM GRANULOCYTES NFR BLD: 0.5 %
LACTATE SERPL-SCNC: 1.1 MMOL/L (ref 0.4–2)
LEUKOCYTE ESTERASE UR QL STRIP.AUTO: NEGATIVE
LYMPHOCYTES # BLD AUTO: 2.67 X10(3) UL (ref 1–4)
LYMPHOCYTES NFR BLD AUTO: 31.6 %
MCH RBC QN AUTO: 26.1 PG (ref 26–34)
MCHC RBC AUTO-ENTMCNC: 30.8 G/DL (ref 31–37)
MCV RBC AUTO: 84.7 FL
MONOCYTES # BLD AUTO: 0.6 X10(3) UL (ref 0.1–1)
MONOCYTES NFR BLD AUTO: 7.1 %
NEUTROPHILS # BLD AUTO: 5.1 X10 (3) UL (ref 1.5–7.7)
NEUTROPHILS # BLD AUTO: 5.1 X10(3) UL (ref 1.5–7.7)
NEUTROPHILS NFR BLD AUTO: 60.2 %
NITRITE UR QL STRIP.AUTO: NEGATIVE
OSMOLALITY SERPL CALC.SUM OF ELEC: 307 MOSM/KG (ref 275–295)
PH UR STRIP.AUTO: 5 [PH] (ref 5–8)
PLATELET # BLD AUTO: 266 10(3)UL (ref 150–450)
POTASSIUM SERPL-SCNC: 3.5 MMOL/L (ref 3.5–5.1)
PROT SERPL-MCNC: 8 G/DL (ref 6.4–8.2)
PROT UR STRIP.AUTO-MCNC: NEGATIVE MG/DL
RBC # BLD AUTO: 4.76 X10(6)UL
SARS-COV-2 RNA RESP QL NAA+PROBE: NOT DETECTED
SODIUM SERPL-SCNC: 146 MMOL/L (ref 136–145)
SP GR UR STRIP.AUTO: 1.02 (ref 1–1.03)
UROBILINOGEN UR STRIP.AUTO-MCNC: <2 MG/DL
WBC # BLD AUTO: 8.5 X10(3) UL (ref 4–11)

## 2023-04-15 PROCEDURE — 80053 COMPREHEN METABOLIC PANEL: CPT

## 2023-04-15 PROCEDURE — 87077 CULTURE AEROBIC IDENTIFY: CPT | Performed by: EMERGENCY MEDICINE

## 2023-04-15 PROCEDURE — 99285 EMERGENCY DEPT VISIT HI MDM: CPT

## 2023-04-15 PROCEDURE — 93010 ELECTROCARDIOGRAM REPORT: CPT

## 2023-04-15 PROCEDURE — 81001 URINALYSIS AUTO W/SCOPE: CPT

## 2023-04-15 PROCEDURE — 93005 ELECTROCARDIOGRAM TRACING: CPT

## 2023-04-15 PROCEDURE — 81001 URINALYSIS AUTO W/SCOPE: CPT | Performed by: EMERGENCY MEDICINE

## 2023-04-15 PROCEDURE — 80053 COMPREHEN METABOLIC PANEL: CPT | Performed by: EMERGENCY MEDICINE

## 2023-04-15 PROCEDURE — 99284 EMERGENCY DEPT VISIT MOD MDM: CPT

## 2023-04-15 PROCEDURE — 71045 X-RAY EXAM CHEST 1 VIEW: CPT | Performed by: EMERGENCY MEDICINE

## 2023-04-15 PROCEDURE — 87150 DNA/RNA AMPLIFIED PROBE: CPT | Performed by: EMERGENCY MEDICINE

## 2023-04-15 PROCEDURE — 87040 BLOOD CULTURE FOR BACTERIA: CPT | Performed by: EMERGENCY MEDICINE

## 2023-04-15 PROCEDURE — 85025 COMPLETE CBC W/AUTO DIFF WBC: CPT | Performed by: EMERGENCY MEDICINE

## 2023-04-15 PROCEDURE — 36415 COLL VENOUS BLD VENIPUNCTURE: CPT

## 2023-04-15 PROCEDURE — 83605 ASSAY OF LACTIC ACID: CPT | Performed by: EMERGENCY MEDICINE

## 2023-04-15 RX ORDER — POLYETHYLENE GLYCOL 3350 17 G/17G
17 POWDER, FOR SOLUTION ORAL DAILY
COMMUNITY

## 2023-04-15 RX ORDER — LORATADINE 10 MG/1
10 CAPSULE, LIQUID FILLED ORAL DAILY
COMMUNITY

## 2023-04-15 NOTE — ED INITIAL ASSESSMENT (HPI)
Pt presents to the ED via ems with complaints of temp of 101 and lethargy since 11am. Pt is normally nonverbal and has hx of dementia. Pt lying on cart with eyes open, skin hot and dry, resps unlabored.

## 2023-04-15 NOTE — DISCHARGE INSTRUCTIONS
Blood cultures and urine cultures are pending, there is no immediate cause of the patient's fever at this time. She is at her baseline neurologic standpoint. Physical exam is benign.   If she worsens return to the emergency department but at this time we will need to wait on culture results

## 2023-04-15 NOTE — ED QUICK NOTES
Report from [facility]. Patient has contractures, appears bed bound. Diapered.  100.2F rectal.

## 2023-04-16 LAB
ATRIAL RATE: 106 BPM
P AXIS: 53 DEGREES
P-R INTERVAL: 122 MS
Q-T INTERVAL: 328 MS
QRS DURATION: 64 MS
QTC CALCULATION (BEZET): 435 MS
R AXIS: 29 DEGREES
T AXIS: 37 DEGREES
VENTRICULAR RATE: 106 BPM

## 2023-04-20 NOTE — ED NOTES
Dr. Maria Esther Porras reviewed blood culture results. No action needed at this time. Patient does not need to return.

## 2024-04-18 ENCOUNTER — TELEPHONE (OUTPATIENT)
Dept: NEUROLOGY | Facility: CLINIC | Age: 89
End: 2024-04-18

## 2024-04-18 ENCOUNTER — OFFICE VISIT (OUTPATIENT)
Dept: NEUROLOGY | Facility: CLINIC | Age: 89
End: 2024-04-18
Payer: MEDICARE

## 2024-04-18 VITALS — HEART RATE: 91 BPM | SYSTOLIC BLOOD PRESSURE: 120 MMHG | RESPIRATION RATE: 16 BRPM | DIASTOLIC BLOOD PRESSURE: 68 MMHG

## 2024-04-18 DIAGNOSIS — R25.2 SPASTICITY: Primary | ICD-10-CM

## 2024-04-18 DIAGNOSIS — G20.A1 PARKINSON'S DISEASE WITHOUT DYSKINESIA OR FLUCTUATING MANIFESTATIONS (HCC): ICD-10-CM

## 2024-04-18 DIAGNOSIS — F02.C0 SEVERE DEMENTIA ASSOCIATED WITH OTHER UNDERLYING DISEASE, WITHOUT BEHAVIORAL DISTURBANCE, PSYCHOTIC DISTURBANCE, MOOD DISTURBANCE, OR ANXIETY (HCC): ICD-10-CM

## 2024-04-18 PROCEDURE — 99204 OFFICE O/P NEW MOD 45 MIN: CPT | Performed by: OTHER

## 2024-04-18 NOTE — PROGRESS NOTES
[facility] - [facility]   Neurology    Sultana Hess Patient Status:  No patient class for patient encounter    1935 MRN [id number]   Location 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 RN from assisted living cannot give more information, about medical condition. She has a history of parkinson's and at some point developed progressive upper and lower extremity weakness, which was presumed to be due to advanced parkinson's. She has a RUE contracture, LLE extensor contracture and limited to no movement in the RLE, stiffness in the LUE as well. Family is asking if she could get botox.     Pertinent imaging and laboratory work-up:   24 WBC 7.49, Hgb 13.0  A1c 5.9  Past Medical History:  Past Medical History:    Anemia    Cognitive communication deficit    COVID-19 virus infection    Dementia (HCC)    Depression    Dysphagia    Esophageal reflux    Essential hypertension    High blood pressure    Hyperlipidemia    Obesity    Osteoarthritis    Parkinson disease (HCC)    Prediabetes    Prediabetes    Unspecified fracture of shaft of humerus, right arm, sequela        Past Surgical History:  Past Surgical History:   Procedure Laterality Date    Knee replacement surgery Bilateral              Family History:  family history is not on file.  No history of PD    Social History:   reports that she has never smoked. She has never used smokeless tobacco. She reports that she does not drink alcohol and does not use drugs.    Allergies:  No Known Allergies    MEDICATIONS:    Current Outpatient Medications:     Loratadine 10 MG Oral Cap, Take 10 mg by mouth daily., Disp: , Rfl:      polyethylene glycol, PEG 3350, 17 g Oral Powd Pack, Take 17 g by mouth daily., Disp: , Rfl:     amLODIPine 5 MG Oral Tab, Take 1 tablet (5 mg total) by mouth daily., Disp: , Rfl: 0    acetaminophen 325 MG Oral Tab, Take 2 tablets (650 mg total) by mouth every 6 (six) hours as needed for Pain., Disp: , Rfl:     sennosides 8.6 MG Oral Tab, Take 2 tablets (17.2 mg total) by mouth 2 (two) times daily as needed for constipation., Disp: , Rfl:     carbidopa-levodopa  MG Oral Tab, Take 2 tablets by mouth 3 (three) times daily., Disp: , Rfl:     escitalopram 10 MG Oral Tab, Take 1 tablet (10 mg total) by mouth daily., Disp: , Rfl:     Donepezil HCl (ARICEPT) 5 MG Oral Tab, Take 1 tablet (5 mg total) by mouth nightly., Disp: 30 tablet, Rfl: 2    Metoprolol Succinate ER 50 MG Oral Tablet 24 Hr, Take 1 tablet (50 mg total) by mouth once daily., Disp: , Rfl: 3    famoTIDine (PEPCID) 20 MG Oral Tab, 1 tablet (20 mg total)  in the morning and 1 tablet (20 mg total) before bedtime., Disp: , Rfl: 5      Review of Systems:   A comprehensive 10 point review of systems was completed.     Pertinent positives and negatives noted in the HPI.         PHYSICAL EXAM:   Neurologic Exam  Vitals  Vitals:    04/18/24 1332   BP: 120/68   Pulse: 91   Resp: 16     General Appearance: Patient is a 88 year old female in no acute distress  Cardiac: Normal rate & regular rhythm  Skin: There are no rashes or other skin lesions.  Musculoskeletal: There is no scoliosis, or joint deformities  Neurologic examination:  Mental status: Patient is alert, attentive, and oriented x 3. Language is coherent and fluent without aphasia. Memory, comprehension and ability to follow commands were intact.   Cranial nerves II-XII: Optic discs were sharp. Pupils were round and reacted to light. Extraocular movements were full. There was no face, jaw, palate or tongue weakness or atrophy. Facial sensation was normal. Hearing was grossly intact. Shoulder shrug was  normal.   Motor exam revealed normal muscle bulk and tone. No atrophy or fasciculations. Manual muscle testing revealed MRC grade 5/5 strength throughout including proximal and distal muscles of the arms and legs.  Deep tendon reflexes were 2 at the biceps, brachioradialis, triceps, knee jerk, and ankle jerk. Plantar responses were flexor bilaterally.   Sensory exam revealed normal light touch perception. Vibratory perception and proprioception were intact at the toes. Pinprick and temperature were normal. Romberg sign was absent.  Complex motor skills revealed normal coordination. Finger-nose-finger intact.   Gait was narrow and stable, was able to walk on heels, toes and tandem without any difficulty.     ASSESSMENT/ACTIVE PROBLEM LIST:     Encounter Diagnoses   Name Primary?    Spasticity Yes    Severe dementia associated with other underlying disease, without behavioral disturbance, psychotic disturbance, mood disturbance, or anxiety ([facility])     Parkinson's disease without dyskinesia or fluctuating manifestations ([facility])        Discussion/Plan:  End stage dementia and parkinson's disease, with RUE contracture, and LUE spasticity at the elbow, with a combination of spasticity and contractures in the bilateral finger flexors.  Clinical question is whether patient would benefit from botox injections:  RUE is a contracture at the elbow, so this is irreversible, and botox in the proximal/elbow flexors of the LUE is not recommended, due to lack of causing a functional issue. However, the spasticity and contractures of the finger flexors are causing a decrease in the ability to do hand hygiene, and patient has skin breakdown/sloughing or tinea infections in the palms  Recommend trial of botox for the bilateral finger flexors, which may be able to improve a component of the finger flexor spasticity, though as mentioned, the proximal finger flexors have a contracture, so it will be only the distal finger flexors that can  improve  Continue attempts at hand hygiene, including attempting to clip finger nails  Botox would be given to the finger flexor muscles    Requested Prescriptions      No prescriptions requested or ordered in this encounter          We discussed in depth regarding the diagnosis, prognosis, treatment. The patient was given ample opportunity to ask questions. All questions and concerns were addressed.     Vanessa George DO  Neuromuscular and General Neurology  [facility]

## 2024-04-18 NOTE — TELEPHONE ENCOUNTER
Please see OV note from today. Botox recommended for finger flexor spasticity. Will plan to use 100 to 150 units.

## 2024-04-18 NOTE — PATIENT INSTRUCTIONS
Refill policies:    Allow 2-3 business days for refills; controlled substances may take longer.  Contact your pharmacy at least 5 days prior to running out of medication and have them send an electronic request or submit request through the “request refill” option in your [employer] account.  Refills are not addressed on weekends; covering physicians do not authorize routine medications on weekends.  No narcotics or controlled substances are refilled after noon on Fridays or by on call physicians.  By law, narcotics must be electronically prescribed.  A 30 day supply with no refills is the maximum allowed.  If your prescription is due for a refill, you may be due for a follow up appointment.  To best provide you care, patients receiving routine medications need to be seen at least once a year.  Patients receiving narcotic/controlled substance medications need to be seen at least once every 3 months.  In the event that your preferred pharmacy does not have the requested medication in stock (e.g. Backordered), it is your responsibility to find another pharmacy that has the requested medication available.  We will gladly send a new prescription to that pharmacy at your request.    Scheduling Tests:    If your physician has ordered radiology tests such as MRI or CT scans, please contact Central Scheduling at [phone] right away to schedule the test.  Once scheduled, the [facility] Centralized Referral Team will work with your insurance carrier to obtain pre-certification or prior authorization.  Depending on your insurance carrier, approval may take 3-10 days.  It is highly recommended patients assure they have received an authorization before having a test performed.  If test is done without insurance authorization, patient may be responsible for the entire amount billed.      Precertification and Prior Authorizations:  If your physician has recommended that you have a procedure or additional testing performed the [facility]  Centralized Referral Team will contact your insurance carrier to obtain pre-certification or prior authorization.    You are strongly encouraged to contact your insurance carrier to verify that your procedure/test has been approved and is a COVERED benefit.  Although the [facility] Centralized Referral Team does its due diligence, the insurance carrier gives the disclaimer that \"Although the procedure is authorized, this does not guarantee payment.\"    Ultimately the patient is responsible for payment.   Thank you for your understanding in this matter.  Paperwork Completion:  If you require FMLA or disability paperwork for your recovery, please make sure to either drop it off or have it faxed to our office at [phone]. Be sure the form has your name and date of birth on it.  The form will be faxed to our Forms Department and they will complete it for you.  There is a 25$ fee for all forms that need to be filled out.  Please be aware there is a 10-14 day turnaround time.  You will need to sign a release of information (CHRIS) form if your paperwork does not come with one.  You may call the Forms Department with any questions at [phone].  Their fax number is [phone].

## 2024-04-19 NOTE — TELEPHONE ENCOUNTER
Pt has medicare so no PA, ABN will need to be signed.     Provider noted 100-150 units.  Will send to provider to clarify if she wants to start with 100 u or have 200 units ordered for pt.

## 2024-11-15 ENCOUNTER — HOSPITAL ENCOUNTER (INPATIENT)
Facility: HOSPITAL | Age: 89
End: 2024-11-15
Attending: EMERGENCY MEDICINE | Admitting: INTERNAL MEDICINE
Payer: MEDICARE

## 2024-11-15 ENCOUNTER — HOSPITAL ENCOUNTER (INPATIENT)
Facility: HOSPITAL | Age: 89
LOS: 9 days | Discharge: NURSING, REHAB OR LONG-TERM CARE | DRG: 641 | End: 2024-11-24
Attending: EMERGENCY MEDICINE | Admitting: INTERNAL MEDICINE
Payer: MEDICARE

## 2024-11-15 DIAGNOSIS — G20.A1 PARKINSON'S DISEASE, UNSPECIFIED WHETHER DYSKINESIA PRESENT, UNSPECIFIED WHETHER MANIFESTATIONS FLUCTUATE (HCC): Primary | ICD-10-CM

## 2024-11-15 DIAGNOSIS — R62.7 FAILURE TO THRIVE IN ADULT: ICD-10-CM

## 2024-11-15 LAB
ALBUMIN SERPL-MCNC: 3.9 G/DL (ref 3.2–4.8)
ALBUMIN/GLOB SERPL: 1.1 {RATIO} (ref 1–2)
ALP LIVER SERPL-CCNC: 96 U/L
ALT SERPL-CCNC: <7 U/L
ANION GAP SERPL CALC-SCNC: 3 MMOL/L (ref 0–18)
AST SERPL-CCNC: 18 U/L (ref ?–34)
BASOPHILS # BLD AUTO: 0.02 X10(3) UL (ref 0–0.2)
BASOPHILS NFR BLD AUTO: 0.2 %
BILIRUB SERPL-MCNC: 0.3 MG/DL (ref 0.2–1.1)
BUN BLD-MCNC: 21 MG/DL (ref 9–23)
CALCIUM BLD-MCNC: 9.5 MG/DL (ref 8.7–10.4)
CHLORIDE SERPL-SCNC: 104 MMOL/L (ref 98–112)
CO2 SERPL-SCNC: 30 MMOL/L (ref 21–32)
CREAT BLD-MCNC: 0.78 MG/DL
EGFRCR SERPLBLD CKD-EPI 2021: 73 ML/MIN/1.73M2 (ref 60–?)
EOSINOPHIL # BLD AUTO: 0.14 X10(3) UL (ref 0–0.7)
EOSINOPHIL NFR BLD AUTO: 1.6 %
ERYTHROCYTE [DISTWIDTH] IN BLOOD BY AUTOMATED COUNT: 14.6 %
GLOBULIN PLAS-MCNC: 3.7 G/DL (ref 2–3.5)
GLUCOSE BLD-MCNC: 87 MG/DL (ref 70–99)
HCT VFR BLD AUTO: 44.5 %
HGB BLD-MCNC: 14.5 G/DL
IMM GRANULOCYTES # BLD AUTO: 0.03 X10(3) UL (ref 0–1)
IMM GRANULOCYTES NFR BLD: 0.4 %
LYMPHOCYTES # BLD AUTO: 3.35 X10(3) UL (ref 1–4)
LYMPHOCYTES NFR BLD AUTO: 39.1 %
MCH RBC QN AUTO: 29.5 PG (ref 26–34)
MCHC RBC AUTO-ENTMCNC: 32.6 G/DL (ref 31–37)
MCV RBC AUTO: 90.4 FL
MONOCYTES # BLD AUTO: 0.53 X10(3) UL (ref 0.1–1)
MONOCYTES NFR BLD AUTO: 6.2 %
NEUTROPHILS # BLD AUTO: 4.49 X10 (3) UL (ref 1.5–7.7)
NEUTROPHILS # BLD AUTO: 4.49 X10(3) UL (ref 1.5–7.7)
NEUTROPHILS NFR BLD AUTO: 52.5 %
OSMOLALITY SERPL CALC.SUM OF ELEC: 286 MOSM/KG (ref 275–295)
PLATELET # BLD AUTO: 262 10(3)UL (ref 150–450)
POTASSIUM SERPL-SCNC: 4.5 MMOL/L (ref 3.5–5.1)
PROT SERPL-MCNC: 7.6 G/DL (ref 5.7–8.2)
RBC # BLD AUTO: 4.92 X10(6)UL
SODIUM SERPL-SCNC: 137 MMOL/L (ref 136–145)
WBC # BLD AUTO: 8.6 X10(3) UL (ref 4–11)

## 2024-11-15 PROCEDURE — 99223 1ST HOSP IP/OBS HIGH 75: CPT | Performed by: INTERNAL MEDICINE

## 2024-11-15 RX ORDER — LACTULOSE 10 G/15ML
30 SOLUTION ORAL EVERY MORNING
COMMUNITY

## 2024-11-15 RX ORDER — METOCLOPRAMIDE HYDROCHLORIDE 5 MG/ML
10 INJECTION INTRAMUSCULAR; INTRAVENOUS EVERY 8 HOURS PRN
Status: DISCONTINUED | OUTPATIENT
Start: 2024-11-15 | End: 2024-11-16

## 2024-11-15 RX ORDER — FAMOTIDINE 20 MG/1
20 TABLET, FILM COATED ORAL 2 TIMES DAILY
Status: DISCONTINUED | OUTPATIENT
Start: 2024-11-15 | End: 2024-11-16 | Stop reason: SDUPTHER

## 2024-11-15 RX ORDER — SENNOSIDES 8.6 MG
17.2 TABLET ORAL NIGHTLY PRN
Status: DISCONTINUED | OUTPATIENT
Start: 2024-11-15 | End: 2024-11-16

## 2024-11-15 RX ORDER — HEPARIN SODIUM 5000 [USP'U]/ML
5000 INJECTION, SOLUTION INTRAVENOUS; SUBCUTANEOUS EVERY 12 HOURS SCHEDULED
Status: DISCONTINUED | OUTPATIENT
Start: 2024-11-15 | End: 2024-11-16

## 2024-11-15 RX ORDER — LACTULOSE 10 G/15ML
30 SOLUTION ORAL EVERY MORNING
Status: DISCONTINUED | OUTPATIENT
Start: 2024-11-16 | End: 2024-11-16

## 2024-11-15 RX ORDER — CARBIDOPA AND LEVODOPA 25; 100 MG/1; MG/1
2 TABLET ORAL 3 TIMES DAILY
Status: DISCONTINUED | OUTPATIENT
Start: 2024-11-15 | End: 2024-11-16 | Stop reason: SDUPTHER

## 2024-11-15 RX ORDER — BENZOCAINE/MENTHOL 6 MG-10 MG
1 LOZENGE MUCOUS MEMBRANE
COMMUNITY

## 2024-11-15 RX ORDER — SODIUM CHLORIDE 9 MG/ML
125 INJECTION, SOLUTION INTRAVENOUS CONTINUOUS
Status: DISCONTINUED | OUTPATIENT
Start: 2024-11-15 | End: 2024-11-16

## 2024-11-15 RX ORDER — AMLODIPINE BESYLATE 5 MG/1
5 TABLET ORAL DAILY
Status: DISCONTINUED | OUTPATIENT
Start: 2024-11-16 | End: 2024-11-16 | Stop reason: SDUPTHER

## 2024-11-15 RX ORDER — SODIUM CHLORIDE 9 MG/ML
INJECTION, SOLUTION INTRAVENOUS CONTINUOUS
Status: DISCONTINUED | OUTPATIENT
Start: 2024-11-15 | End: 2024-11-16

## 2024-11-15 RX ORDER — ONDANSETRON 2 MG/ML
4 INJECTION INTRAMUSCULAR; INTRAVENOUS EVERY 6 HOURS PRN
Status: DISCONTINUED | OUTPATIENT
Start: 2024-11-15 | End: 2024-11-24

## 2024-11-15 RX ORDER — DEXTROSE MONOHYDRATE AND SODIUM CHLORIDE 5; .45 G/100ML; G/100ML
INJECTION, SOLUTION INTRAVENOUS CONTINUOUS
Status: ACTIVE | OUTPATIENT
Start: 2024-11-15 | End: 2024-11-15

## 2024-11-15 RX ORDER — POLYETHYLENE GLYCOL 3350 17 G/17G
17 POWDER, FOR SOLUTION ORAL DAILY PRN
Status: DISCONTINUED | OUTPATIENT
Start: 2024-11-15 | End: 2024-11-16

## 2024-11-15 RX ORDER — METOPROLOL SUCCINATE 50 MG/1
50 TABLET, EXTENDED RELEASE ORAL
Status: DISCONTINUED | OUTPATIENT
Start: 2024-11-16 | End: 2024-11-16 | Stop reason: SDUPTHER

## 2024-11-15 RX ORDER — BISACODYL 10 MG
10 SUPPOSITORY, RECTAL RECTAL
Status: DISCONTINUED | OUTPATIENT
Start: 2024-11-15 | End: 2024-11-24

## 2024-11-15 RX ORDER — SODIUM PHOSPHATE, DIBASIC AND SODIUM PHOSPHATE, MONOBASIC 7; 19 G/230ML; G/230ML
1 ENEMA RECTAL ONCE AS NEEDED
Status: DISCONTINUED | OUTPATIENT
Start: 2024-11-15 | End: 2024-11-24

## 2024-11-15 NOTE — ED INITIAL ASSESSMENT (HPI)
Patient arrives via [facility] from [facility] in [place]. Contractures of all 4 extremities. Parkinson's. Takes liquid intake. Family wants Gtube placed. Patient has consultation for Gtube at end of month but family doesn't want to wait.

## 2024-11-15 NOTE — ED QUICK NOTES
Charge RN spoke with [facility]. 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 The family brings in shakes and liquids for the patient but the patient has been not drinking much lately. Family would like a Gtube placed. Patient has end stage Parksinson's. Nonverbal at baseline. Contractures in all extremities.

## 2024-11-15 NOTE — ED QUICK NOTES
Orders for admission, patient is aware of plan and ready to go upstairs. Any questions, please call ED RN Mena at extension [phone].     Patient Covid vaccination status: Fully vaccinated     COVID Test Ordered in ED: None    COVID Suspicion at Admission: N/A    Running Infusions:    sodium chloride 125 mL/hr (11/15/24 1630)        Mental Status/LOC at time of transport: Eyes open, nonverbal and contracted in all extremities.     Other pertinent information:   CIWA score: N/A   NIH score:  N/A

## 2024-11-15 NOTE — ED PROVIDER NOTES
Patient Seen in: [facility] Emergency Department      History     Chief Complaint   Patient presents with    Other     Stated Complaint: aide wants gtube    Subjective:   HPI      This is an 89-year-old female who has severe Parkinson's, contractions is completely bedridden from a nursing home who was sent here for admission and G-tube placement.  Her family states they want a G-tube placed and they could not get into see the gastroenterologist till later in November and they do not feel comfortable waiting.  Apparently she is not taking much fluid.  Patient is unable to communicate anything.  Presents here for further evaluation.    Objective:     Past Medical History:    Anemia    Cognitive communication deficit    COVID-19 virus infection    Dementia (HCC)    Depression    Dysphagia    Esophageal reflux    Essential hypertension    High blood pressure    Hyperlipidemia    Obesity    Osteoarthritis    Parkinson disease (HCC)    Prediabetes    Prediabetes    Unspecified fracture of shaft of humerus, right arm, sequela              Past Surgical History:   Procedure Laterality Date    Knee replacement surgery Bilateral                       Social History     Socioeconomic History    Marital status:    Tobacco Use    Smoking status: Never    Smokeless tobacco: Never   Vaping Use    Vaping status: Never Used   Substance and Sexual Activity    Alcohol use: No    Drug use: No   Other Topics Concern    Caffeine Concern Yes     Comment: 1 cup of tea daily    Exercise No                  Physical Exam     ED Triage Vitals [11/15/24 1625]   /68   Pulse 88   Resp 12   Temp 97.8 °F (36.6 °C)   Temp src Temporal   SpO2 93 %   O2 Device None (Room air)       Current Vitals:   Vital Signs  BP: 122/67  Pulse: 88  Resp: 12  Temp: 98.5 °F (36.9 °C)  Temp src: Axillary    Oxygen Therapy  SpO2: 93 %  O2 Device: None (Room air)        Physical Exam  GENERAL: Awake, alert oriented x3, illappearing.  Contracted.   Appears malnourished and underweight.  SKIN: Normal, warm, and dry.  HEENT:  Pupils equally round and reactive to light. Conjuctiva clear.  Oropharynx is clear and moist.   Lungs: Clear to auscultation bilaterally with no rales, no retractions, and no wheezing.  HEART:  Regular rate and rhythm. S1 and S2. No murmurs, no rubs or gallops.   ABDOMEN: Soft, nontender and nondistended. Normoactive bowel sounds. No rebound. No guarding.   EXTREMITIES: Atrophied extremities.    ED Course     Labs Reviewed   COMP METABOLIC PANEL (14) - Abnormal; Notable for the following components:       Result Value    ALT <7 (*)     Globulin  3.7 (*)     All other components within normal limits   CBC WITH DIFFERENTIAL WITH PLATELET                   MDM      89-year-old female with Parkinson's sent to the ER for G-tube placement.    Admission disposition: 11/15/2024  5:41 PM       IV line was established of normal saline.  Basic labs were obtained.  CBC: White blood cell count 8.6.  Hemoglobin 14.5.  Platelet 262.  CMP: BUN 21.  Creatinine 0.7.  Glucose 87.  Bicarb 30.      Patient will be admitted for further workup and evaluation.  Case discussed with Stella hospitalist dr smith      Disposition and Plan     Clinical Impression:  No diagnosis found.     Disposition:  Admit  11/15/2024  5:41 pm    Follow-up:  No follow-up provider specified.        Medications Prescribed:  Current Discharge Medication List              Supplementary Documentation:         Hospital Problems       Present on Admission  Date Reviewed: 4/18/2024   None

## 2024-11-16 ENCOUNTER — APPOINTMENT (OUTPATIENT)
Dept: GENERAL RADIOLOGY | Facility: HOSPITAL | Age: 89
DRG: 641 | End: 2024-11-16
Attending: INTERNAL MEDICINE
Payer: MEDICARE

## 2024-11-16 ENCOUNTER — APPOINTMENT (OUTPATIENT)
Dept: GENERAL RADIOLOGY | Facility: HOSPITAL | Age: 89
End: 2024-11-16
Attending: INTERNAL MEDICINE
Payer: MEDICARE

## 2024-11-16 LAB
ALBUMIN SERPL-MCNC: 3 G/DL (ref 3.2–4.8)
ALBUMIN/GLOB SERPL: 0.8 {RATIO} (ref 1–2)
ALP LIVER SERPL-CCNC: 75 U/L
ALT SERPL-CCNC: <7 U/L
ANION GAP SERPL CALC-SCNC: 5 MMOL/L (ref 0–18)
AST SERPL-CCNC: 16 U/L (ref ?–34)
BILIRUB SERPL-MCNC: 0.4 MG/DL (ref 0.2–1.1)
BUN BLD-MCNC: 15 MG/DL (ref 9–23)
CALCIUM BLD-MCNC: 8.9 MG/DL (ref 8.7–10.4)
CHLORIDE SERPL-SCNC: 109 MMOL/L (ref 98–112)
CO2 SERPL-SCNC: 26 MMOL/L (ref 21–32)
CREAT BLD-MCNC: 0.64 MG/DL
EGFRCR SERPLBLD CKD-EPI 2021: 85 ML/MIN/1.73M2 (ref 60–?)
ERYTHROCYTE [DISTWIDTH] IN BLOOD BY AUTOMATED COUNT: 14.4 %
GLOBULIN PLAS-MCNC: 3.8 G/DL (ref 2–3.5)
GLUCOSE BLD-MCNC: 103 MG/DL (ref 70–99)
HCT VFR BLD AUTO: 39.2 %
HGB BLD-MCNC: 12.5 G/DL
MAGNESIUM SERPL-MCNC: 1.8 MG/DL (ref 1.6–2.6)
MCH RBC QN AUTO: 29.1 PG (ref 26–34)
MCHC RBC AUTO-ENTMCNC: 31.9 G/DL (ref 31–37)
MCV RBC AUTO: 91.4 FL
OSMOLALITY SERPL CALC.SUM OF ELEC: 291 MOSM/KG (ref 275–295)
PHOSPHATE SERPL-MCNC: 2.7 MG/DL (ref 2.4–5.1)
PLATELET # BLD AUTO: 226 10(3)UL (ref 150–450)
POTASSIUM SERPL-SCNC: 3.7 MMOL/L (ref 3.5–5.1)
PROT SERPL-MCNC: 6.8 G/DL (ref 5.7–8.2)
RBC # BLD AUTO: 4.29 X10(6)UL
SODIUM SERPL-SCNC: 140 MMOL/L (ref 136–145)
WBC # BLD AUTO: 7 X10(3) UL (ref 4–11)

## 2024-11-16 PROCEDURE — 99232 SBSQ HOSP IP/OBS MODERATE 35: CPT | Performed by: INTERNAL MEDICINE

## 2024-11-16 PROCEDURE — 71045 X-RAY EXAM CHEST 1 VIEW: CPT | Performed by: INTERNAL MEDICINE

## 2024-11-16 RX ORDER — LACTULOSE 10 G/15ML
30 SOLUTION ORAL EVERY MORNING
Status: DISCONTINUED | OUTPATIENT
Start: 2024-11-17 | End: 2024-11-17

## 2024-11-16 RX ORDER — ACETAMINOPHEN 500 MG
500 TABLET ORAL EVERY 6 HOURS PRN
Status: DISCONTINUED | OUTPATIENT
Start: 2024-11-16 | End: 2024-11-16 | Stop reason: SDUPTHER

## 2024-11-16 RX ORDER — ACETAMINOPHEN 160 MG/5ML
1000 SOLUTION ORAL EVERY 6 HOURS PRN
Status: DISCONTINUED | OUTPATIENT
Start: 2024-11-16 | End: 2024-11-24

## 2024-11-16 RX ORDER — POLYETHYLENE GLYCOL 3350 17 G/17G
17 POWDER, FOR SOLUTION ORAL DAILY PRN
Status: DISCONTINUED | OUTPATIENT
Start: 2024-11-16 | End: 2024-11-24

## 2024-11-16 RX ORDER — SODIUM CHLORIDE, SODIUM LACTATE, POTASSIUM CHLORIDE, CALCIUM CHLORIDE 600; 310; 30; 20 MG/100ML; MG/100ML; MG/100ML; MG/100ML
INJECTION, SOLUTION INTRAVENOUS CONTINUOUS
Status: DISCONTINUED | OUTPATIENT
Start: 2024-11-16 | End: 2024-11-20

## 2024-11-16 RX ORDER — ACETAMINOPHEN 160 MG/5ML
500 SOLUTION ORAL EVERY 6 HOURS PRN
Status: DISCONTINUED | OUTPATIENT
Start: 2024-11-16 | End: 2024-11-24

## 2024-11-16 RX ORDER — FAMOTIDINE 40 MG/5ML
20 POWDER, FOR SUSPENSION ORAL 2 TIMES DAILY
Status: DISCONTINUED | OUTPATIENT
Start: 2024-11-16 | End: 2024-11-24

## 2024-11-16 RX ORDER — SENNOSIDES 8.8 MG/5ML
10 LIQUID ORAL 2 TIMES DAILY PRN
Status: DISCONTINUED | OUTPATIENT
Start: 2024-11-16 | End: 2024-11-24

## 2024-11-16 RX ORDER — ACETAMINOPHEN 500 MG
1000 TABLET ORAL EVERY 6 HOURS PRN
Status: DISCONTINUED | OUTPATIENT
Start: 2024-11-16 | End: 2024-11-16 | Stop reason: SDUPTHER

## 2024-11-16 RX ORDER — SENNOSIDES 8.6 MG
17.2 TABLET ORAL 2 TIMES DAILY PRN
Status: DISCONTINUED | OUTPATIENT
Start: 2024-11-16 | End: 2024-11-16 | Stop reason: SDUPTHER

## 2024-11-16 RX ORDER — CARBIDOPA AND LEVODOPA 25; 100 MG/1; MG/1
2 TABLET ORAL 3 TIMES DAILY
Status: DISCONTINUED | OUTPATIENT
Start: 2024-11-16 | End: 2024-11-24

## 2024-11-16 RX ORDER — MAGNESIUM SULFATE HEPTAHYDRATE 40 MG/ML
2 INJECTION, SOLUTION INTRAVENOUS ONCE
Status: COMPLETED | OUTPATIENT
Start: 2024-11-16 | End: 2024-11-16

## 2024-11-16 RX ORDER — SODIUM BICARBONATE 325 MG/1
325 TABLET ORAL AS NEEDED
Status: DISCONTINUED | OUTPATIENT
Start: 2024-11-16 | End: 2024-11-24

## 2024-11-16 RX ORDER — ENOXAPARIN SODIUM 100 MG/ML
40 INJECTION SUBCUTANEOUS NIGHTLY
Status: DISCONTINUED | OUTPATIENT
Start: 2024-11-16 | End: 2024-11-24

## 2024-11-16 NOTE — SLP NOTE
Pt is non-responsive to max cues to wake up.  Spoke with RN who reports that she is not responsive to even a sternal rub.  Will hold evaluation at this time until alert state improves.

## 2024-11-16 NOTE — PROGRESS NOTES
[facility]   part of [facility]     Hospitalist Progress Note     Sultana Hess Patient Status:  Inpatient    1935 MRN [id number]   Location [facility] 3NW-A Attending Parul Falk MD   Hosp Day # 1 PCP Aubrey Tsai     Chief Complaint: FTT    Subjective:     Patient not responsive     Objective:    Review of Systems:   Unable to obtain     Vital signs:  Temp:  [97.8 °F (36.6 °C)-98.7 °F (37.1 °C)] 98.5 °F (36.9 °C)  Pulse:  [85-91] 85  Resp:  [12-16] 16  BP: (122-151)/(67-78) 138/73  SpO2:  [91 %-95 %] 95 %    Physical Exam:    General: No acute distress, not responsive, contracted, malnourished appearing   Respiratory: No wheezes, no rhonchi  Cardiovascular: S1, S2, regular rate and rhythm  Abdomen: Soft, Non-tender, non-distended, positive bowel sounds  Neuro: not responsive   Extremities: No edema      Diagnostic Data:    Labs:  Recent Labs   Lab 11/15/24  1633 24  0501   WBC 8.6 7.0   HGB 14.5 12.5   MCV 90.4 91.4   .0 226.0       Recent Labs   Lab 11/15/24  1633 24  0502   GLU 87 103*   BUN 21 15   CREATSERUM 0.78 0.64   CA 9.5 8.9   ALB 3.9 3.0*    140   K 4.5 3.7    109   CO2 30.0 26.0   ALKPHO 96 75   AST 18 16   ALT <7* <7*   BILT 0.3 0.4   TP 7.6 6.8       CrCl cannot be calculated (Unknown ideal weight.).    No results for input(s): \"TROP\", \"TROPHS\", \"CK\" in the last 168 hours.    No results for input(s): \"PTP\", \"INR\" in the last 168 hours.               Microbiology    No results found for this visit on 11/15/24.      Imaging: Reviewed in Epic.    Medications:    heparin  5,000 Units Subcutaneous 2 times per day    amLODIPine  5 mg Oral Daily    carbidopa-levodopa  2 tablet Oral TID    famotidine  20 mg Oral BID    lactulose  30 mL Oral QAM    metoprolol succinate ER  50 mg Oral Daily       Assessment & Plan:      #Failure to thrive  #Advanced dementia  -nonverbal/nondecisional   -do not advise PEG but family would like to proceed, Son and  daughter are physicians     #Acute encephalopathy 2/2 not being able to take her Sinemet likely  -place Dobbhoff if able until PEG can be placed     #Parkinson's Disease  -Sinemet     #Hypertension  -Amlodipine   -Metoprolol    #Depression  #GERD     D/w POA Son Jimenez Pride, DO    Supplementary Documentation:     Quality:  DVT Mechanical Prophylaxis:     Early ambuation  DVT Pharmacologic Prophylaxis   Medication    heparin (Porcine) 5000 UNIT/ML injection 5,000 Units                Code Status: DNAR/Selective Treatment  [place]: No urinary catheter in place      The 21st Century Cures Act makes medical notes like these available to patients in the interest of transparency. Please be advised this is a medical document. Medical documents are intended to carry relevant information, facts as evident, and the clinical opinion of the practitioner. The medical note is intended as peer to peer communication and may appear blunt or direct. It is written in medical language and may contain abbreviations or verbiage that are unfamiliar.              **Certification      PHYSICIAN Certification of Need for Inpatient Hospitalization - Initial Certification    Patient will require inpatient services that will reasonably be expected to span two midnight's based on the clinical documentation in H+P.   Based on patients current state of illness, I anticipate that, after discharge, patient will require TBD.

## 2024-11-16 NOTE — DIETARY MALNUTRITION NOTE
[facility]   part of [facility]  NUTRITION ASSESSMENT    Pt meets severe malnutrition criteria at this time.    CRITERIA FOR MALNUTRITION DIAGNOSIS:  Criteria for severe malnutrition diagnosis: chronic illness related to energy intake less than 75% for greater than 1 month and muscle mass severe depletion    NUTRITION INTERVENTION:    RD nutrition Care Plan- Recommend EN (enteral nutrition) support if unable to take adequate PO safely within 1-2 days  Meal and Snacks - ADAT per SLP.  Enteral Nutrition - Via DHT vs PEG, recommend initiating Jevity 1.5 at 10 ml/hr and advancing 10 ml/hr q4hrs to GOAL: Jevity 1.5 at 45 ml/hr.   This will provide 1620 kcal, 69 grams protein, 821 ml total free water, and 100% of RDI's.   Recommend 130 ml water flush q 4 hours, TF+FWF provides 1601 ml total fluids.   Vitamin and Mineral Supplements - Recommend adding Thiamine daily x 5 days.  Coordination of Nutrition Care - Recommend SLP consult prior to diet advancement. and Palliative care consult for goal of care    PATIENT STATUS: 89 year old female admitted on 11/15 presents with FTT and request for G-tube placement. Per report, family has been bringing in shakes for pt at NH but pt declining and not eating or drinking much lately. Pt screened d/t MST score 3 and consult for tube feeds. Noted pt with end stage dementia. Attempted to visit at bedside but no family present. Pt is nonverbal and not responsive to even sternal rub at this time. SLP attempted to eval but cannot given above - remains NPO. Per hospitalist note, PEG is not medically advised but family wishes to proceed anyway. Plan to place DHT until PEG can be placed. Pt with abd distention but no other GI symptoms noted. NKFA. Will start TF slowly as she is at risk of refeeding syndrome and continue to monitor lytes and tolerance.    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   BMI: Body mass index is 24.6 kg/m².  IBW: 52.3 kg    WEIGHT HISTORY:   Patient Weight(s) for the past 336 hrs:   Weight   11/16/24 0118 63 kg (138 lb 14.2 oz)   11/15/24 1625 63 kg (138 lb 14.2 oz)       Wt Readings from Last 10 Encounters:   11/16/24 63 kg (138 lb 14.2 oz)   04/15/23 63.5 kg (139 lb 15.9 oz)   10/03/22 63.5 kg (140 lb)   09/16/22 65.7 kg (144 lb 13.5 oz)   04/10/22 70.3 kg (154 lb 15.7 oz)   01/14/22 70.3 kg (154 lb 15.7 oz)   12/01/21 70.3 kg (155 lb)   11/19/21 68 kg (150 lb)   04/09/21 70.3 kg (155 lb)   04/07/21 70.3 kg (155 lb)        NUTRITION:  Diet: No orders of the defined types were placed in this encounter.     Food Allergies: No  Cultural/Ethnic/Zoroastrian Preferences Addressed: Yes    Percent Meals Eaten (last 3 days)       None            GI SYSTEM REVIEW:  distention  Skin/Wounds: WNL    NUTRITION RELATED PHYSICAL FINDINGS:     1. Body Fat/Muscle Mass: severe muscle depletion Temple region and Clavicle region per visual exam     2. Fluid Accumulation: none per RN documentation    NUTRITION PRESCRIPTION:  63 kg Actual Body Weight  Calories: 0879-9486 calories/day (23-28 kcal/kg)  Protein: 63-95 grams protein/day (1.0-1.5 gm/kg)  Fluid: ~1 ml/kcal or per MD discretion    NUTRITION DIAGNOSIS/PROBLEM:  Malnutrition related to insufficient appetite resulting in inadequate nutrition intake as evidenced by documented/reported insufficient oral intake and loss of muscle mass    MONITOR AND EVALUATE/NUTRITION GOALS:  Weight stable within 1 to 2 lbs during admission - New  Start alternative nutrition in 24-48 hrs if diet is not able to advance- New      MEDICATIONS:  Pepcid, lactulose    LABS:  Reviewed     Pt is at High nutrition risk    Patricia Alfredo, RD, LDN, CNSC  Clinical Dietitian  Spectra: [phone]

## 2024-11-16 NOTE — PLAN OF CARE
NURSING ADMISSION NOTE      Patient admitted via Cart  Oriented to room.  Safety precautions initiated.  Bed in low position.  Call light in reach.    Pt nonverbal. VSS stable on RA. No apparent distress. Briefed for incontinence. BUE contractures

## 2024-11-16 NOTE — H&P
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    Family unavailable at the time of my evaluation. Patient is awake and is able to tell me her name. She did not answer any further questions.     ED blood work is within normal limits.    History/Other:    Past Medical History:  Past Medical History:    Anemia    Cognitive communication deficit    COVID-19 virus infection    Dementia (HCC)    Depression    Dysphagia    Esophageal reflux    Essential hypertension    High blood pressure    Hyperlipidemia    Obesity    Osteoarthritis    Parkinson disease (HCC)    Prediabetes    Prediabetes    Unspecified fracture of shaft of humerus, right arm, sequela     Past Surgical History:   Past Surgical History:   Procedure Laterality Date    Knee replacement surgery Bilateral             Family History:   Family History   Problem Relation Age of Onset    Bleeding Disorders Neg     Genetic Disease Neg      Social History:    reports that she has never smoked. She has never used smokeless tobacco. She reports that she does not drink alcohol and does not use drugs.     Allergies: Allergies[1]    Medications:  Medications Ordered Prior to Encounter[2]    Review of Systems:   A comprehensive review of systems was completed.    Pertinent positives and negatives noted in the HPI.    Objective:   Physical Exam:    /67   Pulse 88   Temp 98.5 °F (36.9 °C) (Axillary)   Resp 12   Wt 138 lb 14.2 oz (63 kg)   SpO2 93%    BMI 24.60 kg/m²   General: No acute distress, Alert  Respiratory: No rhonchi, no wheezes  Cardiovascular: S1, S2. Regular rate and rhythm  Abdomen: Soft, Non-tender, non-distended, positive bowel sounds  Neuro: Able to state her name.  Extremities: Multiple contractures in b/l upper and lower extremities    Results:    Labs:      Labs Last 24 Hours:    Recent Labs   Lab 11/15/24  1633   RBC 4.92   HGB 14.5   HCT 44.5   MCV 90.4   MCH 29.5   MCHC 32.6   RDW 14.6   NEPRELIM 4.49   WBC 8.6   .0       Recent Labs   Lab 11/15/24  1633   GLU 87   BUN 21   CREATSERUM 0.78   EGFRCR 73   CA 9.5   ALB 3.9      K 4.5      CO2 30.0   ALKPHO 96   AST 18   ALT <7*   BILT 0.3   TP 7.6       Lab Results   Component Value Date    INR 1.23 (H) 09/16/2022    INR 1.11 04/10/2022       No results for input(s): \"TROP\", \"TROPHS\", \"CK\" in the last 168 hours.    No results for input(s): \"TROP\", \"PBNP\" in the last 168 hours.    No results for input(s): \"PCT\" in the last 168 hours.    Imaging: Imaging data reviewed in Epic.    Assessment & Plan:      # Failure to thrive  #Advanced dementia  -Palliative care consult     #Hypertension  #Depression  #GERD    Plan of care discussed with patient    Parul Falk MD    Supplementary Documentation:     The 21st Century Cures Act makes medical notes like these available to patients in the interest of transparency. Please be advised this is a medical document. Medical documents are intended to carry relevant information, facts as evident, and the clinical opinion of the practitioner. The medical note is intended as peer to peer communication and may appear blunt or direct. It is written in medical language and may contain abbreviations or verbiage that are unfamiliar.                                       [1] No Known Allergies  [2]   No current facility-administered medications on file prior to encounter.     Current Outpatient Medications on File Prior to Encounter   Medication  Sig Dispense Refill    lactulose 20 GM/30ML Oral Solution Take 30 mL (20 g total) by mouth every morning.      hydrocortisone 1 % External Cream Apply 1 Application topically daily as needed (dermatitis).      Loratadine 10 MG Oral Cap Take 10 mg by mouth daily.      polyethylene glycol, PEG 3350, 17 g Oral Powd Pack Take 17 g by mouth daily.      amLODIPine 5 MG Oral Tab Take 1 tablet (5 mg total) by mouth daily.  0    acetaminophen 325 MG Oral Tab Take 2 tablets (650 mg total) by mouth every 6 (six) hours as needed for Pain.      sennosides 8.6 MG Oral Tab Take 2 tablets (17.2 mg total) by mouth 2 (two) times daily as needed for constipation.      carbidopa-levodopa  MG Oral Tab Take 2 tablets by mouth 3 (three) times daily.      Metoprolol Succinate ER 50 MG Oral Tablet 24 Hr Take 1 tablet (50 mg total) by mouth once daily.  3    famoTIDine (PEPCID) 20 MG Oral Tab 1 tablet (20 mg total)  in the morning and 1 tablet (20 mg total) before bedtime.  5

## 2024-11-17 ENCOUNTER — APPOINTMENT (OUTPATIENT)
Dept: GENERAL RADIOLOGY | Facility: HOSPITAL | Age: 89
DRG: 641 | End: 2024-11-17
Attending: INTERNAL MEDICINE
Payer: MEDICARE

## 2024-11-17 ENCOUNTER — APPOINTMENT (OUTPATIENT)
Dept: GENERAL RADIOLOGY | Facility: HOSPITAL | Age: 89
End: 2024-11-17
Attending: INTERNAL MEDICINE
Payer: MEDICARE

## 2024-11-17 LAB
ADENOVIRUS F 40/41 PCR: NEGATIVE
ANION GAP SERPL CALC-SCNC: 1 MMOL/L (ref 0–18)
ASTROVIRUS PCR: NEGATIVE
BUN BLD-MCNC: 11 MG/DL (ref 9–23)
C CAYETANENSIS DNA SPEC QL NAA+PROBE: NEGATIVE
C DIFF TOX B STL QL: NEGATIVE
CALCIUM BLD-MCNC: 8.6 MG/DL (ref 8.7–10.4)
CAMPY SP DNA.DIARRHEA STL QL NAA+PROBE: NEGATIVE
CHLORIDE SERPL-SCNC: 107 MMOL/L (ref 98–112)
CO2 SERPL-SCNC: 27 MMOL/L (ref 21–32)
CREAT BLD-MCNC: 0.62 MG/DL
CRYPTOSP DNA SPEC QL NAA+PROBE: NEGATIVE
EAEC PAA PLAS AGGR+AATA ST NAA+NON-PRB: NEGATIVE
EC STX1+STX2 + H7 FLIC SPEC NAA+PROBE: NEGATIVE
EGFRCR SERPLBLD CKD-EPI 2021: 86 ML/MIN/1.73M2 (ref 60–?)
ENTAMOEBA HISTOLYTICA PCR: NEGATIVE
EPEC EAE GENE STL QL NAA+NON-PROBE: NEGATIVE
ETEC LTA+ST1A+ST1B TOX ST NAA+NON-PROBE: NEGATIVE
GIARDIA LAMBLIA PCR: NEGATIVE
GLUCOSE BLD-MCNC: 114 MG/DL (ref 70–99)
GLUCOSE BLD-MCNC: 130 MG/DL (ref 70–99)
GLUCOSE BLD-MCNC: 149 MG/DL (ref 70–99)
GLUCOSE BLD-MCNC: 95 MG/DL (ref 70–99)
MAGNESIUM SERPL-MCNC: 2 MG/DL (ref 1.6–2.6)
NOROVIRUS GI/GII PCR: NEGATIVE
OSMOLALITY SERPL CALC.SUM OF ELEC: 280 MOSM/KG (ref 275–295)
P SHIGELLOIDES DNA STL QL NAA+PROBE: NEGATIVE
PHOSPHATE SERPL-MCNC: 2.7 MG/DL (ref 2.4–5.1)
POTASSIUM SERPL-SCNC: 3.7 MMOL/L (ref 3.5–5.1)
ROTAVIRUS A PCR: NEGATIVE
SALMONELLA DNA SPEC QL NAA+PROBE: NEGATIVE
SAPOVIRUS PCR: NEGATIVE
SHIGELLA SP+EIEC IPAH ST NAA+NON-PROBE: NEGATIVE
SODIUM SERPL-SCNC: 135 MMOL/L (ref 136–145)
V CHOLERAE DNA SPEC QL NAA+PROBE: NEGATIVE
VIBRIO DNA SPEC NAA+PROBE: NEGATIVE
YERSINIA DNA SPEC NAA+PROBE: NEGATIVE

## 2024-11-17 PROCEDURE — 74018 RADEX ABDOMEN 1 VIEW: CPT | Performed by: INTERNAL MEDICINE

## 2024-11-17 PROCEDURE — 99232 SBSQ HOSP IP/OBS MODERATE 35: CPT | Performed by: INTERNAL MEDICINE

## 2024-11-17 NOTE — PROGRESS NOTES
Pt is non verbal- responds to physical stimuli and verbal stimuli. Pt follows you with her eyes.  RAVEN Lakhani in place and running per order.   Aspiration precautions in place.  Safety measures in place.

## 2024-11-17 NOTE — PLAN OF CARE
Pt resting in bed.  Positioned for comfort  Did not respond or react to verbal or physical stimuli this am or afternoon  This writer did notice pt following w/ her eyes after dubhoff placed  Dubhoff placed w/out difficulty  Jevity 1.5 initiated, as ordered  Aspiration precautions in place  Incont of b&b.  Bm this afternoon  Ivf's  GI aware of consult

## 2024-11-17 NOTE — PHYSICAL THERAPY NOTE
PHYSICAL THERAPY EVALUATION - INPATIENT     Room Number: 319/319-A  Evaluation Date: 11/17/2024  Type of Evaluation: Initial  Physician Order: PT Eval and Treat    Presenting Problem: admit for evaluation for possible G tube  Co-Morbidities : dementia, parkinsons, UE spasticity, HTN, depression, GERD  Reason for Therapy: Mobility Dysfunction and Discharge Planning    PHYSICAL THERAPY ASSESSMENT   Patient is a 89 year old female admitted 11/15/2024 for evaluation for possible G tube.   Patient is currently functioning at baseline with bed mobility and transfers. Prior to admission, patient's baseline is dependent.     Patient will benefit from continued skilled PT Services return to long term care with restorative therapies.    Patient is currently functioning at her baseline. No further IP PT needs identified. IP PT to sign off at this time.     PLAN  Patient has been evaluated and presents with no skilled Physical Therapy needs at this time.  Patient discharged from Physical Therapy services.  Please re-order if a new functional limitation presents during this admission.    PT Device Recommendation: Mechanical lift;Hospital bed    GOALS  Patient was able to achieve the following goals ...    Patient was able to transfer Unable before admission   Patient able to ambulate on level surfaces Unable before admission     HOME SITUATION  Type of Home: Skilled nursing facility                                 Patient Regularly Uses: Hospital bed     Prior Level of [place]: per chart review dependent for all ADLs/IADLs    SUBJECTIVE  Pt non verbal. Intermittently able to track with eyes. No meaningful reactions to stimuli    OBJECTIVE  Precautions: Bed/chair alarm  Fall Risk: High fall risk    WEIGHT BEARING RESTRICTION     PAIN ASSESSMENT  Rating: Unable to rate          COGNITION  Overall Cognitive Status:  Impaired  Arousal/Alertness:  generalized responses and inconsistent responses to stimuli  Following Commands:   does not follow commands    RANGE OF MOTION AND STRENGTH ASSESSMENT  Upper extremity ROM: LUE contracted into elbow flexion, wrist flexion, digit flexion. RUE with incr tone however passively able to extend elbow as resting position in flexion.    Lower extremity ROM: LLE with slight passive ROM at hip/knee however incr tone noted. RLE with difficulty performing PROM, incr tone noted. Resting posture of knee extension and ankle plantarflexion bilaterally    BALANCE  Static Sitting: Not applicable  Dynamic Sitting: Not applicable  Static Standing: Not applicable  Dynamic Standing: Not applicable    ADDITIONAL TESTS                                    ACTIVITY TOLERANCE                         O2 WALK       NEUROLOGICAL FINDINGS  Neurological Findings: Tone              Tone: incr tone thru all limbs      AM-PAC '6-Clicks' INPATIENT SHORT FORM - BASIC MOBILITY  How much difficulty does the patient currently have...  Patient Difficulty: Turning over in bed (including adjusting bedclothes, sheets and blankets)?: Unable   Patient Difficulty: Sitting down on and standing up from a chair with arms (e.g., wheelchair, bedside commode, etc.): Unable   Patient Difficulty: Moving from lying on back to sitting on the side of the bed?: Unable   How much help from another person does the patient currently need...   Help from Another: Moving to and from a bed to a chair (including a wheelchair)?: Total   Help from Another: Need to walk in hospital room?: Total   Help from Another: Climbing 3-5 steps with a railing?: Total       AM-PAC Score:  Raw Score: 6   Approx Degree of Impairment: 100%   Standardized Score (AM-PAC Scale): 23.55   CMS Modifier (G-Code): CN    FUNCTIONAL ABILITY STATUS  Gait Assessment   Functional Mobility/Gait Assessment  Gait Assistance: Not tested    Skilled Therapy Provided     Bed Mobility:  Rolling: dependent  Supine to sit: n/a      Transfer Mobility:  Sit to stand: n/a    Therapist's comments:pt educated  on role of IP PT services, PT evaluation purpose, PT POC, PT goals, device recommendations, dc recommendations, and importance of mobility while hospitalized.   -pt able to track this writer x1 with max verbal cues. No active movement noted for duration of session by pt in any extremity.    Exercise/Education Provided:  Bed mobility  Body mechanics  ROM  Lower therapeutic exercise:  Ankle pumps  SLR  (LE exercise performed within avail PROM)    Patient End of Session: In bed;Call light within reach;RN aware of session/findings;Alarm set    Patient Evaluation Complexity Level:  History Moderate - 1 or 2 personal factors and/or co-morbidities   Examination of body systems Moderate - addressing a total of 3 or more elements   Clinical Presentation Low- Stable   Clinical Decision Making Low Complexity       PT Session Time: 15 minutes

## 2024-11-17 NOTE — PLAN OF CARE
Pt non verbal  Noted pt following this write w/ her eyes x 1 this am  Do not move on her own,  contracted  Resting on clintron bed  Dependent for all right Marvin's  Dubhoff remains @ 58cc  Residuals = Zero  Aspiration precautions continued  Abdomen firm this am.  KUB ordered by hospitalist.     Abdomen now soft & rounded

## 2024-11-17 NOTE — CONSULTS
[facility]                       Gastroenterology Consultation-[facility]    Sultana Hess Patient Status:  Inpatient    1935 MRN [id number]   Location [facility] 3NW-A Attending Parul Falk MD   Hosp Day # 2 PCP Aubrey Tsai         Reason for consultation: Failure to thrive  HPI: Ms. Hess is an 89 yo F with Parkinson's disease, dementia, GERD who presents with failure to thrive. Pt had dobhoff placed yesterday and was started on tube feeds. Spoke with son who reports that pt stopped eating 2 weeks ago, and prior to that was much more conversational. Pt currently cannot provide history. Her abdomen was slightly distended, so abdominal X ray was ordered. Per nursing, she has had some loose stools.   PMHx:   Past Medical History:    Anemia    Cognitive communication deficit    COVID-19 virus infection    Dementia (HCC)    Depression    Dysphagia    Esophageal reflux    Essential hypertension    High blood pressure    Hyperlipidemia    Obesity    Osteoarthritis    Parkinson disease (HCC)    Prediabetes    Prediabetes    Unspecified fracture of shaft of humerus, right arm, sequela     PSHx:   Past Surgical History:   Procedure Laterality Date    Knee replacement surgery Bilateral            Medications:    enoxaparin (Lovenox) 40 MG/0.4ML SUBQ injection 40 mg  40 mg Subcutaneous Nightly    lactated ringers infusion   Intravenous Continuous    [COMPLETED] magnesium sulfate in sterile water for injection 2 g/50mL IVPB premix 2 g  2 g Intravenous Once    dextrose 10% infusion (TPN no rate)   Intravenous Continuous PRN    pancrelipase (Lip-Prot-Amyl) (Zenpep) DR particles cap 10,000 Units  10,000 Units Per G Tube PRN    And    sodium bicarbonate tab 325 mg  325 mg Per G Tube PRN    acetaminophen (Tylenol) 160 MG/5ML oral liquid 500 mg  500 mg Per NG Tube Q6H PRN    Or    acetaminophen (Tylenol) 160 MG/5ML oral liquid 1,000 mg  1,000 mg Per NG Tube Q6H PRN     amLODIPine (Norvasc) 1mg/ml oral suspension 5 mg  5 mg Per NG Tube Daily    carbidopa-levodopa (SINEMET)  MG per tab 2 tablet  2 tablet Per NG Tube TID    famoTIDine (Pepcid) 40 MG/5ML oral suspension 20 mg  20 mg Per NG Tube BID    melatonin tab 3 mg  3 mg Per NG Tube Nightly PRN    metoprolol tartrate (Lopressor) 10 mg/mL oral suspension 25 mg  25 mg Per NG Tube 2x Daily(Beta Blocker)    polyethylene glycol (PEG 3350) (Miralax) 17 g oral packet 17 g  17 g Per NG Tube Daily PRN    senna (Senokot) 8.8 MG/5ML oral syrup 17.6 mg  10 mL Per NG Tube BID PRN    [] dextrose 5%-sodium chloride 0.45% infusion   Intravenous Continuous    bisacodyl (Dulcolax) 10 MG rectal suppository 10 mg  10 mg Rectal Daily PRN    fleet enema (Fleet) rectal enema 133 mL  1 enema Rectal Once PRN    ondansetron (Zofran) 4 MG/2ML injection 4 mg  4 mg Intravenous Q6H PRN     Allergies: Allergies[1]  SocHx:    Social History     Socioeconomic History    Marital status:    Tobacco Use    Smoking status: Never    Smokeless tobacco: Never   Vaping Use    Vaping status: Never Used   Substance and Sexual Activity    Alcohol use: No    Drug use: No   Other Topics Concern    Caffeine Concern Yes     Comment: 1 cup of tea daily    Exercise No     Social Drivers of Health     Food Insecurity: Unknown (2024)    Food Insecurity     Food Insecurity: Patient unable to answer   Transportation Needs: Unknown (2024)    Transportation Needs     Lack of Transportation: Patient unable to answer   Housing Stability: Unknown (2024)    Housing Stability     Housing Instability: Patient unable to answer      FamHx:   Family History   Problem Relation Age of Onset    Bleeding Disorders Neg     Genetic Disease Neg       ROS:  In addition to the pertinent positives described above:            Infectious Disease:  No chronic infections or recent fevers prior to the acute illness            Cardiovascular: No history of CAD, prior MI,  chest pain, or palpitations            Respiratory: No shortness of breath, asthma, copd, recurrent pneumonia            Hematologic: The patient reports no easy bruising, frequent gum bleeding or nose bleeding;  The patient has no history of known chronic anemia            Dermatologic: The patient reports no recent rashes or chronic skin disorders            Rheumatologic: +arthritis            Genitourinary:  The patient reports no history of recurrent urinary tract infections, hematuria, dysuria, or nephrolithiasis           Psychiatric: The patient reports no history of depression, anxiety, suicidal ideation, or homicidal ideation           Oncologic: The patient reports on history of prior solid tumor or hematologic malignancy           ENT: The patient reports no hoarseness of voice, hearing loss, sinus congestion, tinnitus           Neurologic: Parkinson's disease, dementia    PE: /68 (BP Location: Left arm)   Pulse 78   Temp 97.5 °F (36.4 °C) (Oral)   Resp 18   Ht 5' 3\" (1.6 m)   Wt 138 lb 14.2 oz (63 kg)   SpO2 99%   BMI 24.60 kg/m²   Gen: Lethargic  HENT: NCAT, EOMI, oropharynx is clear with moist mucosal membranes  Eyes: Sclerae are anicteric  Neck:  Supple without nuchal rigidity  CV: Regular rate and rhythm, with normal S1 and S2  Resp: No increased respiratory effort  Abdomen: Soft, non-tender, mildly distended (but appears that with breathing she at times tenses abdominal muscles) with the presence of bowel sounds; no rebound or guarding; No ascites is clinically apparent; no tympany to percussion  Ext: No peripheral edema or cyanosis  Skin: Warm and dry  Psychiatric: Appropriate mood and congruent affect without obvious depression or anxiety  Labs:   Lab Results   Component Value Date    CREATSERUM 0.62 11/17/2024    BUN 11 11/17/2024     11/17/2024    K 3.7 11/17/2024     11/17/2024    CO2 27.0 11/17/2024     11/17/2024    CA 8.6 11/17/2024    MG 2.0 11/17/2024     PHOS 2.7 11/17/2024     Recent Labs   Lab 11/15/24  1633 11/16/24  0502 11/17/24  0538   GLU 87 103* 114*   BUN 21 15 11   CREATSERUM 0.78 0.64 0.62   CA 9.5 8.9 8.6*    140 135*   K 4.5 3.7 3.7    109 107   CO2 30.0 26.0 27.0     Recent Labs   Lab 11/15/24  1633 11/16/24  0501   RBC 4.92 4.29   HGB 14.5 12.5   HCT 44.5 39.2   MCV 90.4 91.4   MCH 29.5 29.1   MCHC 32.6 31.9   RDW 14.6 14.4   NEPRELIM 4.49  --    WBC 8.6 7.0   .0 226.0       Recent Labs   Lab 11/15/24  1633 11/16/24  0502   ALT <7* <7*   AST 18 16       Imaging:   CXR:  Impression   CONCLUSION:  Enteric tube terminates in the stomach.           Impression:   Failure to thrive  Parkinson's disease/dementia    Recommendations:   C diff, Stool PCR  Awaiting abdominal X ray result: as long as this looks ok, continue tube feeds  Consider PEG on Tuesday depending on above and how she tolerates tube feeds at goal  They have declined palliative care consult    Spoke with patient's son Dr. Marinellil Jimena (who is an internist), who is POA. The potentially life-threatening complications of infection, sedation, bleeding, and perforation were reviewed along with the possible need for surgical management, transfusions, or repeat endoscopy should one of these complications arise. He understands and is agreeable.               Thank you for the consultation, we will follow the patient with you.    Lillian Bui DO  11:29 AM  11/17/2024  [facility]  [phone]             [1] No Known Allergies

## 2024-11-17 NOTE — PROGRESS NOTES
[facility]   part of [facility]     Hospitalist Progress Note     Sultana Hess Patient Status:  Inpatient    1935 MRN [id number]   Location [facility] 3NW-A Attending Parul Falk MD   Hosp Day # 2 PCP Aubrey Tsai     Chief Complaint: FTT    Subjective:     dobhoff placed, having diarrhea and abdomen distended    Objective:    Review of Systems:   Unable to obtain     Vital signs:  Temp:  [97.5 °F (36.4 °C)-98.4 °F (36.9 °C)] 97.5 °F (36.4 °C)  Pulse:  [72-82] 78  Resp:  [18-20] 18  BP: (121-141)/(47-68) 141/68  SpO2:  [97 %-99 %] 99 %    Physical Exam:    General: No acute distress, not responsive, contracted, malnourished appearing   Respiratory: No wheezes, no rhonchi  Cardiovascular: S1, S2, regular rate and rhythm  Abdomen: distended  Neuro: not responsive   Extremities: No edema      Diagnostic Data:    Labs:  Recent Labs   Lab 11/15/24  1633 24  0501   WBC 8.6 7.0   HGB 14.5 12.5   MCV 90.4 91.4   .0 226.0       Recent Labs   Lab 11/15/24  1633 24  0502 24  0538   GLU 87 103* 114*   BUN 21 15 11   CREATSERUM 0.78 0.64 0.62   CA 9.5 8.9 8.6*   ALB 3.9 3.0*  --     140 135*   K 4.5 3.7 3.7    109 107   CO2 30.0 26.0 27.0   ALKPHO 96 75  --    AST 18 16  --    ALT <7* <7*  --    BILT 0.3 0.4  --    TP 7.6 6.8  --        Estimated Creatinine Clearance: 52.9 mL/min (based on SCr of 0.62 mg/dL).    No results for input(s): \"TROP\", \"TROPHS\", \"CK\" in the last 168 hours.    No results for input(s): \"PTP\", \"INR\" in the last 168 hours.               Microbiology    No results found for this visit on 11/15/24.      Imaging: Reviewed in Epic.    Medications:    enoxaparin  40 mg Subcutaneous Nightly    amLODIPine  5 mg Per NG Tube Daily    carbidopa-levodopa  2 tablet Per NG Tube TID    famoTIDine  20 mg Per NG Tube BID    metoprolol tartrate  25 mg Per NG Tube 2x Daily(Beta Blocker)       Assessment & Plan:      #Failure to thrive/Severe Malnutrition    #Advanced dementia and Parkinson's Disease   -nonverbal/nondecisional/nonambulatory and contracted at baseline  -do not advise PEG but family would like to proceed, Son and daughter are physicians   -trend labs - monitor for refeeding syndrome     #Abdominal distension and diarrhea   -likely due to tube feeds, stop for now, when re-initiated probably need to titrate slower - would NOT use reglan given Parkinson's   -stop laxatives    #Acute encephalopathy 2/2 not being able to take her Sinemet likely  -Dobbhoff in place for meds until PEG    #Parkinson's Disease  -Sinemet     #Hypertension  -Amlodipine   -Metoprolol    #Depression  #GERD      Ghazal Pride, DO    Supplementary Documentation:     Quality:  DVT Mechanical Prophylaxis:     Early ambuation  DVT Pharmacologic Prophylaxis   Medication    enoxaparin (Lovenox) 40 MG/0.4ML SUBQ injection 40 mg                Code Status: DNAR/Selective Treatment  [place]: No urinary catheter in place      The 21st Century Cures Act makes medical notes like these available to patients in the interest of transparency. Please be advised this is a medical document. Medical documents are intended to carry relevant information, facts as evident, and the clinical opinion of the practitioner. The medical note is intended as peer to peer communication and may appear blunt or direct. It is written in medical language and may contain abbreviations or verbiage that are unfamiliar.     Dietitian Malnutrition Assessment    Evaluation for Malnutrition: Criteria for severe malnutrition diagnosis- chronic illness related to   Energy intake less than 75% for greater than 1 month., Muscle mass severe depletion.               RD Malnutrition Care Plan: Recommend EN (enteral nutrition) support if unable to take adequate PO safely within 1-2 days.    Body Fat/Muscle Mass:          Physician Assessment     Patient has a diagnosis of severe malnutrition        **Certification      PHYSICIAN  Certification of Need for Inpatient Hospitalization - Initial Certification    Patient will require inpatient services that will reasonably be expected to span two midnight's based on the clinical documentation in H+P.   Based on patients current state of illness, I anticipate that, after discharge, patient will require TBD.

## 2024-11-18 ENCOUNTER — APPOINTMENT (OUTPATIENT)
Dept: CT IMAGING | Facility: HOSPITAL | Age: 89
End: 2024-11-18
Attending: STUDENT IN AN ORGANIZED HEALTH CARE EDUCATION/TRAINING PROGRAM
Payer: MEDICARE

## 2024-11-18 ENCOUNTER — APPOINTMENT (OUTPATIENT)
Dept: CT IMAGING | Facility: HOSPITAL | Age: 89
DRG: 641 | End: 2024-11-18
Attending: STUDENT IN AN ORGANIZED HEALTH CARE EDUCATION/TRAINING PROGRAM
Payer: MEDICARE

## 2024-11-18 LAB
ALBUMIN SERPL-MCNC: 3.3 G/DL (ref 3.2–4.8)
ALBUMIN/GLOB SERPL: 1.1 {RATIO} (ref 1–2)
ALP LIVER SERPL-CCNC: 85 U/L
ALT SERPL-CCNC: <7 U/L
ANION GAP SERPL CALC-SCNC: 3 MMOL/L (ref 0–18)
AST SERPL-CCNC: 16 U/L (ref ?–34)
BASOPHILS # BLD AUTO: 0.01 X10(3) UL (ref 0–0.2)
BASOPHILS NFR BLD AUTO: 0.1 %
BILIRUB SERPL-MCNC: 0.3 MG/DL (ref 0.2–1.1)
BUN BLD-MCNC: 10 MG/DL (ref 9–23)
CALCIUM BLD-MCNC: 8.5 MG/DL (ref 8.7–10.4)
CHLORIDE SERPL-SCNC: 102 MMOL/L (ref 98–112)
CO2 SERPL-SCNC: 27 MMOL/L (ref 21–32)
CREAT BLD-MCNC: 0.61 MG/DL
EGFRCR SERPLBLD CKD-EPI 2021: 86 ML/MIN/1.73M2 (ref 60–?)
EOSINOPHIL # BLD AUTO: 0.13 X10(3) UL (ref 0–0.7)
EOSINOPHIL NFR BLD AUTO: 1.7 %
ERYTHROCYTE [DISTWIDTH] IN BLOOD BY AUTOMATED COUNT: 13.9 %
GLOBULIN PLAS-MCNC: 3.1 G/DL (ref 2–3.5)
GLUCOSE BLD-MCNC: 113 MG/DL (ref 70–99)
GLUCOSE BLD-MCNC: 117 MG/DL (ref 70–99)
GLUCOSE BLD-MCNC: 121 MG/DL (ref 70–99)
GLUCOSE BLD-MCNC: 123 MG/DL (ref 70–99)
GLUCOSE BLD-MCNC: 125 MG/DL (ref 70–99)
HCT VFR BLD AUTO: 38.6 %
HGB BLD-MCNC: 13.1 G/DL
IMM GRANULOCYTES # BLD AUTO: 0.02 X10(3) UL (ref 0–1)
IMM GRANULOCYTES NFR BLD: 0.3 %
LYMPHOCYTES # BLD AUTO: 2.41 X10(3) UL (ref 1–4)
LYMPHOCYTES NFR BLD AUTO: 32.3 %
MAGNESIUM SERPL-MCNC: 1.6 MG/DL (ref 1.6–2.6)
MCH RBC QN AUTO: 30 PG (ref 26–34)
MCHC RBC AUTO-ENTMCNC: 33.9 G/DL (ref 31–37)
MCV RBC AUTO: 88.5 FL
MONOCYTES # BLD AUTO: 0.6 X10(3) UL (ref 0.1–1)
MONOCYTES NFR BLD AUTO: 8 %
NEUTROPHILS # BLD AUTO: 4.29 X10 (3) UL (ref 1.5–7.7)
NEUTROPHILS # BLD AUTO: 4.29 X10(3) UL (ref 1.5–7.7)
NEUTROPHILS NFR BLD AUTO: 57.6 %
OSMOLALITY SERPL CALC.SUM OF ELEC: 274 MOSM/KG (ref 275–295)
PHOSPHATE SERPL-MCNC: 2.7 MG/DL (ref 2.4–5.1)
PLATELET # BLD AUTO: 227 10(3)UL (ref 150–450)
POTASSIUM SERPL-SCNC: 4 MMOL/L (ref 3.5–5.1)
PROT SERPL-MCNC: 6.4 G/DL (ref 5.7–8.2)
RBC # BLD AUTO: 4.36 X10(6)UL
SODIUM SERPL-SCNC: 132 MMOL/L (ref 136–145)
WBC # BLD AUTO: 7.5 X10(3) UL (ref 4–11)

## 2024-11-18 PROCEDURE — 74177 CT ABD & PELVIS W/CONTRAST: CPT | Performed by: STUDENT IN AN ORGANIZED HEALTH CARE EDUCATION/TRAINING PROGRAM

## 2024-11-18 PROCEDURE — 99232 SBSQ HOSP IP/OBS MODERATE 35: CPT | Performed by: INTERNAL MEDICINE

## 2024-11-18 RX ORDER — MAGNESIUM SULFATE HEPTAHYDRATE 40 MG/ML
2 INJECTION, SOLUTION INTRAVENOUS ONCE
Status: COMPLETED | OUTPATIENT
Start: 2024-11-18 | End: 2024-11-18

## 2024-11-18 NOTE — PROGRESS NOTES
[facility]  GASTROENTEROLOGY PROGRESS NOTE   [facility]     Sultana Hess Patient Status:  Inpatient    1935 MRN [id number]   Location [facility] 3NW-A Attending Parul Falk MD   Hosp Day # 3 PCP Aubrey Tsai       Reason for Consultation:   Failure to thrive     Subjective:   Pt non verbal  Pt denies any abd pain  I spoke to her son/Jimenez GIBBS via phone today    Patient Active Problem List   Diagnosis    Dyslipidemia    Chronic GERD    IFG (impaired fasting glucose)    Bilateral primary osteoarthritis of knee    OAB (overactive bladder)    H/O total knee replacement    Benign essential HTN    Osteopenia of multiple sites    Alzheimer's disease, unspecified (HCC)    Hyponatremia    Hyperglycemia    Leukocytosis    Azotemia    Lethargy    Acute cystitis without hematuria    Encephalopathy acute    Dementia due to Parkinson's 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)    Leukocytosis, unspecified type    Urinary tract infection without hematuria, site unspecified    Calculus of bile duct with acute cholecystitis without obstruction    Cholecystitis    S/P laparoscopic cholecystectomy    Subcutaneous emphysema (HCC)    Postoperative bile leak    Abnormal CT of the abdomen    Parkinson's disease, unspecified whether dyskinesia present, unspecified whether manifestations fluctuate (HCC)    Failure to thrive in adult       PMH, PSH, Fhx, Shx as per initial consult note    Review of Systems    12 point Review of Systems negative unless otherwise mentioned in Subjective.     Physical Exam  /52 (BP Location: Right arm)   Pulse 83   Temp 98.8 °F (37.1 °C) (Axillary)   Resp 26   Ht 5' 3\" (1.6 m)   Wt 138 lb 14.2 oz (63 kg)   SpO2 96%   BMI 24.60 kg/m²   Body mass index is 24.6 kg/m².      Gen: No acute distress  HEENT: NGT in place  Resp: no respiratory distress  Abd: Soft, non-tender, non-distended. No rebound tenderness, no guarding.   Neuro: non verbal    Diagnostic  Data:      Labs:  Recent Labs   Lab 11/15/24  1633 11/16/24  0501 11/18/24  0514   WBC 8.6 7.0 7.5   HGB 14.5 12.5 13.1   MCV 90.4 91.4 88.5   .0 226.0 227.0       Recent Labs   Lab 11/15/24  1633 11/16/24  0502 11/17/24  0538 11/18/24  0514   GLU 87 103* 114* 121*   BUN 21 15 11 10   CREATSERUM 0.78 0.64 0.62 0.61   CA 9.5 8.9 8.6* 8.5*   ALB 3.9 3.0*  --  3.3    140 135* 132*   K 4.5 3.7 3.7 4.0    109 107 102   CO2 30.0 26.0 27.0 27.0   ALKPHO 96 75  --  85   AST 18 16  --  16   ALT <7* <7*  --  <7*   BILT 0.3 0.4  --  0.3   TP 7.6 6.8  --  6.4       No results for input(s): \"PTP\", \"INR\" in the last 168 hours.    No data recorded        Imaging: Imaging data reviewed in Epic.    Medications:    enoxaparin  40 mg Subcutaneous Nightly    amLODIPine  5 mg Per NG Tube Daily    carbidopa-levodopa  2 tablet Per NG Tube TID    famoTIDine  20 mg Per NG Tube BID    metoprolol tartrate  25 mg Per NG Tube 2x Daily(Beta Blocker)       Allergies:  Allergies[1]    Imaging:  I have personally reviewed all pertinent available imaging.     Narrative   PROCEDURE:  XR ABDOMEN (1 VIEW) (CPT=74018)     INDICATIONS:  distension     COMPARISON:  None.     TECHNIQUE:  Supine AP view was obtained.     PATIENT STATED HISTORY: (As transcribed by Technologist)  Patient offered no additional history at this time.            FINDINGS:    There is small and large bowel gaseous distension with large volume of stool projecting over the rectum.  Enteric feeding tube terminates in the right upper quadrant, which may be located within the gastric antrum versus proximal duodenum.  Findings may  be related to ileus versus obstruction, if there is continued clinical concern, CT evaluation is recommended.                   Impression   CONCLUSION:  See above        LOCATION:  [facility]        Dictated by (CST): Jones Barlow MD on 11/17/2024 at 1:38 PM      Finalized by (CST): Jones Barlow MD on 11/17/2024 at 1:39 PM            ASSESSMENT / PLAN:     Sultana Hess is a 89 year old female with GI consult for FTT, and evaluation for PEG.     At this time, XRAY with possible ileus. Therefore, no plan for endoscopy at this time, given air insufflation.   Will pursue CT A/P   Hold on any tube feeds for now, until resolution  I d/w patient's POA/son, Jimenez - we discussed that a PEG tube may not necessarily improve mortality.     Recommendations:   IVF, analgesia, anti-emetics per primary team  Hold on TF feeds for now; npo  NGT to LIWS  Tap water enemas, given stool/rectum findings.   CT A/P  No plan for endoscopy with PEG at this time, given possible ileus      Kory Espinoza MD  [facility]               [1] No Known Allergies

## 2024-11-18 NOTE — PROGRESS NOTES
Assumed care of patient at 1530. Patient nonverbal, contracted, hx of dementia, parkinson's. RA, bedbound, on clinitron bed. Incontinent. Patient having large, liquid Bms. Mepilex to sacrum intact. Voiding. Tube feeds restarted per GI. IVF infusing per mar. Patient NPO. Q2 turns. Pillow support provided. SCDs on. Plan of care discussed w/ family at bedside.     1645: Patient transported to CT.    1857: GI notified of CT scan results. Ok to resume tube feeds per GI. Orders given to start tap water enemas q6 hours.

## 2024-11-18 NOTE — PROGRESS NOTES
Alert & oriented x0, nonverbal, contracted. VSS on room air. Briefed, incontinent. Jevity 1.5 going at 45/hr. Pt dependent, Q2 turns. Sacral mepilex replaced x2. LISBET nausea/chest pain/SOB. LISBET pain status. Patient's family updated on plan of care. Questions and concerns addressed. Safety precautions in place. Frequent rounds performed.    1230: per GI, stop dubhoff feeds as xray shows possible ileus. CT a/p ordered.

## 2024-11-18 NOTE — OCCUPATIONAL THERAPY NOTE
OT order received, chart reviewed, pt is dep A for all ADLs and mobility with no skilled needs at SNF.  OT will sign off.

## 2024-11-18 NOTE — PROGRESS NOTES
Pt is non verbal.   Patient is contracted on all extremities. Needs help moving and being repositioned. On a clintron bed.   Duboff in place for tube feedings. Aspiration precautions in place.   Safety measures in place.

## 2024-11-18 NOTE — PROGRESS NOTES
[facility]   part of [facility]     Hospitalist Progress Note     Sultana Hess Patient Status:  Inpatient    1935 MRN [id number]   Location [facility] 3NW-A Attending Parul Falk MD   Hosp Day # 3 PCP Aubrey Tsai     Chief Complaint: FTT    Subjective:     Tube feeds remain on hold due to abdominal distension     Objective:    Review of Systems:   Unable to obtain     Vital signs:  Temp:  [97.5 °F (36.4 °C)-99.1 °F (37.3 °C)] 99.1 °F (37.3 °C)  Pulse:  [78-83] 83  Resp:  [16-18] 18  BP: (103-141)/(55-68) 121/66  SpO2:  [96 %-99 %] 96 %    Physical Exam:    General: No acute distress, not responsive, contracted, malnourished appearing   Respiratory: No wheezes, no rhonchi  Cardiovascular: S1, S2, regular rate and rhythm  Abdomen: distended  Neuro: not responsive   Extremities: No edema      Diagnostic Data:    Labs:  Recent Labs   Lab 11/15/24  1633 24  0501 24  0514   WBC 8.6 7.0 7.5   HGB 14.5 12.5 13.1   MCV 90.4 91.4 88.5   .0 226.0 227.0       Recent Labs   Lab 11/15/24  1633 24  0502 24  0538 24  0514   GLU 87 103* 114* 121*   BUN 21 15 11 10   CREATSERUM 0.78 0.64 0.62 0.61   CA 9.5 8.9 8.6* 8.5*   ALB 3.9 3.0*  --  3.3    140 135* 132*   K 4.5 3.7 3.7 4.0    109 107 102   CO2 30.0 26.0 27.0 27.0   ALKPHO 96 75  --  85   AST 18 16  --  16   ALT <7* <7*  --  <7*   BILT 0.3 0.4  --  0.3   TP 7.6 6.8  --  6.4       Estimated Creatinine Clearance: 53.7 mL/min (based on SCr of 0.61 mg/dL).    No results for input(s): \"TROP\", \"TROPHS\", \"CK\" in the last 168 hours.    No results for input(s): \"PTP\", \"INR\" in the last 168 hours.         Microbiology    No results found for this visit on 11/15/24.      Imaging: Reviewed in Epic.    Medications:    enoxaparin  40 mg Subcutaneous Nightly    amLODIPine  5 mg Per NG Tube Daily    carbidopa-levodopa  2 tablet Per NG Tube TID    famoTIDine  20 mg Per NG Tube BID    metoprolol tartrate  25 mg Per  NG Tube 2x Daily(Beta Blocker)       Assessment & Plan:      #Failure to thrive/Severe Malnutrition   #Advanced dementia and Parkinson's Disease   -nonverbal/nondecisional/nonambulatory and contracted at baseline  -plan for PEG per family request -  son and daughter are physicians   -trend labs - monitor for refeeding syndrome when able to start    #Abdominal distension and diarrhea   -likely due to tube feeds but possible Ileus on imaging, TF held - would NOT use reglan given Parkinson's   -Cdiff, GI PCR negative     #Acute encephalopathy 2/2 not being able to take her Sinemet likely  -Dobbhoff in place for meds until PEG    #Parkinson's Disease  -Sinemet     #Hypertension  -Amlodipine   -Metoprolol    #Depression  #GERD      Ghazal Pride,     Supplementary Documentation:     Quality:  DVT Mechanical Prophylaxis:     Early ambuation  DVT Pharmacologic Prophylaxis   Medication    enoxaparin (Lovenox) 40 MG/0.4ML SUBQ injection 40 mg                Code Status: DNAR/Selective Treatment  Landaverde: No urinary catheter in place      The 21st Century Cures Act makes medical notes like these available to patients in the interest of transparency. Please be advised this is a medical document. Medical documents are intended to carry relevant information, facts as evident, and the clinical opinion of the practitioner. The medical note is intended as peer to peer communication and may appear blunt or direct. It is written in medical language and may contain abbreviations or verbiage that are unfamiliar.     Dietitian Malnutrition Assessment    Evaluation for Malnutrition: Criteria for severe malnutrition diagnosis- chronic illness related to   Energy intake less than 75% for greater than 1 month., Muscle mass severe depletion.               RD Malnutrition Care Plan: Recommend EN (enteral nutrition) support if unable to take adequate PO safely within 1-2 days.    Body Fat/Muscle Mass:          Physician Assessment     Patient  has a diagnosis of severe malnutrition        **Certification      PHYSICIAN Certification of Need for Inpatient Hospitalization - Initial Certification    Patient will require inpatient services that will reasonably be expected to span two midnight's based on the clinical documentation in H+P.   Based on patients current state of illness, I anticipate that, after discharge, patient will require TBD.

## 2024-11-19 LAB
ANION GAP SERPL CALC-SCNC: 3 MMOL/L (ref 0–18)
BUN BLD-MCNC: 8 MG/DL (ref 9–23)
CALCIUM BLD-MCNC: 8.8 MG/DL (ref 8.7–10.4)
CHLORIDE SERPL-SCNC: 105 MMOL/L (ref 98–112)
CO2 SERPL-SCNC: 27 MMOL/L (ref 21–32)
CREAT BLD-MCNC: 0.6 MG/DL
EGFRCR SERPLBLD CKD-EPI 2021: 87 ML/MIN/1.73M2 (ref 60–?)
GLUCOSE BLD-MCNC: 128 MG/DL (ref 70–99)
GLUCOSE BLD-MCNC: 130 MG/DL (ref 70–99)
GLUCOSE BLD-MCNC: 134 MG/DL (ref 70–99)
GLUCOSE BLD-MCNC: 134 MG/DL (ref 70–99)
GLUCOSE BLD-MCNC: 99 MG/DL (ref 70–99)
MAGNESIUM SERPL-MCNC: 1.9 MG/DL (ref 1.6–2.6)
OSMOLALITY SERPL CALC.SUM OF ELEC: 280 MOSM/KG (ref 275–295)
PHOSPHATE SERPL-MCNC: 3 MG/DL (ref 2.4–5.1)
POTASSIUM SERPL-SCNC: 3.9 MMOL/L (ref 3.5–5.1)
SODIUM SERPL-SCNC: 135 MMOL/L (ref 136–145)

## 2024-11-19 PROCEDURE — 99232 SBSQ HOSP IP/OBS MODERATE 35: CPT | Performed by: HOSPITALIST

## 2024-11-19 NOTE — CM/SW NOTE
CM acknowledges order for POLST completion. Pt/family completed POLST prior to admission. Current POLST reflects pt's current DNAR/Select status. Copy on pt's physical chart.     CM/SW will remain available for DC planning and/or support.     EMILIANA BarrettN, CMSRN    [id number]

## 2024-11-19 NOTE — PROGRESS NOTES
[facility]   part of [facility]     Hospitalist Progress Note     Sultana Hess Patient Status:  Inpatient    1935 MRN [id number]   Location [facility] 3NW-A Attending Parul Falk MD   Hosp Day # 4 PCP Aubrey Tsai     Chief Complaint: FTT    Subjective:     Tube feeds resumed. 3 BM's since last night    Objective:    Review of Systems:   Unable to obtain     Vital signs:  Temp:  [97.4 °F (36.3 °C)-99.6 °F (37.6 °C)] 99.6 °F (37.6 °C)  Pulse:  [68-90] 90  Resp:  [16-18] 16  BP: (107-123)/(57-65) 123/65  SpO2:  [95 %-100 %] 96 %    Physical Exam:    General: No acute distress, not responsive, contracted, malnourished appearing   Respiratory: No wheezes, no rhonchi  Cardiovascular: S1, S2, regular rate and rhythm  Abdomen: distended  Neuro: not responsive   Extremities: No edema      Diagnostic Data:    Labs:  Recent Labs   Lab 11/15/24  1633 24  0501 24  0514   WBC 8.6 7.0 7.5   HGB 14.5 12.5 13.1   MCV 90.4 91.4 88.5   .0 226.0 227.0       Recent Labs   Lab 11/15/24  1633 24  0502 24  0538 24  0514 24  0550   GLU 87 103* 114* 121* 134*   BUN 21 15 11 10 8*   CREATSERUM 0.78 0.64 0.62 0.61 0.60   CA 9.5 8.9 8.6* 8.5* 8.8   ALB 3.9 3.0*  --  3.3  --     140 135* 132* 135*   K 4.5 3.7 3.7 4.0 3.9    109 107 102 105   CO2 30.0 26.0 27.0 27.0 27.0   ALKPHO 96 75  --  85  --    AST 18 16  --  16  --    ALT <7* <7*  --  <7*  --    BILT 0.3 0.4  --  0.3  --    TP 7.6 6.8  --  6.4  --        Estimated Creatinine Clearance: 54.6 mL/min (based on SCr of 0.6 mg/dL).    No results for input(s): \"TROP\", \"TROPHS\", \"CK\" in the last 168 hours.    No results for input(s): \"PTP\", \"INR\" in the last 168 hours.         Microbiology    No results found for this visit on 11/15/24.      Imaging: Reviewed in Epic.    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 NPO at MN     #Acute encephalopathy 2/2 not being able to take her Sinemet likely  -Dobbhoff in place for meds until PEG    #Parkinson's Disease  -Sinemet     #Hypertension  -Amlodipine   -Metoprolol    #Depression  #GERD    D/w dtr at bedside    Josué Oswald MD    Supplementary Documentation:     Quality:  DVT Mechanical Prophylaxis:     Early ambuation  DVT Pharmacologic Prophylaxis   Medication    enoxaparin (Lovenox) 40 MG/0.4ML SUBQ injection 40 mg                Code Status: DNAR/Selective Treatment  Landaverde: No urinary catheter in place      The 21st Century Cures Act makes medical notes like these available to patients in the interest of transparency. Please be advised this is a medical document. Medical documents are intended to carry relevant information, facts as evident, and the clinical opinion of the practitioner. The medical note is intended as peer to peer communication and may appear blunt or direct. It is written in medical language and may contain abbreviations or verbiage that are unfamiliar.     Dietitian Malnutrition Assessment    Evaluation for Malnutrition: Criteria for severe malnutrition diagnosis- chronic illness related to   Energy intake less than 75% for greater than 1 month., Muscle mass severe depletion.               RD Malnutrition Care Plan: Recommend EN  (enteral nutrition) support if unable to take adequate PO safely within 1-2 days.    Body Fat/Muscle Mass:          Physician Assessment     Patient has a diagnosis of severe malnutrition        **Certification      PHYSICIAN Certification of Need for Inpatient Hospitalization - Initial Certification    Patient will require inpatient services that will reasonably be expected to span two midnight's based on the clinical documentation in H+P.   Based on patients current state of illness, I anticipate that, after discharge, patient will require TBD.

## 2024-11-19 NOTE — PLAN OF CARE
Problem: RISK FOR INFECTION - ADULT  Goal: Absence of fever/infection during anticipated neutropenic period  Description: INTERVENTIONS  - Monitor WBC  - Administer growth factors as ordered  - Implement neutropenic guidelines  Outcome: Progressing     Problem: SAFETY ADULT - FALL  Goal: Free from fall injury  Description: INTERVENTIONS:  - Assess pt frequently for physical needs  - Identify cognitive and physical deficits and behaviors that affect risk of falls.  - [place] fall precautions as indicated by assessment.  - Educate pt/family on patient safety including physical limitations  - Instruct pt to call for assistance with activity based on assessment  - Modify environment to reduce risk of injury  - Provide assistive devices as appropriate  - Consider OT/PT consult to assist with strengthening/mobility  - Encourage toileting schedule  Outcome: Progressing     Problem: DISCHARGE PLANNING  Goal: Discharge to home or other facility with appropriate resources  Description: INTERVENTIONS:  - Identify barriers to discharge w/pt and caregiver  - Include patient/family/discharge partner in discharge planning  - Arrange for needed discharge resources and transportation as appropriate  - Identify discharge learning needs (meds, wound care, etc)  - Arrange for interpreters to assist at discharge as needed  - Consider post-discharge preferences of patient/family/discharge partner  - Complete POLST form as appropriate  - Assess patient's ability to be responsible for managing their own health  - Refer to Case Management Department for coordinating discharge planning if the patient needs post-hospital services based on physician/LIP order or complex needs related to functional status, cognitive ability or social support system  Outcome: Progressing     Problem: SKIN/TISSUE INTEGRITY - ADULT  Goal: Skin integrity remains intact  Description: INTERVENTIONS  - Assess and document risk factors for pressure ulcer  development  - Assess and document skin integrity  - Monitor for areas of redness and/or skin breakdown  - Initiate interventions, skin care algorithm/standards of care as needed  Outcome: Progressing     Problem: Impaired Swallowing  Goal: Minimize aspiration risk  Description: Interventions:  - Patient should be alert and upright for all feedings (90 degrees preferred)  - Offer food and liquids at a slow rate  - No straws  - Encourage small bites of food and small sips of liquid  - Offer pills one at a time, crush or deliver with applesauce as needed  - Discontinue feeding and notify MD (or speech pathologist) if coughing or persistent throat clearing or wet/gurgly vocal quality is noted  Outcome: Progressing     Problem: GASTROINTESTINAL - ADULT  Goal: Minimal or absence of nausea and vomiting  Description: INTERVENTIONS:  - Maintain adequate hydration with IV or PO as ordered and tolerated  - Nasogastric tube to low intermittent suction as ordered  - Evaluate effectiveness of ordered antiemetic medications  - Provide nonpharmacologic comfort measures as appropriate  - Advance diet as tolerated, if ordered  - Obtain nutritional consult as needed  - Evaluate fluid balance  Outcome: Progressing  Goal: Maintains or returns to baseline bowel function  Description: INTERVENTIONS:  - Assess bowel function  - Maintain adequate hydration with IV or PO as ordered and tolerated  - Evaluate effectiveness of GI medications  - Encourage mobilization and activity  - Obtain nutritional consult as needed  - Establish a toileting routine/schedule  - Consider collaborating with pharmacy to review patient's medication profile  Outcome: Progressing  Goal: Maintains adequate nutritional intake (undernourished)  Description: INTERVENTIONS:  - Monitor percentage of each meal consumed  - Identify factors contributing to decreased intake, treat as appropriate  - Assist with meals as needed  - Monitor I&O, WT and lab values  - Obtain  nutritional consult as needed  - Optimize oral hygiene and moisture  - Encourage food from home; allow for food preferences  - Enhance eating environment  Outcome: Progressing  Goal: Achieves appropriate nutritional intake (bariatric)  Description: INTERVENTIONS:  - Monitor for over-consumption  - Identify factors contributing to increased intake, treat as appropriate  - Monitor I&O, WT and lab values  - Obtain nutritional consult as needed  - Evaluate psychosocial factors contributing to over-consumption  Outcome: Progressing

## 2024-11-19 NOTE — PROGRESS NOTES
[facility]  GASTROENTEROLOGY PROGRESS NOTE   [facility]     Sultana Hess Patient Status:  Inpatient    1935 MRN [id number]   Location [facility] 3NW-A Attending Parul Falk MD   Hosp Day # 4 PCP Aubrey Tsai       Reason for Consultation:   Failure to thrive   Poor PO intake    Subjective:   Pt non verbal  D/w RN - had enemas overnight- 2 large Bms;   No overt GI bleeding      Patient Active Problem List   Diagnosis    Dyslipidemia    Chronic GERD    IFG (impaired fasting glucose)    Bilateral primary osteoarthritis of knee    OAB (overactive bladder)    H/O total knee replacement    Benign essential HTN    Osteopenia of multiple sites    Alzheimer's disease, unspecified (HCC)    Hyponatremia    Hyperglycemia    Leukocytosis    Azotemia    Lethargy    Acute cystitis without hematuria    Encephalopathy acute    Dementia due to Parkinson's 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)    Leukocytosis, unspecified type    Urinary tract infection without hematuria, site unspecified    Calculus of bile duct with acute cholecystitis without obstruction    Cholecystitis    S/P laparoscopic cholecystectomy    Subcutaneous emphysema (HCC)    Postoperative bile leak    Abnormal CT of the abdomen    Parkinson's disease, unspecified whether dyskinesia present, unspecified whether manifestations fluctuate (HCC)    Failure to thrive in adult       PMH, PSH, Fhx, Shx as per initial consult note    Review of Systems    Patient non verbal - unable to obtain     Physical Exam  /57 (BP Location: Left arm)   Pulse 71   Temp 97.6 °F (36.4 °C) (Axillary)   Resp 18   Ht 5' 3\" (1.6 m)   Wt 138 lb 14.2 oz (63 kg)   SpO2 95%   BMI 24.60 kg/m²   Body mass index is 24.6 kg/m².      Gen: No acute distress  HEENT: NGT in place, TF running   Resp: no respiratory distress  Abd: Soft, non-tender, non-distended. No rebound tenderness, no guarding.   Neuro: non verbal    Diagnostic Data:       Labs:  Recent Labs   Lab 11/15/24  1633 11/16/24  0501 11/18/24  0514   WBC 8.6 7.0 7.5   HGB 14.5 12.5 13.1   MCV 90.4 91.4 88.5   .0 226.0 227.0       Recent Labs   Lab 11/15/24  1633 11/16/24  0502 11/17/24  0538 11/18/24  0514 11/19/24  0550   GLU 87 103* 114* 121* 134*   BUN 21 15 11 10 8*   CREATSERUM 0.78 0.64 0.62 0.61 0.60   CA 9.5 8.9 8.6* 8.5* 8.8   ALB 3.9 3.0*  --  3.3  --     140 135* 132* 135*   K 4.5 3.7 3.7 4.0 3.9    109 107 102 105   CO2 30.0 26.0 27.0 27.0 27.0   ALKPHO 96 75  --  85  --    AST 18 16  --  16  --    ALT <7* <7*  --  <7*  --    BILT 0.3 0.4  --  0.3  --    TP 7.6 6.8  --  6.4  --        No results for input(s): \"PTP\", \"INR\" in the last 168 hours.    No data recorded        Imaging: Imaging data reviewed in Epic.    Medications:    enoxaparin  40 mg Subcutaneous Nightly    amLODIPine  5 mg Per NG Tube Daily    carbidopa-levodopa  2 tablet Per NG Tube TID    famoTIDine  20 mg Per NG Tube BID    metoprolol tartrate  25 mg Per NG Tube 2x Daily(Beta Blocker)       Allergies:  Allergies[1]    Imaging:  I have personally reviewed all pertinent available imaging.     Narrative   PROCEDURE:  XR ABDOMEN (1 VIEW) (CPT=74018)     INDICATIONS:  distension     COMPARISON:  None.     TECHNIQUE:  Supine AP view was obtained.     PATIENT STATED HISTORY: (As transcribed by Technologist)  Patient offered no additional history at this time.            FINDINGS:    There is small and large bowel gaseous distension with large volume of stool projecting over the rectum.  Enteric feeding tube terminates in the right upper quadrant, which may be located within the gastric antrum versus proximal duodenum.  Findings may  be related to ileus versus obstruction, if there is continued clinical concern, CT evaluation is recommended.                   Impression   CONCLUSION:  See above        LOCATION:  [facility]        Dictated by (CST): Jones Barlow MD on 11/17/2024 at 1:38 PM       Finalized by (CST): Jones Barlow MD on 11/17/2024 at 1:39 PM         Impression   CONCLUSION:    1. Large amount of fecal material noted within the rectum suggestive of fecal impaction.  No evidence of bowel obstruction.  2. Small hiatal hernia.          LOCATION:  [facility]        Dictated by (CST): Jeniffer Courtney MD on 11/18/2024 at 5:29 PM      Finalized by (CST): Jeniffer Courtney MD on 11/18/2024 at 5:34 PM        PEG informed consent  Informed Consent:  I called and spoke to patient's medical Power of  (POA),  Jimenez.  The planned procedure(s) -  esophagogastroduodenoscopy (EGD) with possible endotherapy and PEG (percutaneous endoscopic gastrostomy) feeding tube insertion, the explanation of the procedure, its expected benefits, the potential complications and risks and possible alternatives and their benefits and risks were discussed with the  patient's POA. The discussion of risks, not limited to but including bleeding more than expected from Gastric tube attempt, insertion/placement, infection, perforation of GI tract, outside of intended area of gastric tube placement, such as the colon or small bowel, esophagus,  adverse effects from anesthesia (including heart attack, stroke, cardiac arrest, arrhythmia, respiratory failure) and possible prolonged hospitalization/emergency surgery, and risk of missed lesion(s) and higher risk of morbidity and mortality/death were discussed with patient's surrogate/ POA. Complications from Gastric tube include infection, persistent bleeding, pulled G tube, or clogged G tube.   Patient's POA understood the proposed procedure(s), its risks, benefits and alternatives and wish to proceed with procedure(s). All questions answered in full.        ASSESSMENT / PLAN:     Sultana Hess is a 89 year old female with GI consult for FTT, and evaluation for PEG.     CT with large amount of fecal material  - fecal impaction resolved.   Enemas - Large Bms following enema  No  bowel obstruction on CT     Plan for EGD w/ PEG evaluation, tentatively tomorrow     Recommendations:    Plan for EGD w/ PEG tomorrow - informed consent as above  OK to continue Tube feeds, nutrition consult   Hold TF at MN; NPO  IVF, analgesia, anti-emetics per primary team      Kory Espinoza MD  [facility]               [1] No Known Allergies

## 2024-11-19 NOTE — PLAN OF CARE
Patient resting in bed. Non-verbal. Dubhoff secured at 58cm. Tube feedings increased to 40cc's. Lovenox on hold tonight for Peg Tube placement tomorrow. Clinitron bed. Turning Q2. Enemas discontinued per GI. All safety measures in place.

## 2024-11-19 NOTE — PROGRESS NOTES
Patient here from: [facility]  Neuro: Patient is non-verbal. Unable to assess orientation. Vital signs stable  Respiratory: On room air. Continuous pulse oximeter. Lung sounds:Diminished  Cardiac: Tele-NSR  GI: Abdomen rounded. Passing gas. Bowel sounds:active Last BM: 11/18  : incontinent- briefed  Integumentary: Mepilex on sacrum  Drains: none  Pain: controlled with PRN pain medications  Activity: Bedbound. Q2 turns  Diet: Dobbhoff tube feeding @ 10 ML/hr  IV Fluids: infusing per order.  All appropriate safety measures in place. All questions and concerns addressed.

## 2024-11-19 NOTE — PLAN OF CARE
Skin tear on buttocks is healing, new mepilex applied. Right heel with redness noted, bunny boots applied. Turning Q2 with repositioning.

## 2024-11-19 NOTE — DIETARY MALNUTRITION NOTE
[facility]   part of [facility]  NUTRITION ASSESSMENT    Pt meets severe malnutrition criteria at this time.    CRITERIA FOR MALNUTRITION DIAGNOSIS:  Criteria for severe malnutrition diagnosis: chronic illness related to energy intake less than 75% for greater than 1 month and muscle mass severe depletion    NUTRITION INTERVENTION:    RD nutrition Care Plan- Recommend EN (enteral nutrition) support if unable to take adequate PO safely within 1-2 days  Meal and Snacks - NPO ADAT per SLP.  Enteral Nutrition - Via DHT vs PEG, recommend initiating Jevity 1.5 at 10 ml/hr and advancing 10 ml/hr q4hrs to GOAL: Jevity 1.5 at 45 ml/hr.   This will provide 1620 kcal, 69 grams protein, 821 ml total free water, and 100% of RDI's.   Recommend 130 ml water flush q 4 hours, TF+FWF provides 1601 ml total fluids.   Coordination of Nutrition Care - Recommend SLP consult prior to diet advancement. and Palliative care consult for goal of care    PATIENT STATUS: TF back on. No bowel obstruction on CT per GI. Plan for EGD w/ PEG possibly 11/20. Pt tolerating TF at 40mL/hr. Labs WNL. No new wt. +BM. On RA. Skin intact  11[birth year]16-89 year old female admitted on 11/15 presents with FTT and request for G-tube placement. Per report, family has been bringing in shakes for pt at NH but pt declining and not eating or drinking much lately. Pt screened d/t MST score 3 and consult for tube feeds. Noted pt with end stage dementia. Attempted to visit at bedside but no family present. Pt is nonverbal and not responsive to even sternal rub at this time. SLP attempted to eval but cannot given above - remains NPO. Per hospitalist note, PEG is not medically advised but family wishes to proceed anyway. Plan to place DHT until PEG can be placed. Pt with abd distention but no other GI symptoms noted. NKFA. Will start TF slowly as she is at risk of refeeding syndrome and continue to monitor lytes and tolerance.    PMH: end-stage dementia, Parkinson's  disease, right greater than left upper extremity spasticity, hypertension, depression, GERD     ANTHROPOMETRICS:  Ht: 160 cm (5' 3\")  Wt: 63 kg (138 lb 14.2 oz).   BMI: Body mass index is 24.6 kg/m².  IBW: 52.3 kg    WEIGHT HISTORY:   Patient Weight(s) for the past 336 hrs:   Weight   11/16/24 0118 63 kg (138 lb 14.2 oz)   11/15/24 1625 63 kg (138 lb 14.2 oz)       Wt Readings from Last 10 Encounters:   11/16/24 63 kg (138 lb 14.2 oz)   04/15/23 63.5 kg (139 lb 15.9 oz)   10/03/22 63.5 kg (140 lb)   09/16/22 65.7 kg (144 lb 13.5 oz)   04/10/22 70.3 kg (154 lb 15.7 oz)   01/14/22 70.3 kg (154 lb 15.7 oz)   12/01/21 70.3 kg (155 lb)   11/19/21 68 kg (150 lb)   04/09/21 70.3 kg (155 lb)   04/07/21 70.3 kg (155 lb)        NUTRITION:  Diet:       Procedures    NPO      Food Allergies: No  Cultural/Ethnic/Yazidi Preferences Addressed: Yes    Percent Meals Eaten (last 3 days)       Date/Time Percent Meals Eaten (%)    11/18/24 1145 0 %     Percent Meals Eaten (%): NPO at 11/18/24 1145    11/18/24 1340 0 %     Percent Meals Eaten (%): NPO at 11/18/24 1340    11/18/24 1802 0 %     Percent Meals Eaten (%): NPO at 11/18/24 1802            GI SYSTEM REVIEW: distention  Skin/Wounds: WNL    NUTRITION RELATED PHYSICAL FINDINGS:     1. Body Fat/Muscle Mass: severe muscle depletion Temple region and Clavicle region per visual exam     2. Fluid Accumulation: none per RN documentation    NUTRITION PRESCRIPTION:  63 kg Actual Body Weight  Calories: 6813-2744 calories/day (23-28 kcal/kg)  Protein: 63-95 grams protein/day (1.0-1.5 gm/kg)  Fluid: ~1 ml/kcal or per MD discretion    NUTRITION DIAGNOSIS/PROBLEM:  Malnutrition related to insufficient appetite resulting in inadequate nutrition intake as evidenced by documented/reported insufficient oral intake and loss of muscle mass    MONITOR AND EVALUATE/NUTRITION GOALS:  Weight stable within 1 to 2 lbs during admission - Ongoing  Start alternative nutrition in 24-48 hrs if diet is not  able to advance- Resolved  Tolerate alternative nutrition at 100% of goal - Met, continues      MEDICATIONS:  IV: LR @ 50ml, Pepcid, lactulose    LABS:  Na 135    Pt is at High nutrition risk    Karen Flores RD, LDN  Clinical Nutrition  [id number]

## 2024-11-20 ENCOUNTER — ANESTHESIA (OUTPATIENT)
Dept: ENDOSCOPY | Facility: HOSPITAL | Age: 89
End: 2024-11-20
Payer: MEDICARE

## 2024-11-20 ENCOUNTER — ANESTHESIA EVENT (OUTPATIENT)
Dept: ENDOSCOPY | Facility: HOSPITAL | Age: 89
End: 2024-11-20
Payer: MEDICARE

## 2024-11-20 LAB
ALBUMIN SERPL-MCNC: 3.2 G/DL (ref 3.2–4.8)
ALBUMIN/GLOB SERPL: 0.8 {RATIO} (ref 1–2)
ALP LIVER SERPL-CCNC: 78 U/L
ALT SERPL-CCNC: <7 U/L
ANION GAP SERPL CALC-SCNC: <0 MMOL/L (ref 0–18)
AST SERPL-CCNC: 22 U/L (ref ?–34)
BASOPHILS # BLD AUTO: 0.02 X10(3) UL (ref 0–0.2)
BASOPHILS NFR BLD AUTO: 0.2 %
BILIRUB SERPL-MCNC: 0.3 MG/DL (ref 0.2–1.1)
BUN BLD-MCNC: 8 MG/DL (ref 9–23)
CALCIUM BLD-MCNC: 8.9 MG/DL (ref 8.7–10.4)
CHLORIDE SERPL-SCNC: 104 MMOL/L (ref 98–112)
CO2 SERPL-SCNC: 27 MMOL/L (ref 21–32)
CREAT BLD-MCNC: 0.53 MG/DL
EGFRCR SERPLBLD CKD-EPI 2021: 89 ML/MIN/1.73M2 (ref 60–?)
EOSINOPHIL # BLD AUTO: 0.17 X10(3) UL (ref 0–0.7)
EOSINOPHIL NFR BLD AUTO: 2 %
ERYTHROCYTE [DISTWIDTH] IN BLOOD BY AUTOMATED COUNT: 14.1 %
GLOBULIN PLAS-MCNC: 3.8 G/DL (ref 2–3.5)
GLUCOSE BLD-MCNC: 105 MG/DL (ref 70–99)
GLUCOSE BLD-MCNC: 113 MG/DL (ref 70–99)
GLUCOSE BLD-MCNC: 122 MG/DL (ref 70–99)
GLUCOSE BLD-MCNC: 125 MG/DL (ref 70–99)
GLUCOSE BLD-MCNC: 97 MG/DL (ref 70–99)
HCT VFR BLD AUTO: 39.4 %
HGB BLD-MCNC: 13.2 G/DL
IMM GRANULOCYTES # BLD AUTO: 0.03 X10(3) UL (ref 0–1)
IMM GRANULOCYTES NFR BLD: 0.3 %
LYMPHOCYTES # BLD AUTO: 2.5 X10(3) UL (ref 1–4)
LYMPHOCYTES NFR BLD AUTO: 28.9 %
MAGNESIUM SERPL-MCNC: 1.6 MG/DL (ref 1.6–2.6)
MCH RBC QN AUTO: 29.7 PG (ref 26–34)
MCHC RBC AUTO-ENTMCNC: 33.5 G/DL (ref 31–37)
MCV RBC AUTO: 88.7 FL
MONOCYTES # BLD AUTO: 0.66 X10(3) UL (ref 0.1–1)
MONOCYTES NFR BLD AUTO: 7.6 %
NEUTROPHILS # BLD AUTO: 5.28 X10 (3) UL (ref 1.5–7.7)
NEUTROPHILS # BLD AUTO: 5.28 X10(3) UL (ref 1.5–7.7)
NEUTROPHILS NFR BLD AUTO: 61 %
OSMOLALITY SERPL CALC.SUM OF ELEC: 269 MOSM/KG (ref 275–295)
PHOSPHATE SERPL-MCNC: 2.9 MG/DL (ref 2.4–5.1)
PLATELET # BLD AUTO: 227 10(3)UL (ref 150–450)
POTASSIUM SERPL-SCNC: 3.9 MMOL/L (ref 3.5–5.1)
PROT SERPL-MCNC: 7 G/DL (ref 5.7–8.2)
RBC # BLD AUTO: 4.44 X10(6)UL
SODIUM SERPL-SCNC: 130 MMOL/L (ref 136–145)
WBC # BLD AUTO: 8.7 X10(3) UL (ref 4–11)

## 2024-11-20 PROCEDURE — 99232 SBSQ HOSP IP/OBS MODERATE 35: CPT | Performed by: HOSPITALIST

## 2024-11-20 PROCEDURE — 0DJ08ZZ INSPECTION OF UPPER INTESTINAL TRACT, VIA NATURAL OR ARTIFICIAL OPENING ENDOSCOPIC: ICD-10-PCS | Performed by: STUDENT IN AN ORGANIZED HEALTH CARE EDUCATION/TRAINING PROGRAM

## 2024-11-20 RX ORDER — SODIUM CHLORIDE, SODIUM LACTATE, POTASSIUM CHLORIDE, CALCIUM CHLORIDE 600; 310; 30; 20 MG/100ML; MG/100ML; MG/100ML; MG/100ML
INJECTION, SOLUTION INTRAVENOUS CONTINUOUS PRN
Status: DISCONTINUED | OUTPATIENT
Start: 2024-11-20 | End: 2024-11-20 | Stop reason: SURG

## 2024-11-20 RX ORDER — MAGNESIUM OXIDE 400 MG/1
400 TABLET ORAL ONCE
Status: COMPLETED | OUTPATIENT
Start: 2024-11-20 | End: 2024-11-20

## 2024-11-20 RX ORDER — SODIUM CHLORIDE, SODIUM LACTATE, POTASSIUM CHLORIDE, CALCIUM CHLORIDE 600; 310; 30; 20 MG/100ML; MG/100ML; MG/100ML; MG/100ML
INJECTION, SOLUTION INTRAVENOUS CONTINUOUS
Status: DISCONTINUED | OUTPATIENT
Start: 2024-11-20 | End: 2024-11-20 | Stop reason: HOSPADM

## 2024-11-20 RX ORDER — PHENYLEPHRINE HCL 10 MG/ML
VIAL (ML) INJECTION AS NEEDED
Status: DISCONTINUED | OUTPATIENT
Start: 2024-11-20 | End: 2024-11-20 | Stop reason: SURG

## 2024-11-20 RX ORDER — ONDANSETRON 2 MG/ML
4 INJECTION INTRAMUSCULAR; INTRAVENOUS EVERY 6 HOURS PRN
Status: DISCONTINUED | OUTPATIENT
Start: 2024-11-20 | End: 2024-11-20 | Stop reason: HOSPADM

## 2024-11-20 RX ORDER — METOCLOPRAMIDE HYDROCHLORIDE 5 MG/ML
10 INJECTION INTRAMUSCULAR; INTRAVENOUS EVERY 8 HOURS PRN
Status: DISCONTINUED | OUTPATIENT
Start: 2024-11-20 | End: 2024-11-20 | Stop reason: HOSPADM

## 2024-11-20 RX ORDER — NALOXONE HYDROCHLORIDE 0.4 MG/ML
0.08 INJECTION, SOLUTION INTRAMUSCULAR; INTRAVENOUS; SUBCUTANEOUS AS NEEDED
Status: DISCONTINUED | OUTPATIENT
Start: 2024-11-20 | End: 2024-11-20 | Stop reason: HOSPADM

## 2024-11-20 RX ADMIN — PHENYLEPHRINE HCL 50 MCG: 10 MG/ML VIAL (ML) INJECTION at 17:19:00

## 2024-11-20 RX ADMIN — SODIUM CHLORIDE, SODIUM LACTATE, POTASSIUM CHLORIDE, CALCIUM CHLORIDE: 600; 310; 30; 20 INJECTION, SOLUTION INTRAVENOUS at 17:28:00

## 2024-11-20 RX ADMIN — PHENYLEPHRINE HCL 50 MCG: 10 MG/ML VIAL (ML) INJECTION at 17:16:00

## 2024-11-20 RX ADMIN — SODIUM CHLORIDE, SODIUM LACTATE, POTASSIUM CHLORIDE, CALCIUM CHLORIDE: 600; 310; 30; 20 INJECTION, SOLUTION INTRAVENOUS at 17:08:00

## 2024-11-20 NOTE — ANESTHESIA POSTPROCEDURE EVALUATION
[facility] Patient Status:  Inpatient   Age/Gender 89 year old female MRN [id number]   Location [facility] POST ANESTHESIA CARE UNIT Attending Josué Oswald MD   Hosp Day # 5 PCP Aubrey Tsai       Anesthesia Post-op Note    ESOPHAGOGASTRODUODENOSCOPY (EGD)    Procedure Summary       Date: 11/20/24 Room / Location:  ENDOSCOPY 02 /  ENDOSCOPY    Anesthesia Start: 1707 Anesthesia Stop: 1728    Procedure: ESOPHAGOGASTRODUODENOSCOPY (EGD) Diagnosis:       Failure to thrive in adult      (Normal EGD)    Surgeons: Kory Espinoza MD Anesthesiologist: Erwin Desai MD    Anesthesia Type: MAC ASA Status: Not recorded            Anesthesia Type: MAC    Vitals Value Taken Time   /49 11/20/24 1728   Temp 97.9 °F (36.6 °C) 11/20/24 1728   Pulse 87 11/20/24 1728   Resp 17 11/20/24 1728   SpO2 96 % 11/20/24 1728   Vitals shown include unfiled device data.    Patient Location: PACU    Anesthesia Type: MAC    Airway Patency: patent    Postop Pain Control: adequate    Mental Status: mildly sedated but able to meaningfully participate in the post-anesthesia evaluation    Nausea/Vomiting: none    Cardiopulmonary/Hydration status: stable euvolemic    Complications: no apparent anesthesia related complications    Postop vital signs: stable    Dental Exam: Unchanged from Preop    Patient to be discharged from PACU when criteria met.

## 2024-11-20 NOTE — ANESTHESIA PREPROCEDURE EVALUATION
PRE-OP EVALUATION    Patient Name: Sultana Hess    Admit Diagnosis: Failure to thrive in adult [R62.7]  Parkinson's disease, unspecified whether dyskinesia present, unspecified whether manifestations fluctuate (HCC) [G20.A1]    Pre-op Diagnosis: Failure to thrive in adult [R62.7]    ESOPHAGOGASTRODUODENOSCOPY (EGD), PERCUTANEOUS ENDOSCOPIC GASTROSTOMY PLACEMENT/JEJUNOSTOMY TUBE PLACEMENT    Anesthesia Procedure: ESOPHAGOGASTRODUODENOSCOPY (EGD), PERCUTANEOUS ENDOSCOPIC GASTROSTOMY PLACEMENT/JEJUNOSTOMY TUBE PLACEMENT  PERCUTANEOUS ENDOSCOPIC GASTROSTOMY PLACEMENT/JEJUNOSTOMY TUBE PLACEMENT    Surgeons and Role:     * Kory Espinoza MD - Primary    Pre-op vitals reviewed.  Temp: 99.6 °F (37.6 °C)  Pulse: 102  Resp: 15  BP: 150/74  SpO2: 95 %  Body mass index is 24.6 kg/m².    Current medications reviewed.  Hospital Medications:   [COMPLETED] magnesium sulfate in sterile water for injection 2 g/50mL IVPB premix 2 g  2 g Intravenous Once    [COMPLETED] iopamidol 76% (ISOVUE-370) injection for power injector  80 mL Intravenous ONCE PRN    enoxaparin (Lovenox) 40 MG/0.4ML SUBQ injection 40 mg  40 mg Subcutaneous Nightly    lactated ringers infusion   Intravenous Continuous    [COMPLETED] magnesium sulfate in sterile water for injection 2 g/50mL IVPB premix 2 g  2 g Intravenous Once    dextrose 10% infusion (TPN no rate)   Intravenous Continuous PRN    pancrelipase (Lip-Prot-Amyl) (Zenpep) DR particles cap 10,000 Units  10,000 Units Per G Tube PRN    And    sodium bicarbonate tab 325 mg  325 mg Per G Tube PRN    acetaminophen (Tylenol) 160 MG/5ML oral liquid 500 mg  500 mg Per NG Tube Q6H PRN    Or    acetaminophen (Tylenol) 160 MG/5ML oral liquid 1,000 mg  1,000 mg Per NG Tube Q6H PRN    amLODIPine (Norvasc) 1mg/ml oral suspension 5 mg  5 mg Per NG Tube Daily    carbidopa-levodopa (SINEMET)  MG per tab 2 tablet  2 tablet Per NG Tube TID    famoTIDine (Pepcid) 40 MG/5ML oral suspension 20 mg  20 mg Per NG Tube  BID    melatonin tab 3 mg  3 mg Per NG Tube Nightly PRN    metoprolol tartrate (Lopressor) 10 mg/mL oral suspension 25 mg  25 mg Per NG Tube 2x Daily(Beta Blocker)    polyethylene glycol (PEG 3350) (Miralax) 17 g oral packet 17 g  17 g Per NG Tube Daily PRN    senna (Senokot) 8.8 MG/5ML oral syrup 17.6 mg  10 mL Per NG Tube BID PRN    [] dextrose 5%-sodium chloride 0.45% infusion   Intravenous Continuous    bisacodyl (Dulcolax) 10 MG rectal suppository 10 mg  10 mg Rectal Daily PRN    fleet enema (Fleet) rectal enema 133 mL  1 enema Rectal Once PRN    ondansetron (Zofran) 4 MG/2ML injection 4 mg  4 mg Intravenous Q6H PRN       Outpatient Medications:   Prescriptions Prior to Admission[1]    Allergies: Patient has no known allergies.      Anesthesia Evaluation        Anesthetic Complications           GI/Hepatic/Renal  Comment: ileus    (+) GERD                           Cardiovascular  Comment: TTE :  1. Left ventricle: The cavity size was normal. Wall thickness was normal.      Systolic function was hyperdynamic. The estimated ejection fraction was      75-80%. There was dynamic obstruction, with mid-cavity obliteration. No      diagnostic evidence for regional wall motion abnormalities. Doppler      parameters are consistent with abnormal left ventricular relaxation -      grade 1 diastolic dysfunction.   2. Aortic valve: Trileaflet; mildly calcified leaflets.   3. Mitral valve: Mildly calcified annulus.   4. Left atrium: The left atrial volume was normal.   5. Right ventricle: Systolic pressure was mildly increased.   6. Pulmonary arteries: Systolic pressure was mildly increased, estimated to      be 39mm Hg.                   (+) hypertension                                     Endo/Other                                  Pulmonary                           Neuro/Psych  Comment: parkinson's    (+) depression                                Past Surgical History:   Procedure Laterality Date    Knee  replacement surgery Bilateral            Social History     Socioeconomic History    Marital status:    Tobacco Use    Smoking status: Never    Smokeless tobacco: Never   Vaping Use    Vaping status: Never Used   Substance and Sexual Activity    Alcohol use: No    Drug use: No   Other Topics Concern    Caffeine Concern Yes     Comment: 1 cup of tea daily    Exercise No     History   Drug Use No     Available pre-op labs reviewed.  Lab Results   Component Value Date    WBC 8.7 2024    RBC 4.44 2024    HGB 13.2 2024    HCT 39.4 2024    MCV 88.7 2024    MCH 29.7 2024    MCHC 33.5 2024    RDW 14.1 2024    .0 2024     Lab Results   Component Value Date     (L) 2024    K 3.9 2024     2024    CO2 27.0 2024    BUN 8 (L) 2024    CREATSERUM 0.53 (L) 2024     (H) 2024    CA 8.9 2024            Airway      Mallampati: unable to assess       Cardiovascular      Rhythm: regular  Rate: normal     Dental    Dentition appears grossly intact         Pulmonary      Breath sounds clear to auscultation bilaterally.               Other findings              ASA: 3   Plan: MAC  NPO status verified and patient meets guidelines.    Post-procedure pain management plan discussed with surgeon and patient.      Plan/risks discussed with: patient and child/children (spoke with daughters)                Present on Admission:  **None**             [1]   Medications Prior to Admission   Medication Sig Dispense Refill Last Dose/Taking    lactulose 20 GM/30ML Oral Solution Take 30 mL (20 g total) by mouth every morning.   11/15/2024 at  9:00 AM    hydrocortisone 1 % External Cream Apply 1 Application topically daily as needed (dermatitis).   Past Week    Loratadine 10 MG Oral Cap Take 10 mg by mouth daily.   Past Month    polyethylene glycol, PEG 3350, 17 g Oral Powd Pack Take 17 g by mouth daily.   11/15/2024 at   9:00 AM    amLODIPine 5 MG Oral Tab Take 1 tablet (5 mg total) by mouth daily.  0 11/15/2024 at  9:00 AM    sennosides 8.6 MG Oral Tab Take 2 tablets (17.2 mg total) by mouth 2 (two) times daily as needed for constipation.   Past Week    carbidopa-levodopa  MG Oral Tab Take 2 tablets by mouth 3 (three) times daily.   11/15/2024 at  1:00 PM    Metoprolol Succinate ER 50 MG Oral Tablet 24 Hr Take 1 tablet (50 mg total) by mouth once daily.  3 11/15/2024 at  9:00 AM    famoTIDine (PEPCID) 20 MG Oral Tab 1 tablet (20 mg total)  in the morning and 1 tablet (20 mg total) before bedtime.  5 11/15/2024 at  9:00 AM    acetaminophen 325 MG Oral Tab Take 2 tablets (650 mg total) by mouth every 6 (six) hours as needed for Pain.

## 2024-11-20 NOTE — PROGRESS NOTES
[facility]   part of [facility]     Hospitalist Progress Note     Sultana Hess Patient Status:  Inpatient    1935 MRN [id number]   Location [facility] 3NW-A Attending Parul Falk MD   Hosp Day # 5 PCP Aubrey Tsai     Chief Complaint: FTT    Subjective:     Having BM's. Plan PEG today. Eyes closed today     Objective:    Review of Systems:   Unable to obtain     Vital signs:  Temp:  [98.8 °F (37.1 °C)-99.6 °F (37.6 °C)] 98.8 °F (37.1 °C)  Pulse:  [] 86  Resp:  [14-22] 20  BP: (123-150)/(54-74) 128/63  SpO2:  [94 %-96 %] 95 %    Physical Exam:    General: No acute distress, not responsive, contracted, malnourished appearing   Respiratory: No wheezes, no rhonchi  Cardiovascular: S1, S2, regular rate and rhythm  Abdomen: distended  Neuro: not responsive   Extremities: No edema      Diagnostic Data:    Labs:  Recent Labs   Lab 11/15/24  1633 24  0501 24  0514 24  0459   WBC 8.6 7.0 7.5 8.7   HGB 14.5 12.5 13.1 13.2   MCV 90.4 91.4 88.5 88.7   .0 226.0 227.0 227.0       Recent Labs   Lab 24  0502 24  0538 24  0514 24  0550 24  0459   *   < > 121* 134* 105*   BUN 15   < > 10 8* 8*   CREATSERUM 0.64   < > 0.61 0.60 0.53*   CA 8.9   < > 8.5* 8.8 8.9   ALB 3.0*  --  3.3  --  3.2      < > 132* 135* 130*   K 3.7   < > 4.0 3.9 3.9      < > 102 105 104   CO2 26.0   < > 27.0 27.0 27.0   ALKPHO 75  --  85  --  78   AST 16  --  16  --  22   ALT <7*  --  <7*  --  <7*   BILT 0.4  --  0.3  --  0.3   TP 6.8  --  6.4  --  7.0    < > = values in this interval not displayed.       Estimated Creatinine Clearance: 61.9 mL/min (A) (based on SCr of 0.53 mg/dL (L)).    No results for input(s): \"TROP\", \"TROPHS\", \"CK\" in the last 168 hours.    No results for input(s): \"PTP\", \"INR\" in the last 168 hours.         Microbiology    No results found for this visit on 11/15/24.      Imaging: Reviewed in Epic.    Medications:    enoxaparin  40  mg Subcutaneous Nightly    amLODIPine  5 mg Per NG Tube Daily    carbidopa-levodopa  2 tablet Per NG Tube TID    famoTIDine  20 mg Per NG Tube BID    metoprolol tartrate  25 mg Per NG Tube 2x Daily(Beta Blocker)       Assessment & Plan:      #Failure to thrive/Severe Malnutrition   #Advanced dementia and Parkinson's Disease   -nonverbal/nondecisional/nonambulatory and contracted at baseline  -plan for PEG per family request -  son and daughter are physicians   -trend labs - monitor for refeeding syndrome when able to start  -PEG today. Hopefully can leave in dobhoff to give cinemet until can use peg    #Abdominal distension and diarrhea - ileus  -likely due to tube feeds but possible Ileus on imaging, TF held - would NOT use reglan given Parkinson's   -Cdiff, GI PCR negative   -d/w GI  -resume TF's. Held for peg    #Acute encephalopathy 2/2 not being able to take her Sinemet likely  -Dobbhoff in place for meds until PEG  -hopefully will improve once gets back on sinemet regularly     #Parkinson's Disease  -Sinemet     #Hypertension  -Amlodipine   -Metoprolol    #Depression  #GERD    Josué Oswald MD    Supplementary Documentation:     Quality:  DVT Mechanical Prophylaxis:     Early ambuation  DVT Pharmacologic Prophylaxis   Medication    enoxaparin (Lovenox) 40 MG/0.4ML SUBQ injection 40 mg                Code Status: DNAR/Selective Treatment  Landaverde: No urinary catheter in place      The 21st Century Cures Act makes medical notes like these available to patients in the interest of transparency. Please be advised this is a medical document. Medical documents are intended to carry relevant information, facts as evident, and the clinical opinion of the practitioner. The medical note is intended as peer to peer communication and may appear blunt or direct. It is written in medical language and may contain abbreviations or verbiage that are unfamiliar.     Dietitian Malnutrition Assessment    Evaluation for Malnutrition:  Criteria for severe malnutrition diagnosis- chronic illness related to   Energy intake less than 75% for greater than 1 month., Muscle mass severe depletion.               RD Malnutrition Care Plan: Recommend EN (enteral nutrition) support if unable to take adequate PO safely within 1-2 days.    Body Fat/Muscle Mass:          Physician Assessment     Patient has a diagnosis of severe malnutrition        **Certification      PHYSICIAN Certification of Need for Inpatient Hospitalization - Initial Certification    Patient will require inpatient services that will reasonably be expected to span two midnight's based on the clinical documentation in H+P.   Based on patients current state of illness, I anticipate that, after discharge, patient will require TBD.

## 2024-11-20 NOTE — CM/SW NOTE
Pt getting PEG today.  Pt will return to [facility] with TF when ready for dc.

## 2024-11-20 NOTE — PLAN OF CARE
Unable to assess pts orientation, assumed 0-1, vital signs stable on room air. Opens eyes to speech or light touch. Incontinent of bowel and bladder, contracted on both arms, rolls side to side. IVF infusing, wheezing noted. Dobhoff secured at 58 cm. Safety measures in place, questions addressed, plan of care discussed with patient.

## 2024-11-20 NOTE — OPERATIVE REPORT
Percutaneous Endoscopic Gastrostomy (PEG) Operative Report  Patient Name: Sultana Hess  Procedure: Esophagogastroduodenoscopy (EGD) - unable to place G tube  Indication: failure to thrive ; poor PO intake  Attending: Kory Espinoza M.D.  Consent:  Informed Consent  The planned procedure(s) - Esophagogastroduodenoscopy (EGD) with possible endotherapy and PEG (percutaneous endoscopic gastrostomy) feeding tube insertion, were discussed with the patient's POA , Jimenez,  with the the explanation of the procedure, its expected benefits, the potential complications and risks and possible alternatives and their benefits and risks were discussed with the  patient's POA. The discussion of risks, not limited to but including bleeding more than expected from Gastric tube attempt, insertion/placement, infection, perforation of GI tract, outside of intended area of gastric tube placement, such as the colon or small bowel, esophagus,  adverse effects from anesthesia (including heart attack, stroke, cardiac arrest, arrhythmia, respiratory failure) and possible prolonged hospitalization/emergency surgery, and risk of missed lesion(s) and higher risk of morbidity and mortality/death were discussed with patient's surrogate/ POA. Complications from Gastric tube including infection, persistent bleeding, pulled G tube, or clogged G tube also were discussed with POA.   Patient's POA understood the proposed procedure(s), its risks, benefits and alternatives and wish to proceed with procedure(s). All questions answered in full.      Sedation: Monitored Anesthesia Care throughout the duration of the procedure  Additional Medication:  Prophylactic antibiotics (2gm Ancef) was given prior to the procedure for primary prophylaxis  Monitoring:  Pulsoximetry, pulse, respirations, and blood pressure were monitored throughout the entire procedure  Procedure: After achieving adequate sedation and placing the patient in the left lateral decubitus  position, the lubricated upper endoscope was introduced into the mouth and advanced to the 2nd portion duodenum.  The endoscope was then withdrawn into the gastric antrum and placed in a retroflexed position, where the fundus and cardia were visualized.  The endoscope was righted, and withdrawn such that a full view of the gastric body was achieved.  The stomach was insufflated.  After 1:1 finger indentation, and corresponding transillumination was NOT achieved or visualized after evaluation in multiple locations of the stomach. - therefore, procedure aborted.  The patient left the procedure room in stable condition for recovery.  Findings:    Esophagus: The mucosa was normal.  The squamocolumnar junction / esophagogastric junction / diaphragmatic impression were appreciated at 40 cm from the incisors.   Stomach:  Normal visualized stomach. As above, unable to visualize appropriate window for G -tube placement due to poor transillumination. Therefore, procedure aborted - no attempt for G tube, incision.   Duodenum: normal visualized duodenum.     Impression: unable to place PEG as above.     Recommendations:  Consult IR for G tube placement  Continue tube feeds from GI standpoint  Watch for post-EGD precautions of bleeding, infection, perforation, abdominal pain or nausea.   CBC, BMP in a.m.    GI will signoff, please page with questions or concerns.   D/w patient's family, and POA/son, Jimenez    Kory Espinoza MD  [facility]

## 2024-11-21 LAB
GLUCOSE BLD-MCNC: 107 MG/DL (ref 70–99)
GLUCOSE BLD-MCNC: 128 MG/DL (ref 70–99)
GLUCOSE BLD-MCNC: 137 MG/DL (ref 70–99)
GLUCOSE BLD-MCNC: 98 MG/DL (ref 70–99)
INR BLD: 1.18 (ref 0.8–1.2)
MAGNESIUM SERPL-MCNC: 1.6 MG/DL (ref 1.6–2.6)
PROTHROMBIN TIME: 15.2 SECONDS (ref 11.6–14.8)

## 2024-11-21 PROCEDURE — 99232 SBSQ HOSP IP/OBS MODERATE 35: CPT | Performed by: HOSPITALIST

## 2024-11-21 RX ORDER — MAGNESIUM OXIDE 400 MG/1
400 TABLET ORAL ONCE
Status: COMPLETED | OUTPATIENT
Start: 2024-11-21 | End: 2024-11-21

## 2024-11-21 RX ORDER — MAGNESIUM OXIDE 400 MG/1
400 TABLET ORAL ONCE
Status: DISCONTINUED | OUTPATIENT
Start: 2024-11-21 | End: 2024-11-21

## 2024-11-21 NOTE — PROGRESS NOTES
Patient A&Ox0. Patient opens eyes only when turning for repositioning. Maximum assistance, contractures to all four extremities. Vital signs stable on room air. Abdomen soft, distended and rounded. Bowel sounds present; hypoactive. Patient not passing gas. No evidence of nausea present. Brief in place due to incontinence. Dobhoff present, secured at 58 cm. Family updated on plan of care.

## 2024-11-21 NOTE — PROGRESS NOTES
[facility]   part of [facility]     Hospitalist Progress Note     Sultana Hess Patient Status:  Inpatient    1935 MRN [id number]   Location [facility] 3NW-A Attending Parul Falk MD   Hosp Day # 6 PCP Aubrey Tsai     Chief Complaint: FTT    Subjective:     Eyes closed, less awake. Per family, they were with her earlier today and was more awake then.     Objective:    Review of Systems:   Unable to obtain     Vital signs:  Temp:  [97.9 °F (36.6 °C)-99.1 °F (37.3 °C)] 98.7 °F (37.1 °C)  Pulse:  [78-95] 78  Resp:  [13-18] 13  BP: ()/(44-52) 101/47  SpO2:  [91 %-98 %] 91 %    Physical Exam:    General: No acute distress, not responsive, contracted, malnourished appearing   Respiratory: No wheezes, no rhonchi  Cardiovascular: S1, S2, regular rate and rhythm  Abdomen: distended  Neuro: not responsive   Extremities: No edema      Diagnostic Data:    Labs:  Recent Labs   Lab 11/15/24  1633 24  0501 24  0514 24  0459 24  0655   WBC 8.6 7.0 7.5 8.7  --    HGB 14.5 12.5 13.1 13.2  --    MCV 90.4 91.4 88.5 88.7  --    .0 226.0 227.0 227.0  --    INR  --   --   --   --  1.18       Recent Labs   Lab 24  0502 24  0538 24  0514 24  0550 24  0459   *   < > 121* 134* 105*   BUN 15   < > 10 8* 8*   CREATSERUM 0.64   < > 0.61 0.60 0.53*   CA 8.9   < > 8.5* 8.8 8.9   ALB 3.0*  --  3.3  --  3.2      < > 132* 135* 130*   K 3.7   < > 4.0 3.9 3.9      < > 102 105 104   CO2 26.0   < > 27.0 27.0 27.0   ALKPHO 75  --  85  --  78   AST 16  --  16  --  22   ALT <7*  --  <7*  --  <7*   BILT 0.4  --  0.3  --  0.3   TP 6.8  --  6.4  --  7.0    < > = values in this interval not displayed.       Estimated Creatinine Clearance: 61.9 mL/min (A) (based on SCr of 0.53 mg/dL (L)).    No results for input(s): \"TROP\", \"TROPHS\", \"CK\" in the last 168 hours.    Recent Labs   Lab 24  0655   PTP 15.2*   INR 1.18             Microbiology    No results found for this visit on 11/15/24.      Imaging: Reviewed in Epic.    Medications:    magnesium oxide  400 mg Oral Once    Barium Sulfate  237 mL Oral Once    enoxaparin  40 mg Subcutaneous Nightly    amLODIPine  5 mg Per NG Tube Daily    carbidopa-levodopa  2 tablet Per NG Tube TID    famoTIDine  20 mg Per NG Tube BID    metoprolol tartrate  25 mg Per NG Tube 2x Daily(Beta Blocker)       Assessment & Plan:      #Failure to thrive/Severe Malnutrition   #Advanced dementia and Parkinson's Disease   -nonverbal/nondecisional/nonambulatory and contracted at baseline  -plan for PEG per family request -  son and daughter are physicians   -PEG by GI failed. IR to attempt tomorrow but seems unlikely  -TF resume, hold once barium given at 11 PM    #Abdominal distension and diarrhea - ileus  -likely due to tube feeds but possible Ileus on imaging, TF held - would NOT use reglan given Parkinson's   -Cdiff, GI PCR negative   -d/w GI  -resume TF's. Held for peg    #Acute encephalopathy 2/2 not being able to take her Sinemet likely  -Dobbhoff in place for meds until PEG  -not improving on sinemet   -I have some concerns she is less awake now compared to 2 days ago but per family, she was awake earlier today    #Parkinson's Disease  -Sinemet     #Hypertension  -Amlodipine   -Metoprolol    #Depression  #GERD    #GOC - attempted to discuss plan if PEG fails but son wishes to defer until attempt tomorrow. If PEG fails, I would recommend hospice at that time as there would be no nutrition options    Josué Oswald MD    Supplementary Documentation:     Quality:  DVT Mechanical Prophylaxis:     Early ambuation  DVT Pharmacologic Prophylaxis   Medication    enoxaparin (Lovenox) 40 MG/0.4ML SUBQ injection 40 mg                Code Status: DNAR/Selective Treatment  Landaverde: No urinary catheter in place      The 21st Century Cures Act makes medical notes like these available to patients in the interest of  transparency. Please be advised this is a medical document. Medical documents are intended to carry relevant information, facts as evident, and the clinical opinion of the practitioner. The medical note is intended as peer to peer communication and may appear blunt or direct. It is written in medical language and may contain abbreviations or verbiage that are unfamiliar.     Dietitian Malnutrition Assessment    Evaluation for Malnutrition: Criteria for severe malnutrition diagnosis- chronic illness related to   Energy intake less than 75% for greater than 1 month., Muscle mass severe depletion.               RD Malnutrition Care Plan: Recommend EN (enteral nutrition) support if unable to take adequate PO safely within 1-2 days.    Body Fat/Muscle Mass:          Physician Assessment     Patient has a diagnosis of severe malnutrition        **Certification      PHYSICIAN Certification of Need for Inpatient Hospitalization - Initial Certification    Patient will require inpatient services that will reasonably be expected to span two midnight's based on the clinical documentation in H+P.   Based on patients current state of illness, I anticipate that, after discharge, patient will require TBD.

## 2024-11-21 NOTE — PLAN OF CARE
Problem: RISK FOR INFECTION - ADULT  Goal: Absence of fever/infection during anticipated neutropenic period  Description: INTERVENTIONS  - Monitor WBC  - Administer growth factors as ordered  - Implement neutropenic guidelines  Outcome: Progressing     Problem: SAFETY ADULT - FALL  Goal: Free from fall injury  Description: INTERVENTIONS:  - Assess pt frequently for physical needs  - Identify cognitive and physical deficits and behaviors that affect risk of falls.  - [place] fall precautions as indicated by assessment.  - Educate pt/family on patient safety including physical limitations  - Instruct pt to call for assistance with activity based on assessment  - Modify environment to reduce risk of injury  - Provide assistive devices as appropriate  - Consider OT/PT consult to assist with strengthening/mobility  - Encourage toileting schedule  Outcome: Progressing     Problem: DISCHARGE PLANNING  Goal: Discharge to home or other facility with appropriate resources  Description: INTERVENTIONS:  - Identify barriers to discharge w/pt and caregiver  - Include patient/family/discharge partner in discharge planning  - Arrange for needed discharge resources and transportation as appropriate  - Identify discharge learning needs (meds, wound care, etc)  - Arrange for interpreters to assist at discharge as needed  - Consider post-discharge preferences of patient/family/discharge partner  - Complete POLST form as appropriate  - Assess patient's ability to be responsible for managing their own health  - Refer to Case Management Department for coordinating discharge planning if the patient needs post-hospital services based on physician/LIP order or complex needs related to functional status, cognitive ability or social support system  Outcome: Progressing     Problem: SKIN/TISSUE INTEGRITY - ADULT  Goal: Skin integrity remains intact  Description: INTERVENTIONS  - Assess and document risk factors for pressure ulcer  development  - Assess and document skin integrity  - Monitor for areas of redness and/or skin breakdown  - Initiate interventions, skin care algorithm/standards of care as needed  Outcome: Progressing     Problem: Impaired Swallowing  Goal: Minimize aspiration risk  Description: Interventions:  - Patient should be alert and upright for all feedings (90 degrees preferred)  - Offer food and liquids at a slow rate  - No straws  - Encourage small bites of food and small sips of liquid  - Offer pills one at a time, crush or deliver with applesauce as needed  - Discontinue feeding and notify MD (or speech pathologist) if coughing or persistent throat clearing or wet/gurgly vocal quality is noted  Outcome: Progressing     Problem: GASTROINTESTINAL - ADULT  Goal: Minimal or absence of nausea and vomiting  Description: INTERVENTIONS:  - Maintain adequate hydration with IV or PO as ordered and tolerated  - Nasogastric tube to low intermittent suction as ordered  - Evaluate effectiveness of ordered antiemetic medications  - Provide nonpharmacologic comfort measures as appropriate  - Advance diet as tolerated, if ordered  - Obtain nutritional consult as needed  - Evaluate fluid balance  Outcome: Progressing  Goal: Maintains or returns to baseline bowel function  Description: INTERVENTIONS:  - Assess bowel function  - Maintain adequate hydration with IV or PO as ordered and tolerated  - Evaluate effectiveness of GI medications  - Encourage mobilization and activity  - Obtain nutritional consult as needed  - Establish a toileting routine/schedule  - Consider collaborating with pharmacy to review patient's medication profile  Outcome: Progressing  Goal: Maintains adequate nutritional intake (undernourished)  Description: INTERVENTIONS:  - Monitor percentage of each meal consumed  - Identify factors contributing to decreased intake, treat as appropriate  - Assist with meals as needed  - Monitor I&O, WT and lab values  - Obtain  nutritional consult as needed  - Optimize oral hygiene and moisture  - Encourage food from home; allow for food preferences  - Enhance eating environment  Outcome: Progressing  Goal: Achieves appropriate nutritional intake (bariatric)  Description: INTERVENTIONS:  - Monitor for over-consumption  - Identify factors contributing to increased intake, treat as appropriate  - Monitor I&O, WT and lab values  - Obtain nutritional consult as needed  - Evaluate psychosocial factors contributing to over-consumption  Outcome: Progressing     Problem: Delirium  Goal: Minimize duration of delirium  Description: Interventions:  - Encourage use of hearing aids, eye glasses  - Promote highest level of mobility daily  - Provide frequent reorientation  - Promote wakefulness i.e. lights on, blinds open  - Promote sleep, encourage patient's normal rest cycle i.e. lights off, TV off, minimize noise and interruptions  - Encourage family to assist in orientation and promotion of home routines  Outcome: Progressing    The patient has stable vital signs, and she been turned and repositioned approximately every 2 to 3 hours today.

## 2024-11-21 NOTE — PLAN OF CARE
Patient alert and oriented x0, vital signs stable on room air. Maximum assist to roll side to side, incontinent of bowel and bladder. Unable to assess pain or nausea. IV fluids infusing, NPO. Safety measures in place, questions addressed, plan of care discussed with patient. 2 person skin check performed with Hailey AL, redness noted on front genital region, cream applied. Mepilex on sacrum over small healing blister. Bruise on left chest. Heels dry and flaky, in boots. Otherwise no breakdown noted.

## 2024-11-22 ENCOUNTER — APPOINTMENT (OUTPATIENT)
Dept: INTERVENTIONAL RADIOLOGY/VASCULAR | Facility: HOSPITAL | Age: 89
DRG: 641 | End: 2024-11-22
Attending: HOSPITALIST
Payer: MEDICARE

## 2024-11-22 LAB
ANION GAP SERPL CALC-SCNC: 5 MMOL/L (ref 0–18)
BASOPHILS # BLD AUTO: 0.03 X10(3) UL (ref 0–0.2)
BASOPHILS NFR BLD AUTO: 0.4 %
BUN BLD-MCNC: 6 MG/DL (ref 9–23)
CALCIUM BLD-MCNC: 8.9 MG/DL (ref 8.7–10.4)
CHLORIDE SERPL-SCNC: 103 MMOL/L (ref 98–112)
CO2 SERPL-SCNC: 26 MMOL/L (ref 21–32)
CREAT BLD-MCNC: 0.64 MG/DL
EGFRCR SERPLBLD CKD-EPI 2021: 85 ML/MIN/1.73M2 (ref 60–?)
EOSINOPHIL # BLD AUTO: 0.2 X10(3) UL (ref 0–0.7)
EOSINOPHIL NFR BLD AUTO: 2.9 %
ERYTHROCYTE [DISTWIDTH] IN BLOOD BY AUTOMATED COUNT: 14.3 %
GLUCOSE BLD-MCNC: 109 MG/DL (ref 70–99)
GLUCOSE BLD-MCNC: 117 MG/DL (ref 70–99)
GLUCOSE BLD-MCNC: 118 MG/DL (ref 70–99)
GLUCOSE BLD-MCNC: 129 MG/DL (ref 70–99)
GLUCOSE BLD-MCNC: 138 MG/DL (ref 70–99)
HCT VFR BLD AUTO: 38.6 %
HGB BLD-MCNC: 13 G/DL
IMM GRANULOCYTES # BLD AUTO: 0.03 X10(3) UL (ref 0–1)
IMM GRANULOCYTES NFR BLD: 0.4 %
LYMPHOCYTES # BLD AUTO: 2.53 X10(3) UL (ref 1–4)
LYMPHOCYTES NFR BLD AUTO: 36.9 %
MAGNESIUM SERPL-MCNC: 1.6 MG/DL (ref 1.6–2.6)
MCH RBC QN AUTO: 29.7 PG (ref 26–34)
MCHC RBC AUTO-ENTMCNC: 33.7 G/DL (ref 31–37)
MCV RBC AUTO: 88.3 FL
MONOCYTES # BLD AUTO: 0.68 X10(3) UL (ref 0.1–1)
MONOCYTES NFR BLD AUTO: 9.9 %
NEUTROPHILS # BLD AUTO: 3.38 X10 (3) UL (ref 1.5–7.7)
NEUTROPHILS # BLD AUTO: 3.38 X10(3) UL (ref 1.5–7.7)
NEUTROPHILS NFR BLD AUTO: 49.5 %
OSMOLALITY SERPL CALC.SUM OF ELEC: 277 MOSM/KG (ref 275–295)
PHOSPHATE SERPL-MCNC: 3.3 MG/DL (ref 2.4–5.1)
PLATELET # BLD AUTO: 245 10(3)UL (ref 150–450)
POTASSIUM SERPL-SCNC: 3.8 MMOL/L (ref 3.5–5.1)
RBC # BLD AUTO: 4.37 X10(6)UL
SODIUM SERPL-SCNC: 134 MMOL/L (ref 136–145)
WBC # BLD AUTO: 6.9 X10(3) UL (ref 4–11)

## 2024-11-22 PROCEDURE — BD12YZZ FLUOROSCOPY OF STOMACH USING OTHER CONTRAST: ICD-10-PCS | Performed by: RADIOLOGY

## 2024-11-22 PROCEDURE — 0DH63UZ INSERTION OF FEEDING DEVICE INTO STOMACH, PERCUTANEOUS APPROACH: ICD-10-PCS | Performed by: RADIOLOGY

## 2024-11-22 PROCEDURE — 3E0G76Z INTRODUCTION OF NUTRITIONAL SUBSTANCE INTO UPPER GI, VIA NATURAL OR ARTIFICIAL OPENING: ICD-10-PCS | Performed by: RADIOLOGY

## 2024-11-22 PROCEDURE — 99232 SBSQ HOSP IP/OBS MODERATE 35: CPT | Performed by: HOSPITALIST

## 2024-11-22 RX ORDER — LIDOCAINE HYDROCHLORIDE 10 MG/ML
INJECTION, SOLUTION INFILTRATION; PERINEURAL
Status: COMPLETED
Start: 2024-11-22 | End: 2024-11-22

## 2024-11-22 RX ORDER — IOPAMIDOL 612 MG/ML
30 INJECTION, SOLUTION INTRAVASCULAR
Status: COMPLETED | OUTPATIENT
Start: 2024-11-22 | End: 2024-11-22

## 2024-11-22 RX ORDER — MAGNESIUM SULFATE HEPTAHYDRATE 40 MG/ML
2 INJECTION, SOLUTION INTRAVENOUS ONCE
Status: COMPLETED | OUTPATIENT
Start: 2024-11-22 | End: 2024-11-22

## 2024-11-22 RX ORDER — MIDAZOLAM HYDROCHLORIDE 1 MG/ML
INJECTION INTRAMUSCULAR; INTRAVENOUS
Status: COMPLETED
Start: 2024-11-22 | End: 2024-11-22

## 2024-11-22 NOTE — PROCEDURES
[facility]   part of [facility]  Procedure Note    Sultana Hess Patient Status:  Inpatient    1935 MRN [id number]   Location [facility] INTERVENTIONAL SUITES Attending Josué Oswald MD   Hosp Day # 7 PCP Aubrey Tsai     Procedure: percutaneous gastrostomy    Pre-Procedure Diagnosis:  poor oral intake    Post-Procedure Diagnosis: same    Anesthesia:  Local and Sedation    Findings:  tip in stomach    Specimens: none    Blood Loss:  <10 ml    Tourniquet Time: n/a  Complications:  None  Drains:  16 Fr FAIZAN gastrostomy    Secondary Diagnosis:  none    Shiv Hinojosa MD  2024

## 2024-11-22 NOTE — CM/SW NOTE
CM updated by RN patient scheduled for IR PEG placement today at 2pm, will send updates to Liz via [employer] system.     Asmita Pino RN Case Manager [phone]

## 2024-11-22 NOTE — PROGRESS NOTES
A&Ox1. VSS. RA. .  GI: Abdomen soft, rounded  : Voids.  Pain controlled with PRN pain medications  Bedrest, turn q2h  Drains:Dobhoff at 58cm. Feedings stopped by previous shift for IR procedure.  Diet:NPO  IVF running per order.  All appropriate safety measures in place. All questions and concerns addressed. Will continue to monitor

## 2024-11-22 NOTE — PROGRESS NOTES
Patient alert and oriented x0. Patient opened eyes to speech. Contractures to all extremities. Heel protectors in place. Abdomen soft, distended, rounded. Hypoactive bowel sounds. Unable to assess pain. Dobhoff marked at 58 cm. Incontinent of bowel and bladder.

## 2024-11-22 NOTE — DIETARY MALNUTRITION NOTE
[facility]   part of [facility]  NUTRITION ASSESSMENT    Pt meets severe malnutrition criteria at this time.    CRITERIA FOR MALNUTRITION DIAGNOSIS:  Criteria for severe malnutrition diagnosis: chronic illness related to energy intake less than 75% for greater than 1 month and muscle mass severe depletion    NUTRITION INTERVENTION:    RD nutrition Care Plan- Recommend EN (enteral nutrition) support if unable to take adequate PO safely within 1-2 days  Meal and Snacks - NPO ADAT per SLP.  Enteral Nutrition - Via DHT vs PEG, recommend initiating Jevity 1.5 at 10 ml/hr and advancing 10 ml/hr q4hrs to GOAL: Jevity 1.5 at 45 ml/hr.   This will provide 1620 kcal, 69 grams protein, 821 ml total free water, and 100% of RDI's.   Recommend 130 ml water flush q 4 hours, TF+FWF provides 1601 ml total fluids.   Coordination of Nutrition Care - Recommend SLP consult prior to diet advancement. and Palliative care consult for goal of care    PATIENT STATUS: in bed, TF off this am. No family at bedside. Plan for PEG is afternoon. +BM today. No new wt. RD to follow.   11/19-TF back on. No bowel obstruction on CT per GI. Plan for EGD w/ PEG possibly 11/20. Pt tolerating TF at 40mL/hr. Labs WNL. No new wt. +BM. On RA. Skin intact  11/16-89 year old female admitted on 11/15 presents with FTT and request for G-tube placement. Per report, family has been bringing in shakes for pt at NH but pt declining and not eating or drinking much lately. Pt screened d/t MST score 3 and consult for tube feeds. Noted pt with end stage dementia. Attempted to visit at bedside but no family present. Pt is nonverbal and not responsive to even sternal rub at this time. SLP attempted to eval but cannot given above - remains NPO. Per hospitalist note, PEG is not medically advised but family wishes to proceed anyway. Plan to place DHT until PEG can be placed. Pt with abd distention but no other GI symptoms noted. NKFA. Will start TF slowly as she is  at risk of refeeding syndrome and continue to monitor lytes and tolerance.    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   BMI: Body mass index is 24.6 kg/m².  IBW: 52.3 kg    WEIGHT HISTORY:   Patient Weight(s) for the past 336 hrs:   Weight   11/16/24 0118 63 kg (138 lb 14.2 oz)   11/15/24 1625 63 kg (138 lb 14.2 oz)       Wt Readings from Last 10 Encounters:   11/16/24 63 kg (138 lb 14.2 oz)   04/15/23 63.5 kg (139 lb 15.9 oz)   10/03/22 63.5 kg (140 lb)   09/16/22 65.7 kg (144 lb 13.5 oz)   04/10/22 70.3 kg (154 lb 15.7 oz)   01/14/22 70.3 kg (154 lb 15.7 oz)   12/01/21 70.3 kg (155 lb)   11/19/21 68 kg (150 lb)   04/09/21 70.3 kg (155 lb)   04/07/21 70.3 kg (155 lb)        NUTRITION:  Diet:       Procedures    NPO      Food Allergies: No  Cultural/Ethnic/Rastafarian Preferences Addressed: Yes    Percent Meals Eaten (last 3 days)       None            GI SYSTEM REVIEW: distention  Skin/Wounds: WNL    NUTRITION RELATED PHYSICAL FINDINGS:     1. Body Fat/Muscle Mass: severe muscle depletion Temple region and Clavicle region per visual exam     2. Fluid Accumulation: none per RN documentation    NUTRITION PRESCRIPTION:  63 kg Actual Body Weight  Calories: 0680-8211 calories/day (23-28 kcal/kg)  Protein: 63-95 grams protein/day (1.0-1.5 gm/kg)  Fluid: ~1 ml/kcal or per MD discretion    NUTRITION DIAGNOSIS/PROBLEM:  Malnutrition related to insufficient appetite resulting in inadequate nutrition intake as evidenced by documented/reported insufficient oral intake and loss of muscle mass    MONITOR AND EVALUATE/NUTRITION GOALS:  Weight stable within 1 to 2 lbs during admission - Ongoing  Start alternative nutrition in 24-48 hrs if diet is not able to advance- Resolved  Tolerate alternative nutrition at 100% of goal - Met,  continues      MEDICATIONS:  Pepcid    LABS:  Na 134    Pt is at High nutrition risk    Karen Flores RD, LDN  Clinical Nutrition  [id number]

## 2024-11-22 NOTE — PROGRESS NOTES
[facility]   part of [facility]     Hospitalist Progress Note     Sultana Hess Patient Status:  Inpatient    1935 MRN [id number]   Location [facility] 3NW-A Attending Parul Falk MD   Hosp Day # 7 PCP Aubrey Tsai     Chief Complaint: FTT    Subjective:     Awake this AM w/ eyes open. Not following cmds    Objective:    Review of Systems:   Unable to obtain     Vital signs:  Temp:  [98.1 °F (36.7 °C)-98.8 °F (37.1 °C)] 98.8 °F (37.1 °C)  Pulse:  [75-90] 83  Resp:  [16-22] 22  BP: (112-126)/(56-66) 112/56  SpO2:  [93 %-96 %] 95 %    Physical Exam:    General: No acute distress, not responsive, contracted, malnourished appearing   Respiratory: No wheezes, no rhonchi  Cardiovascular: S1, S2, regular rate and rhythm  Abdomen: distended  Neuro: not responsive   Extremities: No edema      Diagnostic Data:    Labs:  Recent Labs   Lab 11/15/24  1633 24  0501 24  0514 24  0459 24  0655 24  0542   WBC 8.6 7.0 7.5 8.7  --  6.9   HGB 14.5 12.5 13.1 13.2  --  13.0   MCV 90.4 91.4 88.5 88.7  --  88.3   .0 226.0 227.0 227.0  --  245.0   INR  --   --   --   --  1.18  --        Recent Labs   Lab 24  0502 24  0538 24  0514 24  0550 24  0459 24  0542   *   < > 121* 134* 105* 118*   BUN 15   < > 10 8* 8* 6*   CREATSERUM 0.64   < > 0.61 0.60 0.53* 0.64   CA 8.9   < > 8.5* 8.8 8.9 8.9   ALB 3.0*  --  3.3  --  3.2  --       < > 132* 135* 130* 134*   K 3.7   < > 4.0 3.9 3.9 3.8      < > 102 105 104 103   CO2 26.0   < > 27.0 27.0 27.0 26.0   ALKPHO 75  --  85  --  78  --    AST 16  --  16  --  22  --    ALT <7*  --  <7*  --  <7*  --    BILT 0.4  --  0.3  --  0.3  --    TP 6.8  --  6.4  --  7.0  --     < > = values in this interval not displayed.       Estimated Creatinine Clearance: 51.2 mL/min (based on SCr of 0.64 mg/dL).    No results for input(s): \"TROP\", \"TROPHS\", \"CK\" in the last 168 hours.    Recent Labs   Lab  11/21/24  0655   PTP 15.2*   INR 1.18            Microbiology    No results found for this visit on 11/15/24.      Imaging: Reviewed in Epic.    Medications:    enoxaparin  40 mg Subcutaneous Nightly    amLODIPine  5 mg Per NG Tube Daily    carbidopa-levodopa  2 tablet Per NG Tube TID    famoTIDine  20 mg Per NG Tube BID    metoprolol tartrate  25 mg Per NG Tube 2x Daily(Beta Blocker)       Assessment & Plan:      #Failure to thrive/Severe Malnutrition   #Advanced dementia and Parkinson's Disease   -nonverbal/nondecisional/nonambulatory and contracted at baseline  -plan for PEG per family request -  son and daughter are physicians   -PEG by GI failed. IR to attempt today   -TF resume, hold once barium given at 11 PM    #Abdominal distension and diarrhea - ileus  -likely due to tube feeds but possible Ileus on imaging, TF held - would NOT use reglan given Parkinson's   -Cdiff, GI PCR negative   -d/w GI  -resume TF's. Held for peg    #Acute encephalopathy 2/2 not being able to take her Sinemet likely  -Dobbhoff in place for meds until PEG  -more awake today. Seems waxing and waning. Better in AM     #Parkinson's Disease  -Sinemet     #Hypertension  -Amlodipine   -Metoprolol    #Depression  #GERD    #GOC - attempted to discuss plan if PEG fails but son wishes to defer until attempt . If PEG fails, I would recommend hospice at that time as there would be no nutrition options    Josué Oswald MD    Supplementary Documentation:     Quality:  DVT Mechanical Prophylaxis:     Early ambuation  DVT Pharmacologic Prophylaxis   Medication    enoxaparin (Lovenox) 40 MG/0.4ML SUBQ injection 40 mg                Code Status: DNAR/Selective Treatment  Landaverde: No urinary catheter in place      The 21st Century Cures Act makes medical notes like these available to patients in the interest of transparency. Please be advised this is a medical document. Medical documents are intended to carry relevant information, facts as evident, and the  clinical opinion of the practitioner. The medical note is intended as peer to peer communication and may appear blunt or direct. It is written in medical language and may contain abbreviations or verbiage that are unfamiliar.     Dietitian Malnutrition Assessment    Evaluation for Malnutrition: Criteria for severe malnutrition diagnosis- chronic illness related to   Energy intake less than 75% for greater than 1 month., Muscle mass severe depletion.               RD Malnutrition Care Plan: Recommend EN (enteral nutrition) support if unable to take adequate PO safely within 1-2 days.    Body Fat/Muscle Mass:          Physician Assessment     Patient has a diagnosis of severe malnutrition        **Certification      PHYSICIAN Certification of Need for Inpatient Hospitalization - Initial Certification    Patient will require inpatient services that will reasonably be expected to span two midnight's based on the clinical documentation in H+P.   Based on patients current state of illness, I anticipate that, after discharge, patient will require TBD.

## 2024-11-23 LAB
GLUCOSE BLD-MCNC: 114 MG/DL (ref 70–99)
GLUCOSE BLD-MCNC: 134 MG/DL (ref 70–99)
GLUCOSE BLD-MCNC: 140 MG/DL (ref 70–99)
MAGNESIUM SERPL-MCNC: 1.9 MG/DL (ref 1.6–2.6)

## 2024-11-23 PROCEDURE — 99232 SBSQ HOSP IP/OBS MODERATE 35: CPT | Performed by: HOSPITALIST

## 2024-11-23 NOTE — CM/SW NOTE
CM noted successful G tube placement. Report from IR placement and Dietitian note with recommended tube feeding sent to pt's LTC facility.     CM/SW will remain available for DC planning and/or support.       EMILIANA BarrettN, CMSRN    [id number]

## 2024-11-23 NOTE — PLAN OF CARE
Pt here from: [facility]  Neuro: Unable to assess orientation. Pt is non-verbal. Pt opens her eyes to voices or pain.  VSS, RA, , No cough or SOB.    GI: Abdomen firm, PEG tube in place with split gauze, no drainage. Denies nausea.    : Voids into brief, pt is incontinent, family refusing purewick.   Pt is bed bound, max assist. She is contracted in all 4 extremities.   Diet: NPO.   IVF running per order through [facility].   All appropriate safety measures in place. All questions and concerns addressed. Bed locked and in lowest position. Call light in reach.   Tube feedings to start later this evening pending IR doc approval.     Paged Dr. Hinojosa. OK top start PEG tube feeding. Feeding started at 1735.

## 2024-11-23 NOTE — PROGRESS NOTES
Pt non verbal. RA. . VSS.   Dobhoff in place at 58cm.   Peg tube in place and attached to gravity per order.   [id number]- pt is bed bound.   Safety measures in place.

## 2024-11-23 NOTE — PROGRESS NOTES
[facility]   part of [facility]     Hospitalist Progress Note     Sultana Hess Patient Status:  Inpatient    1935 MRN [id number]   Location [facility] 3NW-A Attending Parul Falk MD   Hosp Day # 8 PCP Aubrey Tsai     Chief Complaint: FTT    Subjective:     Awake this AM w/ eyes open. Not following cmds.     Objective:    Review of Systems:   Unable to obtain     Vital signs:  Temp:  [98.1 °F (36.7 °C)-98.8 °F (37.1 °C)] 98.6 °F (37 °C)  Pulse:  [80-91] 84  Resp:  [18-22] 22  BP: (108-156)/(40-82) 156/76  SpO2:  [93 %-98 %] 98 %    Physical Exam:    General: No acute distress, not responsive, contracted, malnourished appearing   Respiratory: No wheezes, no rhonchi  Cardiovascular: S1, S2, regular rate and rhythm  Abdomen: distended  Neuro: not responsive   Extremities: No edema      Diagnostic Data:    Labs:  Recent Labs   Lab 24  0514 24  0459 24  0655 24  0542   WBC 7.5 8.7  --  6.9   HGB 13.1 13.2  --  13.0   MCV 88.5 88.7  --  88.3   .0 227.0  --  245.0   INR  --   --  1.18  --        Recent Labs   Lab 24  0514 24  0550 24  0459 24  0542   * 134* 105* 118*   BUN 10 8* 8* 6*   CREATSERUM 0.61 0.60 0.53* 0.64   CA 8.5* 8.8 8.9 8.9   ALB 3.3  --  3.2  --    * 135* 130* 134*   K 4.0 3.9 3.9 3.8    105 104 103   CO2 27.0 27.0 27.0 26.0   ALKPHO 85  --  78  --    AST 16  --  22  --    ALT <7*  --  <7*  --    BILT 0.3  --  0.3  --    TP 6.4  --  7.0  --        Estimated Creatinine Clearance: 51.2 mL/min (based on SCr of 0.64 mg/dL).    No results for input(s): \"TROP\", \"TROPHS\", \"CK\" in the last 168 hours.    Recent Labs   Lab 24  0655   PTP 15.2*   INR 1.18            Microbiology    No results found for this visit on 11/15/24.      Imaging: Reviewed in Epic.    Medications:    enoxaparin  40 mg Subcutaneous Nightly    amLODIPine  5 mg Per NG Tube Daily    carbidopa-levodopa  2 tablet Per NG Tube TID     famoTIDine  20 mg Per NG Tube BID    metoprolol tartrate  25 mg Per NG Tube 2x Daily(Beta Blocker)       Assessment & Plan:      #Failure to thrive/Severe Malnutrition   #Advanced dementia and Parkinson's Disease   -nonverbal/nondecisional/nonambulatory and contracted at baseline  -plan for PEG per family request -  son and daughter are physicians   -PEG successfully placed by IR  -TF to resume after 24 hrs post , around 5 PM today. Uptitrate to goal of 45cc/hr    #Abdominal distension and diarrhea - ileus  -likely due to tube feeds but possible Ileus on imaging, TF held - would NOT use reglan given Parkinson's   -Cdiff, GI PCR negative   -d/w GI  -resolved    #Acute encephalopathy 2/2 not being able to take her Sinemet likely  -Dobbhoff in place for meds until PEG  -more awake today. Seems waxing and waning. Better in AMs    #Parkinson's Disease  -Sinemet     #Hypertension  -Amlodipine   -Metoprolol    #Depression  #GERD    #dispo: plan DC tomorrow AM once TF to goal. D/w son and dtr    Josué Oswald MD    Supplementary Documentation:     Quality:  DVT Mechanical Prophylaxis:     Early ambuation  DVT Pharmacologic Prophylaxis   Medication    enoxaparin (Lovenox) 40 MG/0.4ML SUBQ injection 40 mg                Code Status: DNAR/Selective Treatment  Landaverde: No urinary catheter in place      The 21st Century Cures Act makes medical notes like these available to patients in the interest of transparency. Please be advised this is a medical document. Medical documents are intended to carry relevant information, facts as evident, and the clinical opinion of the practitioner. The medical note is intended as peer to peer communication and may appear blunt or direct. It is written in medical language and may contain abbreviations or verbiage that are unfamiliar.     Dietitian Malnutrition Assessment    Evaluation for Malnutrition: Criteria for severe malnutrition diagnosis- chronic illness related to   Energy intake less than  75% for greater than 1 month., Muscle mass severe depletion.               RD Malnutrition Care Plan: Recommend EN (enteral nutrition) support if unable to take adequate PO safely within 1-2 days.    Body Fat/Muscle Mass:          Physician Assessment     Patient has a diagnosis of severe malnutrition        **Certification      PHYSICIAN Certification of Need for Inpatient Hospitalization - Initial Certification    Patient will require inpatient services that will reasonably be expected to span two midnight's based on the clinical documentation in H+P.   Based on patients current state of illness, I anticipate that, after discharge, patient will require TBD.

## 2024-11-23 NOTE — DIETARY NOTE
[facility]   part of [facility]  NUTRITION ASSESSMENT    Pt meets severe malnutrition criteria at this time.    CRITERIA FOR MALNUTRITION DIAGNOSIS:  Criteria for severe malnutrition diagnosis: chronic illness related to energy intake less than 75% for greater than 1 month and muscle mass severe depletion    NUTRITION INTERVENTION:    RD nutrition Care Plan- Recommend EN (enteral nutrition) support if unable to take adequate PO safely within 1-2 days  Meal and Snacks - NPO ADAT per SLP.  Enteral Nutrition - Via PEG, recommend initiating Jevity 1.5 at 25 ml/hr and advancing 10 ml/hr q4hrs to GOAL: Jevity 1.5 at 45 ml/hr.   This will provide 1620 kcal, 69 grams protein, 821 ml total free water, and 100% of RDI's.   Recommend 130 ml water flush q 4 hours, TF+FWF provides 1601 ml total fluids.   Coordination of Nutrition Care - Recommend SLP consult prior to diet advancement. and Palliative care consult for goal of care    PATIENT STATUS:   11/23 - PEG tube placed by IR, plan to restart Tfs this evening. New order placed to start at 25ml/hr and advance to goal of 45ml/hrs    11/22-  in bed, TF off this am. No family at bedside. Plan for PEG is afternoon. +BM today. No new wt. RD to follow.   11/19-TF back on. No bowel obstruction on CT per GI. Plan for EGD w/ PEG possibly 11/20. Pt tolerating TF at 40mL/hr. Labs WNL. No new wt. +BM. On RA. Skin intact  11/16-89 year old female admitted on 11/15 presents with FTT and request for G-tube placement. Per report, family has been bringing in shakes for pt at NH but pt declining and not eating or drinking much lately. Pt screened d/t MST score 3 and consult for tube feeds. Noted pt with end stage dementia. Attempted to visit at bedside but no family present. Pt is nonverbal and not responsive to even sternal rub at this time. SLP attempted to eval but cannot given above - remains NPO. Per hospitalist note, PEG is not medically advised but family wishes to proceed  anyway. Plan to place DHT until PEG can be placed. Pt with abd distention but no other GI symptoms noted. NKFA. Will start TF slowly as she is at risk of refeeding syndrome and continue to monitor lytes and tolerance.    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   BMI: Body mass index is 24.6 kg/m².  IBW: 52.3 kg    WEIGHT HISTORY:   Patient Weight(s) for the past 336 hrs:   Weight   11/16/24 0118 63 kg (138 lb 14.2 oz)   11/15/24 1625 63 kg (138 lb 14.2 oz)       Wt Readings from Last 10 Encounters:   11/16/24 63 kg (138 lb 14.2 oz)   04/15/23 63.5 kg (139 lb 15.9 oz)   10/03/22 63.5 kg (140 lb)   09/16/22 65.7 kg (144 lb 13.5 oz)   04/10/22 70.3 kg (154 lb 15.7 oz)   01/14/22 70.3 kg (154 lb 15.7 oz)   12/01/21 70.3 kg (155 lb)   11/19/21 68 kg (150 lb)   04/09/21 70.3 kg (155 lb)   04/07/21 70.3 kg (155 lb)        NUTRITION:  Diet:       Procedures    NPO      Food Allergies: No  Cultural/Ethnic/Jewish Preferences Addressed: Yes    Percent Meals Eaten (last 3 days)       None            GI SYSTEM REVIEW: distention  Skin/Wounds: WNL    NUTRITION RELATED PHYSICAL FINDINGS:     1. Body Fat/Muscle Mass: severe muscle depletion Temple region and Clavicle region per visual exam     2. Fluid Accumulation: none per RN documentation    NUTRITION PRESCRIPTION:  63 kg Actual Body Weight  Calories: 9036-0617 calories/day (23-28 kcal/kg)  Protein: 63-95 grams protein/day (1.0-1.5 gm/kg)  Fluid: ~1 ml/kcal or per MD discretion    NUTRITION DIAGNOSIS/PROBLEM:  Malnutrition related to insufficient appetite resulting in inadequate nutrition intake as evidenced by documented/reported insufficient oral intake and loss of muscle mass    MONITOR AND EVALUATE/NUTRITION GOALS:  Weight stable within 1 to 2 lbs during admission - Ongoing  Start alternative nutrition in 24-48 hrs  if diet is not able to advance- Resolved  Tolerate alternative nutrition at 100% of goal - Met, continues      MEDICATIONS:  Pepcid    LABS:  Na 134    Pt is at High nutrition risk    China Sharp RD, LDN  Clinical Nutrition  [id number]

## 2024-11-24 VITALS
HEIGHT: 63 IN | BODY MASS INDEX: 24.61 KG/M2 | SYSTOLIC BLOOD PRESSURE: 124 MMHG | DIASTOLIC BLOOD PRESSURE: 62 MMHG | HEART RATE: 88 BPM | RESPIRATION RATE: 22 BRPM | TEMPERATURE: 99 F | WEIGHT: 138.88 LBS | OXYGEN SATURATION: 96 %

## 2024-11-24 LAB
ANION GAP SERPL CALC-SCNC: 5 MMOL/L (ref 0–18)
BASOPHILS # BLD AUTO: 0.02 X10(3) UL (ref 0–0.2)
BASOPHILS NFR BLD AUTO: 0.3 %
BUN BLD-MCNC: 7 MG/DL (ref 9–23)
CALCIUM BLD-MCNC: 8.8 MG/DL (ref 8.7–10.4)
CHLORIDE SERPL-SCNC: 102 MMOL/L (ref 98–112)
CO2 SERPL-SCNC: 27 MMOL/L (ref 21–32)
CREAT BLD-MCNC: 0.62 MG/DL
EGFRCR SERPLBLD CKD-EPI 2021: 86 ML/MIN/1.73M2 (ref 60–?)
EOSINOPHIL # BLD AUTO: 0.16 X10(3) UL (ref 0–0.7)
EOSINOPHIL NFR BLD AUTO: 2.5 %
ERYTHROCYTE [DISTWIDTH] IN BLOOD BY AUTOMATED COUNT: 14.1 %
GLUCOSE BLD-MCNC: 120 MG/DL (ref 70–99)
GLUCOSE BLD-MCNC: 128 MG/DL (ref 70–99)
GLUCOSE BLD-MCNC: 133 MG/DL (ref 70–99)
GLUCOSE BLD-MCNC: 149 MG/DL (ref 70–99)
HCT VFR BLD AUTO: 38.7 %
HGB BLD-MCNC: 13 G/DL
IMM GRANULOCYTES # BLD AUTO: 0.02 X10(3) UL (ref 0–1)
IMM GRANULOCYTES NFR BLD: 0.3 %
LYMPHOCYTES # BLD AUTO: 1.73 X10(3) UL (ref 1–4)
LYMPHOCYTES NFR BLD AUTO: 26.5 %
MAGNESIUM SERPL-MCNC: 1.7 MG/DL (ref 1.6–2.6)
MCH RBC QN AUTO: 29.8 PG (ref 26–34)
MCHC RBC AUTO-ENTMCNC: 33.6 G/DL (ref 31–37)
MCV RBC AUTO: 88.8 FL
MONOCYTES # BLD AUTO: 0.63 X10(3) UL (ref 0.1–1)
MONOCYTES NFR BLD AUTO: 9.7 %
NEUTROPHILS # BLD AUTO: 3.96 X10 (3) UL (ref 1.5–7.7)
NEUTROPHILS # BLD AUTO: 3.96 X10(3) UL (ref 1.5–7.7)
NEUTROPHILS NFR BLD AUTO: 60.7 %
OSMOLALITY SERPL CALC.SUM OF ELEC: 277 MOSM/KG (ref 275–295)
PHOSPHATE SERPL-MCNC: 3.6 MG/DL (ref 2.4–5.1)
PLATELET # BLD AUTO: 223 10(3)UL (ref 150–450)
POTASSIUM SERPL-SCNC: 3.8 MMOL/L (ref 3.5–5.1)
RBC # BLD AUTO: 4.36 X10(6)UL
SODIUM SERPL-SCNC: 134 MMOL/L (ref 136–145)
WBC # BLD AUTO: 6.5 X10(3) UL (ref 4–11)

## 2024-11-24 PROCEDURE — 99239 HOSP IP/OBS DSCHRG MGMT >30: CPT | Performed by: HOSPITALIST

## 2024-11-24 RX ORDER — METOPROLOL TARTRATE 25 MG/1
25 TABLET, FILM COATED ORAL 2 TIMES DAILY
Qty: 60 TABLET | Refills: 0 | Status: SHIPPED | OUTPATIENT
Start: 2024-11-24 | End: 2024-12-24

## 2024-11-24 RX ORDER — MAGNESIUM SULFATE HEPTAHYDRATE 40 MG/ML
2 INJECTION, SOLUTION INTRAVENOUS ONCE
Status: COMPLETED | OUTPATIENT
Start: 2024-11-24 | End: 2024-11-24

## 2024-11-24 RX ORDER — SODIUM BICARBONATE 325 MG/1
325 TABLET ORAL AS NEEDED
Qty: 30 TABLET | Refills: 0 | Status: SHIPPED | OUTPATIENT
Start: 2024-11-24 | End: 2024-12-24

## 2024-11-24 NOTE — PROGRESS NOTES
Pt is non verbal. Opens eye to voice and pain.  Mepilex changed and in place on sacrum- CDI  Dubhoff taken out per order.   PEG tube in place and intact, split gauze changed.   Tube feedings started and increasing feeding per order.  Safety measures in place.

## 2024-11-24 NOTE — DISCHARGE SUMMARY
[facility]     Sultana Hess Patient Status:  Inpatient    1935 MRN [id number]   Location [facility] 3[facility]-A Attending No att. providers found   Hosp Day # 9 PCP Aubrey Tsai     Date of Admission: 11/15/2024  Date of Discharge:  2024     Discharge Disposition: SNF Long Term Care ([facility])    Discharge Diagnosis:  #Failure to thrive/Severe Malnutrition   #Advanced dementia and Parkinson's Disease   -nonverbal/nondecisional/nonambulatory and contracted at baseline  -plan for PEG per family request -  son and daughter are physicians   -PEG successfully placed by IR  -TF at goal     #Abdominal distension and diarrhea - ileus  -likely due to tube feeds but possible Ileus on imaging, TF held - would NOT use reglan given Parkinson's   -Cdiff, GI PCR negative   -d/w GI  -resolved     #Acute encephalopathy 2/ not being able to take her Sinemet likely  -Dobbhoff in place for meds until PEG  -more awake today. Seems waxing and waning. 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     Family unavailable at the time of my evaluation. Patient is awake and is able to tell me her name. She did not answer any further questions.      ED blood work is within normal limits.    Brief Synopsis: pt w/ FTT and end stage dementia. Had some initial ileus but resolved. Family electing for PEG. GI unable to place but IR successfully placed. TF started and tolerating well. Ok to DC to LTAC.    Lace+ Score: 68  59-90 High Risk  29-58 Medium Risk  0-28   Low Risk       TCM Follow-Up Recommendation:  LACE > 58: High Risk of readmission after discharge from the  hospital.      Procedures during hospitalization:   PEG    Incidental or significant findings and recommendations (brief descriptions):  none    Lab/Test results pending at Discharge:   none    Consultants:  IR, GI    Discharge Medication List:     Discharge Medications        START taking these medications        Instructions Prescription details   amLODIPine 1mg/ml Susp  Commonly known as: Norvasc  Start taking on: November 25, 2024  Replaces: amLODIPine 5 MG Tabs      5 mL (5 mg total) by Per NG Tube route daily.   Stop taking on: December 25, 2024  Quantity: 150 mL  Refills: 0     metoprolol tartrate 25 MG Tabs  Commonly known as: Lopressor      Take 1 tablet (25 mg total) by mouth 2 (two) times daily. Via PEG tube   Stop taking on: December 24, 2024  Quantity: 60 tablet  Refills: 0     pancrelipase (Lip-Prot-Amyl) [id number] units Cpep  Commonly known as: Zenpep      1 capsule (10,000 Units total) by Per G Tube route as needed (to unclog non-patent tubes).   Quantity: 270 capsule  Refills: 0     sodium bicarbonate 325 MG Tabs      1 tablet (325 mg total) by Per G Tube route as needed (to unclog non-patent tubes).   Stop taking on: December 24, 2024  Quantity: 30 tablet  Refills: 0            CONTINUE taking these medications        Instructions Prescription details   acetaminophen 325 MG Tabs  Commonly known as: Tylenol      Take 2 tablets (650 mg total) by mouth every 6 (six) hours as needed for Pain.   Refills: 0     carbidopa-levodopa  MG Tabs  Commonly known as: SINEMET      Take 2 tablets by mouth 3 (three) times daily.   Refills: 0     famotidine 20 MG Tabs  Commonly known as: Pepcid      1 tablet (20 mg total)  in the morning and 1 tablet (20 mg total) before bedtime.   Refills: 5     hydrocortisone 1 % Crea      Apply 1 Application topically daily as needed (dermatitis).   Refills: 0     lactulose 20 GM/30ML Soln  Commonly known as: ENULOSE      Take 30 mL (20 g total) by mouth every morning.    Refills: 0     Loratadine 10 MG Caps      Take 10 mg by mouth daily.   Refills: 0     polyethylene glycol (PEG 3350) 17 g Pack  Commonly known as: Miralax      Take 17 g by mouth daily.   Refills: 0     sennosides 8.6 MG Tabs  Commonly known as: Senokot      Take 2 tablets (17.2 mg total) by mouth 2 (two) times daily as needed for constipation.   Refills: 0            STOP taking these medications      amLODIPine 5 MG Tabs  Commonly known as: Norvasc  Replaced by: amLODIPine 1mg/ml Susp        metoprolol succinate ER 50 MG Tb24  Commonly known as: Toprol XL                  Where to Get Your Medications        Please  your prescriptions at the location directed by your doctor or nurse    Bring a paper prescription for each of these medications  amLODIPine 1mg/ml Susp  metoprolol tartrate 25 MG Tabs  pancrelipase (Lip-Prot-Amyl) [id number] units Cpep  sodium bicarbonate 325 MG Tabs         [place]    Follow-up appointment:   Aubrey Tsai  [address] [place] [address] [id number] [phone]    Schedule an appointment as soon as possible for a visit      Appointments for Next 30 Days 2024 - 2024      None            Vital signs:  Temp:  [98.5 °F (36.9 °C)-99.1 °F (37.3 °C)] 99.1 °F (37.3 °C)  Pulse:  [87-88] 88  Resp:  [20-22] 22  BP: (103-126)/(51-62) 124/62  SpO2:  [94 %-97 %] 96 %    Physical Exam:    General: No acute distress   Lungs: clear to auscultation  Cardiovascular: S1, S2  Abdomen: Soft  Neuro: unable to state her name  Extremities: Multiple contractures in b/l upper and lower extremities      -----------------------------------------------------------------------------------------------  PATIENT DISCHARGE INSTRUCTIONS: See electronic chart    Josué sOwald MD    Total time spent on discharge plannin minutes     The  [employer] Cures Act makes medical notes like these available to patients in the interest of transparency. Please be advised this is a  medical document. Medical documents are intended to carry relevant information, facts as evident, and the clinical opinion of the practitioner. The medical note is intended as peer to peer communication and may appear blunt or direct. It is written in medical language and may contain abbreviations or verbiage that are unfamiliar.

## 2024-11-24 NOTE — CM/SW NOTE
11/24/24 1200   Discharge disposition   Expected discharge disposition [facility]   Discharge transportation [facility]     SW noted that patient is medically cleared for return to [facility]. SW confirmed with facility that they are able to accept patient back today.     [facility] [phone] has been requested to arrange ambulance for  time of 1:30pm. PCS form completed.    [facility] [phone]    SW will continue to follow for plan of care changes and remain available for any additional DC needs or concerns.     Jerica Sandoval MSW, LSW  Discharge Planner   [id number]

## 2024-11-24 NOTE — PLAN OF CARE
Patient is alert and oriented x4.  PEG tube intact with split gauze. VSS, voiding adequately into brief, pain controlled, Discharge education provided to [facility]. All questions answered and concerns addressed, Pt DC in stable condition. PIV removed and intact. Pt assessed and ready for discharge.     This RN called report to RN at [facility] at 1233. Ambulance will be here around 1330.

## 2024-11-24 NOTE — PLAN OF CARE
Pt here from: [facility].   Neuro: LISBET orientation level b/c pt is non-verbal, VSS, RA, , IS. No cough, chest pain, pt is tachypneic.    GI: Abdomen firm and rounded, passing gas and belching. Denies nausea. Last BM was 11/23.  : Incontinent, voids in brief.   Bed bound, total assist.   Drains: PEG tube intact, dressing intact split gauze).   Diet: NPO.   IVF running per order through PIV L wrist/forearm.    All appropriate safety measures in place. All questions and concerns addressed. Bed locked and in lowest position. Call light in reach.

## 2024-12-12 ENCOUNTER — HOSPITAL ENCOUNTER (INPATIENT)
Facility: HOSPITAL | Age: 89
LOS: 2 days | Discharge: NURSING, REHAB OR LONG-TERM CARE | End: 2024-12-14
Attending: EMERGENCY MEDICINE | Admitting: HOSPITALIST
Payer: MEDICARE

## 2024-12-12 ENCOUNTER — APPOINTMENT (OUTPATIENT)
Dept: GENERAL RADIOLOGY | Facility: HOSPITAL | Age: 89
End: 2024-12-12
Attending: EMERGENCY MEDICINE
Payer: MEDICARE

## 2024-12-12 DIAGNOSIS — A41.9 SEPSIS DUE TO UNDETERMINED ORGANISM (HCC): ICD-10-CM

## 2024-12-12 DIAGNOSIS — E86.0 DEHYDRATION: ICD-10-CM

## 2024-12-12 DIAGNOSIS — E87.0 HYPERNATREMIA: Primary | ICD-10-CM

## 2024-12-12 LAB
ADENOVIRUS PCR:: NOT DETECTED
ALBUMIN SERPL-MCNC: 3.6 G/DL (ref 3.2–4.8)
ALBUMIN/GLOB SERPL: 0.8 {RATIO} (ref 1–2)
ALP LIVER SERPL-CCNC: 69 U/L
ALT SERPL-CCNC: 29 U/L
ANION GAP SERPL CALC-SCNC: 6 MMOL/L (ref 0–18)
ANION GAP SERPL CALC-SCNC: 6 MMOL/L (ref 0–18)
ANION GAP SERPL CALC-SCNC: 9 MMOL/L (ref 0–18)
AST SERPL-CCNC: 30 U/L (ref ?–34)
ATRIAL RATE: 106 BPM
B PARAPERT DNA SPEC QL NAA+PROBE: NOT DETECTED
B PERT DNA SPEC QL NAA+PROBE: NOT DETECTED
BASOPHILS # BLD AUTO: 0.04 X10(3) UL (ref 0–0.2)
BASOPHILS NFR BLD AUTO: 0.3 %
BILIRUB SERPL-MCNC: 0.4 MG/DL (ref 0.2–1.1)
BILIRUB UR QL STRIP.AUTO: NEGATIVE
BUN BLD-MCNC: 30 MG/DL (ref 9–23)
BUN BLD-MCNC: 37 MG/DL (ref 9–23)
BUN BLD-MCNC: 49 MG/DL (ref 9–23)
C PNEUM DNA SPEC QL NAA+PROBE: NOT DETECTED
CALCIUM BLD-MCNC: 5.6 MG/DL (ref 8.7–10.4)
CALCIUM BLD-MCNC: 7.9 MG/DL (ref 8.7–10.4)
CALCIUM BLD-MCNC: 9 MG/DL (ref 8.7–10.4)
CHLORIDE SERPL-SCNC: 118 MMOL/L (ref 98–112)
CHLORIDE SERPL-SCNC: 118 MMOL/L (ref 98–112)
CHLORIDE SERPL-SCNC: 86 MMOL/L (ref 98–112)
CLARITY UR REFRACT.AUTO: CLEAR
CO2 SERPL-SCNC: 21 MMOL/L (ref 21–32)
CO2 SERPL-SCNC: 28 MMOL/L (ref 21–32)
CO2 SERPL-SCNC: 31 MMOL/L (ref 21–32)
COLOR UR AUTO: YELLOW
CORONAVIRUS 229E PCR:: NOT DETECTED
CORONAVIRUS HKU1 PCR:: NOT DETECTED
CORONAVIRUS NL63 PCR:: NOT DETECTED
CORONAVIRUS OC43 PCR:: NOT DETECTED
CREAT BLD-MCNC: 0.71 MG/DL
CREAT BLD-MCNC: 0.9 MG/DL
CREAT BLD-MCNC: 1.06 MG/DL
EGFRCR SERPLBLD CKD-EPI 2021: 51 ML/MIN/1.73M2 (ref 60–?)
EGFRCR SERPLBLD CKD-EPI 2021: 62 ML/MIN/1.73M2 (ref 60–?)
EGFRCR SERPLBLD CKD-EPI 2021: 82 ML/MIN/1.73M2 (ref 60–?)
EOSINOPHIL # BLD AUTO: 0.15 X10(3) UL (ref 0–0.7)
EOSINOPHIL NFR BLD AUTO: 1.2 %
ERYTHROCYTE [DISTWIDTH] IN BLOOD BY AUTOMATED COUNT: 15.9 %
FLUAV + FLUBV RNA SPEC NAA+PROBE: NEGATIVE
FLUAV + FLUBV RNA SPEC NAA+PROBE: NEGATIVE
FLUAV RNA SPEC QL NAA+PROBE: NOT DETECTED
FLUBV RNA SPEC QL NAA+PROBE: NOT DETECTED
GLOBULIN PLAS-MCNC: 4.3 G/DL (ref 2–3.5)
GLUCOSE BLD-MCNC: 1186 MG/DL (ref 70–99)
GLUCOSE BLD-MCNC: 144 MG/DL (ref 70–99)
GLUCOSE BLD-MCNC: 164 MG/DL (ref 70–99)
GLUCOSE UR STRIP.AUTO-MCNC: NORMAL MG/DL
HCT VFR BLD AUTO: 45.8 %
HGB BLD-MCNC: 14.3 G/DL
IMM GRANULOCYTES # BLD AUTO: 0.05 X10(3) UL (ref 0–1)
IMM GRANULOCYTES NFR BLD: 0.4 %
LACTATE SERPL-SCNC: 2 MMOL/L (ref 0.5–2)
LEUKOCYTE ESTERASE UR QL STRIP.AUTO: NEGATIVE
LYMPHOCYTES # BLD AUTO: 2.02 X10(3) UL (ref 1–4)
LYMPHOCYTES NFR BLD AUTO: 16.4 %
MCH RBC QN AUTO: 30.4 PG (ref 26–34)
MCHC RBC AUTO-ENTMCNC: 31.2 G/DL (ref 31–37)
MCV RBC AUTO: 97.2 FL
METAPNEUMOVIRUS PCR:: NOT DETECTED
MONOCYTES # BLD AUTO: 0.73 X10(3) UL (ref 0.1–1)
MONOCYTES NFR BLD AUTO: 5.9 %
MYCOPLASMA PNEUMONIA PCR:: NOT DETECTED
NEUTROPHILS # BLD AUTO: 9.35 X10 (3) UL (ref 1.5–7.7)
NEUTROPHILS # BLD AUTO: 9.35 X10(3) UL (ref 1.5–7.7)
NEUTROPHILS NFR BLD AUTO: 75.8 %
NITRITE UR QL STRIP.AUTO: NEGATIVE
OSMOLALITY SERPL CALC.SUM OF ELEC: 309 MOSM/KG (ref 275–295)
OSMOLALITY SERPL CALC.SUM OF ELEC: 325 MOSM/KG (ref 275–295)
OSMOLALITY SERPL CALC.SUM OF ELEC: 337 MOSM/KG (ref 275–295)
P AXIS: 18 DEGREES
P-R INTERVAL: 120 MS
PARAINFLUENZA 1 PCR:: NOT DETECTED
PARAINFLUENZA 2 PCR:: NOT DETECTED
PARAINFLUENZA 3 PCR:: NOT DETECTED
PARAINFLUENZA 4 PCR:: NOT DETECTED
PH UR STRIP.AUTO: 6 [PH] (ref 5–8)
PLATELET # BLD AUTO: 228 10(3)UL (ref 150–450)
POTASSIUM SERPL-SCNC: 4 MMOL/L (ref 3.5–5.1)
POTASSIUM SERPL-SCNC: 4.6 MMOL/L (ref 3.5–5.1)
POTASSIUM SERPL-SCNC: 4.7 MMOL/L (ref 3.5–5.1)
PROT SERPL-MCNC: 7.9 G/DL (ref 5.7–8.2)
PROT UR STRIP.AUTO-MCNC: 20 MG/DL
Q-T INTERVAL: 330 MS
QRS DURATION: 64 MS
QTC CALCULATION (BEZET): 438 MS
R AXIS: -10 DEGREES
RBC # BLD AUTO: 4.71 X10(6)UL
RBC UR QL AUTO: NEGATIVE
RHINOVIRUS/ENTERO PCR:: NOT DETECTED
RSV RNA SPEC NAA+PROBE: NEGATIVE
RSV RNA SPEC QL NAA+PROBE: NOT DETECTED
SARS-COV-2 RNA NPH QL NAA+NON-PROBE: NOT DETECTED
SARS-COV-2 RNA RESP QL NAA+PROBE: NOT DETECTED
SODIUM SERPL-SCNC: 116 MMOL/L (ref 136–145)
SODIUM SERPL-SCNC: 152 MMOL/L (ref 136–145)
SODIUM SERPL-SCNC: 155 MMOL/L (ref 136–145)
SP GR UR STRIP.AUTO: 1.03 (ref 1–1.03)
T AXIS: 38 DEGREES
UROBILINOGEN UR STRIP.AUTO-MCNC: 3 MG/DL
VENTRICULAR RATE: 106 BPM
WBC # BLD AUTO: 12.3 X10(3) UL (ref 4–11)

## 2024-12-12 PROCEDURE — 99223 1ST HOSP IP/OBS HIGH 75: CPT | Performed by: HOSPITALIST

## 2024-12-12 PROCEDURE — 71045 X-RAY EXAM CHEST 1 VIEW: CPT | Performed by: EMERGENCY MEDICINE

## 2024-12-12 RX ORDER — LACTULOSE 10 G/15ML
30 SOLUTION ORAL DAILY
Status: DISCONTINUED | OUTPATIENT
Start: 2024-12-13 | End: 2024-12-14

## 2024-12-12 RX ORDER — METOCLOPRAMIDE HYDROCHLORIDE 5 MG/ML
5 INJECTION INTRAMUSCULAR; INTRAVENOUS EVERY 8 HOURS PRN
Status: DISCONTINUED | OUTPATIENT
Start: 2024-12-12 | End: 2024-12-14

## 2024-12-12 RX ORDER — ONDANSETRON 2 MG/ML
4 INJECTION INTRAMUSCULAR; INTRAVENOUS EVERY 6 HOURS PRN
Status: DISCONTINUED | OUTPATIENT
Start: 2024-12-12 | End: 2024-12-14

## 2024-12-12 RX ORDER — AMOXICILLIN 875 MG/1
875 TABLET, COATED ORAL 2 TIMES DAILY
COMMUNITY
End: 2024-12-12 | Stop reason: CLARIF

## 2024-12-12 RX ORDER — AMLODIPINE BESYLATE 5 MG/1
5 TABLET ORAL DAILY
COMMUNITY

## 2024-12-12 RX ORDER — METOPROLOL TARTRATE 25 MG/1
50 TABLET, FILM COATED ORAL 2 TIMES DAILY
COMMUNITY

## 2024-12-12 RX ORDER — ACETAMINOPHEN 650 MG/1
650 SUPPOSITORY RECTAL EVERY 6 HOURS PRN
Status: DISCONTINUED | OUTPATIENT
Start: 2024-12-12 | End: 2024-12-14

## 2024-12-12 RX ORDER — IBUPROFEN 100 MG/5ML
400 SUSPENSION ORAL EVERY 4 HOURS PRN
Status: DISCONTINUED | OUTPATIENT
Start: 2024-12-12 | End: 2024-12-14

## 2024-12-12 RX ORDER — IBUPROFEN 100 MG/5ML
600 SUSPENSION ORAL EVERY 4 HOURS PRN
Status: DISCONTINUED | OUTPATIENT
Start: 2024-12-12 | End: 2024-12-14

## 2024-12-12 RX ORDER — IBUPROFEN 100 MG/5ML
200 SUSPENSION ORAL EVERY 4 HOURS PRN
Status: DISCONTINUED | OUTPATIENT
Start: 2024-12-12 | End: 2024-12-14

## 2024-12-12 RX ORDER — DEXTROSE MONOHYDRATE 50 MG/ML
INJECTION, SOLUTION INTRAVENOUS CONTINUOUS
Status: DISCONTINUED | OUTPATIENT
Start: 2024-12-12 | End: 2024-12-13

## 2024-12-12 RX ORDER — ACETAMINOPHEN 500 MG
1000 TABLET ORAL EVERY 6 HOURS PRN
Status: DISCONTINUED | OUTPATIENT
Start: 2024-12-12 | End: 2024-12-14

## 2024-12-12 RX ORDER — ACETAMINOPHEN 325 MG/1
650 TABLET ORAL 2 TIMES DAILY
COMMUNITY

## 2024-12-12 NOTE — ED PROVIDER NOTES
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  Placed on Augmentin.  She is nonverbal contracted at baseline.  Nursing home reported respiratory rate of 40 brought to ED for evaluation.  Found to be febrile patient is DNR DNI   History obtained mainly from grandson.    Objective:     Past Medical History:    Anemia    Cognitive communication deficit    COVID-19 virus infection    Dementia (HCC)    Depression    Dysphagia    Esophageal reflux    Essential hypertension    High blood pressure    Hyperlipidemia    Obesity    Osteoarthritis    Parkinson disease (HCC)    Prediabetes    Prediabetes    Unspecified fracture of shaft of humerus, right arm, sequela              Past Surgical History:   Procedure Laterality Date    Gastrostomy tube placement      Knee replacement surgery Bilateral                       Social History     Socioeconomic History    Marital status:    Tobacco Use    Smoking status: Never    Smokeless tobacco: Never   Vaping Use    Vaping status: Never Used   Substance and Sexual Activity    Alcohol use: No    Drug use: No   Other Topics Concern    Caffeine Concern Yes     Comment: 1 cup of tea daily    Exercise No     Social Drivers of Health     Food Insecurity: Unknown (2024)    Food Insecurity     Food Insecurity: Patient unable to answer   Transportation Needs: Unknown (2024)    Transportation Needs     Lack of Transportation: Patient unable to answer   Housing Stability: Unknown (2024)    Housing Stability      Housing Instability: Patient unable to answer                  Physical Exam     ED Triage Vitals [12/12/24 1255]   /61   Pulse 105   Resp (!) 32   Temp (!) 100.6 °F (38.1 °C)   Temp src Temporal   SpO2 98 %   O2 Device Nasal cannula       Current Vitals:   Vital Signs  BP: 98/62  Pulse: 93  Resp: 21  Temp: 99.4 °F (37.4 °C)  Temp src: Rectal  MAP (mmHg): 75    Oxygen Therapy  SpO2: 99 %  O2 Device: Nasal cannula  O2 Flow Rate (L/min): 2 L/min        Physical Exam  Vitals and nursing note reviewed.   Constitutional:       Appearance: She is well-developed. She is ill-appearing.   HENT:      Head: Normocephalic and atraumatic.      Mouth/Throat:      Comments: Dry mucous membranes  Eyes:      General: No scleral icterus.  Cardiovascular:      Rate and Rhythm: Normal rate.   Pulmonary:      Effort: Pulmonary effort is normal. No respiratory distress.      Breath sounds: No wheezing or rales.   Abdominal:      General: There is no distension.      Tenderness: There is no abdominal tenderness. There is no guarding or rebound.   Musculoskeletal:         General: No tenderness. Normal range of motion.      Cervical back: Normal range of motion and neck supple.   Skin:     General: Skin is warm and dry.      Findings: No rash.   Neurological:      Mental Status: She is alert.      Motor: No abnormal muscle tone.      Comments: Nonverbal            ED Course     Labs Reviewed   COMP METABOLIC PANEL (14) - Abnormal; Notable for the following components:       Result Value    Glucose 164 (*)     Sodium 155 (*)     Chloride 118 (*)     BUN 49 (*)     Calculated Osmolality 337 (*)     Globulin  4.3 (*)     A/G Ratio 0.8 (*)     All other components within normal limits   CBC WITH DIFFERENTIAL WITH PLATELET - Abnormal; Notable for the following components:    WBC 12.3 (*)     Neutrophil Absolute Prelim 9.35 (*)     Neutrophil Absolute 9.35 (*)     All other components within normal limits   URINALYSIS WITH CULTURE  REFLEX - Abnormal; Notable for the following components:    Ketones Urine Trace (*)     Protein Urine 20 (*)     Urobilinogen Urine 3 (*)     All other components within normal limits   LACTIC ACID, PLASMA - Normal   SARS-COV-2/FLU A AND B/RSV BY PCR (GENEXPERT) - Normal    Narrative:     This test is intended for the qualitative detection and differentiation of SARS-CoV-2, influenza A, influenza B, and respiratory syncytial virus (RSV) viral RNA in nasopharyngeal or nares swabs from individuals suspected of respiratory viral infection consistent with COVID-19 by their healthcare provider. Signs and symptoms of respiratory viral infection due to SARS-CoV-2, influenza, and RSV can be similar.    Test performed using the Xpert Xpress SARS-CoV-2/FLU/RSV (real time RT-PCR)  assay on the [employer] instrument, [employer], [place] [address].   This test is being used under the Food and Drug Administration's Emergency Use Authorization.    The authorized Fact Sheet for Healthcare Providers for this assay is available upon request from the laboratory.   RAINBOW DRAW LAVENDER   RAINBOW DRAW LIGHT GREEN   RAINBOW DRAW BLUE   RAINBOW DRAW GOLD   BLOOD CULTURE   BLOOD CULTURE     EKG    Rate, intervals and axes as noted on EKG Report.  Rate: 106  Rhythm: Sinus Rhythm  Reading: Sinus tachycardia.  No ischemic changes                        MDM             -History source other than patient - grandson        -Comorbidities did add complexity to the management are mentioned in the HPI above        -I personally reviewed the prior external notes and the medical record to obtain additional history reviewed patient's chest x-ray from a couple days ago.  Done at the nursing home.  There is no infiltrates.        -DDX: Includes but not limited to sepsis, UTI, pneumonia, dehydration which is a medical condition that can pose a threat to life/function        -I personally reviewed the radiographs findings and they show no  infiltrates  Please refer to radiology report for official interpretation    Labs Reviewed   COMP METABOLIC PANEL (14) - Abnormal; Notable for the following components:       Result Value    Glucose 164 (*)     Sodium 155 (*)     Chloride 118 (*)     BUN 49 (*)     Calculated Osmolality 337 (*)     Globulin  4.3 (*)     A/G Ratio 0.8 (*)     All other components within normal limits   CBC WITH DIFFERENTIAL WITH PLATELET - Abnormal; Notable for the following components:    WBC 12.3 (*)     Neutrophil Absolute Prelim 9.35 (*)     Neutrophil Absolute 9.35 (*)     All other components within normal limits   URINALYSIS WITH CULTURE REFLEX - Abnormal; Notable for the following components:    Ketones Urine Trace (*)     Protein Urine 20 (*)     Urobilinogen Urine 3 (*)     All other components within normal limits   LACTIC ACID, PLASMA - Normal   SARS-COV-2/FLU A AND B/RSV BY PCR (GENEXPERT) - Normal    Narrative:     This test is intended for the qualitative detection and differentiation of SARS-CoV-2, influenza A, influenza B, and respiratory syncytial virus (RSV) viral RNA in nasopharyngeal or nares swabs from individuals suspected of respiratory viral infection consistent with COVID-19 by their healthcare provider. Signs and symptoms of respiratory viral infection due to SARS-CoV-2, influenza, and RSV can be similar.    Test performed using the Xpert Xpress SARS-CoV-2/FLU/RSV (real time RT-PCR)  assay on the Gene[employer]pert instrument, [employer], [place] [address].   This test is being used under the Food and Drug Administration's Emergency Use Authorization.    The authorized Fact Sheet for Healthcare Providers for this assay is available upon request from the laboratory.   RAINBOW DRAW LAVENDER   RAINBOW DRAW LIGHT GREEN   RAINBOW DRAW BLUE   RAINBOW DRAW GOLD   BLOOD CULTURE   BLOOD CULTURE     Labs show hyponatremia elevated BUN, white count 12.3.  No evidence of UTI.  COVID is RSV influenza negative.  Patient  pancultured.  Started on fluids antibiotics.  Patient will be admitted to the hospital service for further care.  [facility]      A total of 30 minutes of critical care time (exclusive of billable procedures) was administered to manage the patient's critical lab values, unstable vital signs, and metabolic instability due to her hypotension dehydration hypernatremia, possible sepsis.  This involved direct patient intervention, complex decision making, and/or extensive discussions with the patient, family, and clinical staff.      Admission disposition: 12/12/2024  2:46 PM           Medical Decision Making      Disposition and Plan     Clinical Impression:  1. Hypernatremia    2. Dehydration    3. Sepsis due to undetermined organism (HCC)         Disposition:  Admit  12/12/2024  2:46 pm    Follow-up:  No follow-up provider specified.        Medications Prescribed:  Current Discharge Medication List              Supplementary Documentation:     [facility]   [facility] of [facility]      Sepsis Reassessment Note    BP 98/62   Pulse 93   Temp 99.4 °F (37.4 °C) (Rectal)   Resp 21   SpO2 99%      I completed the sepsis reassessment at 1447    Cardiac:  Regularity: Regular  Rate: Normal  Heart Sounds: S1,S2    Lungs:   Right: Clear  Left: Clear    Peripheral Pulses:  Radial: Right 1+ or Left 1+      Capillary Refill:  <3 Secs    Skin:  Temp/Moisture: Warm and Dry  Color: Normal      Monica Ramos MD  12/12/2024  2:47 PM       Hospital Problems       Present on Admission  Date Reviewed: 11/22/2024            ICD-10-CM Noted POA    * (Principal) Hypernatremia E87.0 12/12/2024 Unknown

## 2024-12-12 NOTE — ED INITIAL ASSESSMENT (HPI)
Pt to ER via [facility] from [facility], DNR DNI, selective treatment. Pt is nonverbal at baseline and contracted Family at bedside to assist with information- Unique Ramos grandson-not POA. Pt baseline 2L 97%. Diagnosed with bronchitis and started on antibiotics. G tube in place. POA is a physician, grandson at bedside (not POA) is also physician facilitating hx

## 2024-12-12 NOTE — H&P
[facility]     Sultana Hess Patient Status:  Emergency    1935 MRN [id number]   Location [facility] EMERGENCY DEPARTMENT Attending Monica Ramos MD   Hosp Day # 0 PCP Aubrey Tsai     Chief Complaint: Fever    Subjective:    History of Present Illness:     Sultana Hess is a 89 year old female with advanced dementia (nonverbal), Parkinson's disease (contracted RUE), depression, essential hypertension, dyslipidemia, dysphagia sp PEG who was brought to the ER d/t tachypnea and fever. Patient recently diagnosed with bronchitis. She was noted to be tachypneic , hypoxic and febrile for which she was brought to the ER. Patient nonverbal.     History/Other:    Past Medical History:  Past Medical History:    Anemia    Cognitive communication deficit    COVID-19 virus infection    Dementia (HCC)    Depression    Dysphagia    Esophageal reflux    Essential hypertension    High blood pressure    Hyperlipidemia    Obesity    Osteoarthritis    Parkinson disease (HCC)    Prediabetes    Prediabetes    Unspecified fracture of shaft of humerus, right arm, sequela     Past Surgical History:   Past Surgical History:   Procedure Laterality Date    Gastrostomy tube placement      Knee replacement surgery Bilateral             Family History:   Family History   Problem Relation Age of Onset    Bleeding Disorders Neg     Genetic Disease Neg      Social History:    reports that she has never smoked. She has never used smokeless tobacco. She reports that she does not drink alcohol and does not use drugs.     Allergies: Allergies[1]    Medications:  Medications Ordered Prior to Encounter[2]    Review of Systems:   Limited d/t patient factors    Objective:   Physical Exam:    /57 (BP Location: Left arm)   Pulse 92   Temp 97.9 °F (36.6 °C) (Axillary)   Resp 22   Wt 129 lb 3.2 oz (58.6 kg)   SpO2 100%   BMI 22.89 kg/m²   General: No acute distress  HEENT: Poor oral care    Respiratory: Coarse breath sounds   Cardiovascular: S1, S2. Regular tachycardic  Abdomen: Soft, Non-tender, non-distended, positive bowel sounds  Extremities: RUE contracted    Results:    Labs:      Labs Last 24 Hours:    Recent Labs   Lab 12/12/24  1305   RBC 4.71   HGB 14.3   HCT 45.8   MCV 97.2   MCH 30.4   MCHC 31.2   RDW 15.9   NEPRELIM 9.35*   WBC 12.3*   .0       Recent Labs   Lab 12/12/24  1305   *   BUN 49*   CREATSERUM 0.90   EGFRCR 62   CA 9.0   ALB 3.6   *   K 4.6   *   CO2 31.0   ALKPHO 69   AST 30   ALT 29   BILT 0.4   TP 7.9       Lab Results   Component Value Date    INR 1.18 11/21/2024    INR 1.23 (H) 09/16/2022    INR 1.11 04/10/2022       No results for input(s): \"TROP\", \"TROPHS\", \"CK\" in the last 168 hours.    No results for input(s): \"TROP\", \"PBNP\" in the last 168 hours.    No results for input(s): \"PCT\" in the last 168 hours.    Imaging: Imaging data reviewed in Epic.    Assessment & Plan:      #Sepsis with acute hypoxic respiratory failure d/t presumed pneumonia, aspiration?  -Admit  -IVF  -IV abx  -Oxygen  -Check cultures    #Hypernatremia  -D5  -Monitor UOP, creatinine and electrolytes     #Dysphagia sp PEG  -Dietary consult for TF    #Advanced dementia (nonverbal)  #Parkinson's disease (contracted RUE)  #Depression  #Essential hypertension  #Dyslipidemia    Plan of care discussed with family and ER.    Andreas Rodriguez MD    Supplementary Documentation:     The 21st Century Cures Act makes medical notes like these available to patients in the interest of transparency. Please be advised this is a medical document. Medical documents are intended to carry relevant information, facts as evident, and the clinical opinion of the practitioner. The medical note is intended as peer to peer communication and may appear blunt or direct. It is written in medical language and may contain abbreviations or verbiage that are unfamiliar.                                     [1] No  Known Allergies  [2]   Current Facility-Administered Medications on File Prior to Encounter   Medication Dose Route Frequency Provider Last Rate Last Admin    [COMPLETED] magnesium sulfate in sterile water for injection 2 g/50mL IVPB premix 2 g  2 g Intravenous Once Parul Falk MD 50 mL/hr at 24 0826 2 g at 24 0826    [COMPLETED] magnesium sulfate in sterile water for injection 2 g/50mL IVPB premix 2 g  2 g Intravenous Once Josué Oswald MD 50 mL/hr at 24 0819 2 g at 24 0819    [COMPLETED] fentaNYL (Sublimaze) 50 mcg/mL injection             [COMPLETED] midazolam (Versed) 2 MG/2ML injection             [COMPLETED] lidocaine (Xylocaine) 1 % injection             [COMPLETED] iopamidol (ISOVUE-300) 61 % injection 30 mL  30 mL Injection ONCE PRN Shiv Hinojosa MD   10 mL at 24 1619    [COMPLETED] magnesium oxide (Mag-Ox) tab 400 mg  400 mg Oral Once Josué Oswald MD   400 mg at 24 1812    [COMPLETED] Barium Sulfate (VARIBAR THIN) 40 % oral liquid SUSR 148 g  237 mL Oral Once Shiv Hinojosa MD   148 g at 24 0553    [COMPLETED] magnesium oxide (Mag-Ox) tab 400 mg  400 mg Oral Once Simon Reagan MD   400 mg at 24 0935    [COMPLETED] magnesium sulfate in sterile water for injection 2 g/50mL IVPB premix 2 g  2 g Intravenous Once Parul Falk MD 50 mL/hr at 24 0948 2 g at 24 0948    [COMPLETED] iopamidol 76% (ISOVUE-370) injection for power injector  80 mL Intravenous ONCE PRN Parul Falk MD   80 mL at 24 1711    [COMPLETED] magnesium sulfate in sterile water for injection 2 g/50mL IVPB premix 2 g  2 g Intravenous Once Ghazal Pride DO 50 mL/hr at 24 1713 2 g at 24 1713    [] dextrose 5%-sodium chloride 0.45% infusion   Intravenous Continuous Sue Barrera DO         Current Outpatient Medications on File Prior to Encounter   Medication Sig Dispense Refill    acetaminophen 325 MG Oral Tab 2 tablets (650 mg total) by Per G  Tube route 2 (two) times daily.      amLODIPine 5 MG Oral Tab 1 tablet (5 mg total) by Per G Tube route daily.      amoxicillin clavulanate 875-125 MG Oral Tab Take 1 tablet by mouth 2 (two) times daily.      metoprolol tartrate 25 MG Oral Tab 2 tablets (50 mg total) by Per G Tube route 2 (two) times daily.      pancrelipase, Lip-Prot-Amyl, 15327-67726 units Oral Cap DR Particles 1 capsule (10,000 Units total) by Per G Tube route as needed (to unclog non-patent tubes). (Patient taking differently: 1 capsule (10,000 Units total) by Per G Tube route daily as needed (to unclog non-patent tubes).) 270 capsule 0    lactulose 20 GM/30ML Oral Solution 30 mL (20 g total) by Per G Tube route daily.      hydrocortisone 1 % External Cream Apply 1 Application topically daily as needed (dermatitis).      loratadine 10 MG Oral Tab 1 tablet (10 mg total) by Per G Tube route daily.      polyethylene glycol, PEG 3350, 17 g Oral Powd Pack 17 g by Per G Tube route daily.      acetaminophen 325 MG Oral Tab 2 tablets (650 mg total) by Per G Tube route every 6 (six) hours as needed for Pain.      sennosides 8.6 MG Oral Tab 2 tablets (17.2 mg total) by Per G Tube route daily as needed for constipation.      famoTIDine (PEPCID) 20 MG Oral Tab 1 tablet (20 mg total) by Per G Tube route 2 (two) times daily.  5    sodium bicarbonate 325 MG Oral Tab 1 tablet (325 mg total) by Per G Tube route as needed (to unclog non-patent tubes). (Patient taking differently: 1 tablet (325 mg total) by Per G Tube route daily as needed (to unclog non-patent tubes).) 30 tablet 0    carbidopa-levodopa  MG Oral Tab Take 2 tablets by mouth 3 (three) times daily. (Patient not taking: Reported on 12/12/2024)

## 2024-12-12 NOTE — PROGRESS NOTES
NURSING ADMISSION NOTE      Patient admitted via Cart  Oriented to room.  Safety precautions initiated.  Bed in low position.  Call light in reach.    Pt received unresponsive, non-verbal. Afebrile. VSS, 2L NC. No objective signs of pain. Admission navigator and med rec complete. Dr. Rodriguez notified. No family at bedside. D5W infusing @ 83ml/hr. IV zosyn per MAR. RT to collect RVP. Fall precautions in place. Call light in reach.     1825 - Pt's son updated at bedside. Extensive oral care completed on pt. Night shift to notify MD of repeat BMP results @ 2000.

## 2024-12-13 LAB
ALBUMIN SERPL-MCNC: 3 G/DL (ref 3.2–4.8)
ALBUMIN/GLOB SERPL: 0.8 {RATIO} (ref 1–2)
ALP LIVER SERPL-CCNC: 53 U/L
ALT SERPL-CCNC: 20 U/L
ANION GAP SERPL CALC-SCNC: 10 MMOL/L (ref 0–18)
AST SERPL-CCNC: 26 U/L (ref ?–34)
BASOPHILS # BLD AUTO: 0.06 X10(3) UL (ref 0–0.2)
BASOPHILS NFR BLD AUTO: 0.5 %
BILIRUB SERPL-MCNC: 0.7 MG/DL (ref 0.2–1.1)
BUN BLD-MCNC: 36 MG/DL (ref 9–23)
CALCIUM BLD-MCNC: 8.3 MG/DL (ref 8.7–10.4)
CHLORIDE SERPL-SCNC: 114 MMOL/L (ref 98–112)
CO2 SERPL-SCNC: 25 MMOL/L (ref 21–32)
CREAT BLD-MCNC: 0.64 MG/DL
EGFRCR SERPLBLD CKD-EPI 2021: 85 ML/MIN/1.73M2 (ref 60–?)
EOSINOPHIL # BLD AUTO: 0.49 X10(3) UL (ref 0–0.7)
EOSINOPHIL NFR BLD AUTO: 4.4 %
ERYTHROCYTE [DISTWIDTH] IN BLOOD BY AUTOMATED COUNT: 15.4 %
GLOBULIN PLAS-MCNC: 3.8 G/DL (ref 2–3.5)
GLUCOSE BLD-MCNC: 124 MG/DL (ref 70–99)
GLUCOSE BLD-MCNC: 126 MG/DL (ref 70–99)
GLUCOSE BLD-MCNC: 135 MG/DL (ref 70–99)
HCT VFR BLD AUTO: 39.4 %
HGB BLD-MCNC: 11.9 G/DL
IMM GRANULOCYTES # BLD AUTO: 0.05 X10(3) UL (ref 0–1)
IMM GRANULOCYTES NFR BLD: 0.4 %
LYMPHOCYTES # BLD AUTO: 2.84 X10(3) UL (ref 1–4)
LYMPHOCYTES NFR BLD AUTO: 25.2 %
MCH RBC QN AUTO: 29.8 PG (ref 26–34)
MCHC RBC AUTO-ENTMCNC: 30.2 G/DL (ref 31–37)
MCV RBC AUTO: 98.5 FL
MONOCYTES # BLD AUTO: 0.58 X10(3) UL (ref 0.1–1)
MONOCYTES NFR BLD AUTO: 5.2 %
NEUTROPHILS # BLD AUTO: 7.24 X10 (3) UL (ref 1.5–7.7)
NEUTROPHILS # BLD AUTO: 7.24 X10(3) UL (ref 1.5–7.7)
NEUTROPHILS NFR BLD AUTO: 64.3 %
OSMOLALITY SERPL CALC.SUM OF ELEC: 318 MOSM/KG (ref 275–295)
PLATELET # BLD AUTO: 182 10(3)UL (ref 150–450)
POTASSIUM SERPL-SCNC: 3.8 MMOL/L (ref 3.5–5.1)
PROT SERPL-MCNC: 6.8 G/DL (ref 5.7–8.2)
RBC # BLD AUTO: 4 X10(6)UL
SODIUM SERPL-SCNC: 149 MMOL/L (ref 136–145)
WBC # BLD AUTO: 11.3 X10(3) UL (ref 4–11)

## 2024-12-13 PROCEDURE — 99223 1ST HOSP IP/OBS HIGH 75: CPT | Performed by: INTERNAL MEDICINE

## 2024-12-13 RX ORDER — FAMOTIDINE 20 MG/1
20 TABLET, FILM COATED ORAL 2 TIMES DAILY
Status: DISCONTINUED | OUTPATIENT
Start: 2024-12-13 | End: 2024-12-14

## 2024-12-13 RX ORDER — SODIUM BICARBONATE 325 MG/1
325 TABLET ORAL AS NEEDED
Status: DISCONTINUED | OUTPATIENT
Start: 2024-12-13 | End: 2024-12-14

## 2024-12-13 NOTE — PLAN OF CARE
Patient on bed. Non verbal, unresponsive. Afebrile. O2 @ 2L. Frequent oral suctioning. Head of bed elevated for comfort breathing. BMP done and relayed. Scheduled meds given as per MAR. Call light within reach. Safety precautions in place. Needs anticipated and attended.       Problem: RESPIRATORY - ADULT  Goal: Achieves optimal ventilation and oxygenation  Description: INTERVENTIONS:  - Assess for changes in respiratory status  - Assess for changes in mentation and behavior  - Position to facilitate oxygenation and minimize respiratory effort  - Oxygen supplementation based on oxygen saturation or ABGs  - Provide Smoking Cessation handout, if applicable  - Encourage broncho-pulmonary hygiene including cough, deep breathe, Incentive Spirometry  - Assess the need for suctioning and perform as needed  - Assess and instruct to report SOB or any respiratory difficulty  - Respiratory Therapy support as indicated  - Manage/alleviate anxiety  - Monitor for signs/symptoms of CO2 retention  Outcome: Progressing     Problem: Patient/Family Goals  Goal: Patient/Family Long Term Goal  Description: Patient's Long Term Goal:     Interventions:  -   - See additional Care Plan goals for specific interventions  Outcome: Progressing  Goal: Patient/Family Short Term Goal  Description: Patient's Short Term Goal:     Interventions:   -   - See additional Care Plan goals for specific interventions  Outcome: Progressing

## 2024-12-13 NOTE — DIETARY NOTE
[facility]   part of [facility]    NUTRITION ASSESSMENT    Pt meets severe malnutrition criteria at this time.    CRITERIA FOR MALNUTRITION DIAGNOSIS:  Criteria for severe malnutrition diagnosis: chronic illness related to wt loss greater than 5% in 1 month and body fat severe depletion      NUTRITION INTERVENTION:    RD nutrition Care Plan- See RD nutrition assessment for additional recommendations  Enteral Nutrition - Via G Tube Recommend starting Jevity 1.5 at 25 mL/hr advancing 10 mL/hr q 4 hours to goal rate of 45 mL/hr.   This will provide 1620 kcal, 69 grams protein, 820 mL total free water, and 100% of RDI's.   Recommend 150 mL water flush q 4 hours, TF+FWF provides 1720 mL total fluids.         PATIENT STATUS: 12/13/24 consult for TF  89 female admitted from nursing home with dehydration, fever possible pneumonia. Pt here last month with Gtube placement and gtube dependent .  Pt with h/o dementia, Parkinson, non verbal .  Pt has lost wt ~ 9lbs since last admit one month ago and on Gtube feeds, noted pt dehydrated not sure how much is true wt loss vs dehydration.  May need to increase TF to 50ml/hr if wt does not improve. Pt with severe fat depletion         ANTHROPOMETRICS:  Ht:  160 cm (5' 3\")  Wt: 58.6 kg (129 lb 3.2 oz).   BMI: Body mass index is 22.89 kg/m².  IBW: 52.3 kg      WEIGHT HISTORY:   Weight loss: Yes, Severe Wt loss of 9 lbs, 6.5%, over 1 months     Wt Readings from Last 10 Encounters:   12/12/24 58.6 kg (129 lb 3.2 oz)   11/16/24 63 kg (138 lb 14.2 oz)   04/15/23 63.5 kg (139 lb 15.9 oz)   10/03/22 63.5 kg (140 lb)   09/16/22 65.7 kg (144 lb 13.5 oz)   04/10/22 70.3 kg (154 lb 15.7 oz)   01/14/22 70.3 kg (154 lb 15.7 oz)   12/01/21 70.3 kg (155 lb)   11/19/21 68 kg (150 lb)   04/09/21 70.3 kg (155 lb)        NUTRITION:  Diet:       Procedures    NPO      Food Allergies: No  Cultural/Ethnic/Catholic Preferences Addressed: Yes    Percent Meals Eaten (last 3 days)       Date/Time  Percent Meals Eaten (%)    12/12/24 1734 0 %    12/12/24 2021 0 %     Percent Meals Eaten (%): pt is NPO at 12/12/24 2021            GI system review: swallowing dysfunction Gtube dependent Last BM Date: 12/12/24  Skin and wounds: coccyx wound    NUTRITION RELATED PHYSICAL FINDINGS:     1. Body Fat/Muscle Mass: severe depletion body fat Buccal fat pad     2. Fluid Accumulation: none    NUTRITION PRESCRIPTION:  58.6 kg Actual Body Weight  Calories: 3415-3256 calories/day (25-30 kcal/kg)  Protein: 70-88 grams protein/day (1.2-1.5 grams protein per kg)  Fluid: ~1 ml/kcal or per MD discretion    NUTRITION DIAGNOSIS/PROBLEM:  Malnutrition related to inability to take or tolerate as evidenced by documented/reported unintentional weight loss and loss of muscle mass      MONITOR AND EVALUATE/NUTRITION GOALS:  Weight stable within 1 to 2 lbs during admission - New  Tolerate alternative nutrition at 100% of goal - New      MEDICATIONS:  Reviewed    LABS:  Reviewed    Pt is at High nutrition risk      China Sharp RD,LDN  Clinical Dietitian  [id number]

## 2024-12-13 NOTE — CM/SW NOTE
12/13/24 0900   CM/SW Referral Data   Referral Source Social Work (self-referral)   Reason for Referral Discharge planning   Informant EMR   Patient Info   Patient's Home Environment Long Term Care Facility   Discharge Needs   Anticipated D/C needs Long term care facility     SW noted that patient is a LTC resident at [facility]. SW sent updated clinical information to [facility] via [employer].     SW will continue to follow for plan of care changes and remain available for any additional DC needs or concerns.     Jerica Sandoval MSW, LSW  Discharge Planner   [phone]

## 2024-12-13 NOTE — PLAN OF CARE
Pt somnolent. Opens her eyes sometimes.   Resting in bed.  Turned.   Tube feeding initiated at 25 ml/hr. Advanced to 35 ml/hr at 17:30. Tolerating well.   No sings of pain. One soft BM.   Fall precautions in place. Will continue plan of care.     Problem: RESPIRATORY - ADULT  Goal: Achieves optimal ventilation and oxygenation  Description: INTERVENTIONS:  - Assess for changes in respiratory status  - Assess for changes in mentation and behavior  - Position to facilitate oxygenation and minimize respiratory effort  - Oxygen supplementation based on oxygen saturation or ABGs  - Provide Smoking Cessation handout, if applicable  - Encourage broncho-pulmonary hygiene including cough, deep breathe, Incentive Spirometry  - Assess the need for suctioning and perform as needed  - Assess and instruct to report SOB or any respiratory difficulty  - Respiratory Therapy support as indicated  - Manage/alleviate anxiety  - Monitor for signs/symptoms of CO2 retention  Outcome: Progressing

## 2024-12-13 NOTE — PROGRESS NOTES
[facility]   part of [facility]     Hospitalist Progress Note     Sultana Hess Patient Status:  Inpatient    1935 MRN [id number]   Location [facility] 4NW-A Attending Ghazal Pride,    Hosp Day # 1 PCP Aubrey Tsai     Chief Complaint: Fever     Subjective:     Patient with no fevers overnight     Objective:    Review of Systems:   Unable to obtain     Vital signs:  Temp:  [97 °F (36.1 °C)-100.7 °F (38.2 °C)] 97.8 °F (36.6 °C)  Pulse:  [] 89  Resp:  [18-32] 20  BP: ()/(51-71) 115/63  SpO2:  [96 %-100 %] 96 %    Physical Exam:    General: No acute distress  Respiratory: No wheezes, no rhonchi  Cardiovascular: S1, S2, regular rate and rhythm  Abdomen: Soft, Non-tender, non-distended, positive bowel sounds  Neuro: No new focal deficits.   Extremities: No edema      Diagnostic Data:    Labs:  Recent Labs   Lab 24  1305 24  0615   WBC 12.3* 11.3*   HGB 14.3 11.9*   MCV 97.2 98.5   .0 182.0       Recent Labs   Lab 24  1305 24  2036 24  2149   * 1,186* 144*   BUN 49* 30* 37*   CREATSERUM 0.90 1.06* 0.71   CA 9.0 5.6* 7.9*   ALB 3.6  --   --    * 116* 152*   K 4.6 4.0 4.7   * 86* 118*   CO2 31.0 21.0 28.0   ALKPHO 69  --   --    AST 30  --   --    ALT 29  --   --    BILT 0.4  --   --    TP 7.9  --   --        Estimated Creatinine Clearance: 46.2 mL/min (based on SCr of 0.71 mg/dL).    No results for input(s): \"TROP\", \"TROPHS\", \"CK\" in the last 168 hours.    No results for input(s): \"PTP\", \"INR\" in the last 168 hours.               Microbiology    No results found for this visit on 24.      Imaging: Reviewed in Epic.    Medications:    piperacillin-tazobactam  3.375 g Intravenous Q8H    lactulose  30 mL Per G Tube Daily       Assessment & Plan:      #Sepsis with acute hypoxic respiratory failure d/t presumed pneumonia, aspiration - on Augmentin PTA   -Zosyn  -Follow cultures    #Hypernatremia  -adjust free water flushes      #Failure to thrive/Severe Malnutrition s/p PEG  #Advanced dementia and Parkinson's Disease     #Parkinson's Disease  -Sinemet was recently stopped      #Hypertension  -Amlodipine   -Metoprolol  (PTA meds on hold, monitor hemodynamics)      #Depression  #KATERINA Pride, DO    Supplementary Documentation:     Quality:  DVT Mechanical Prophylaxis:   SCDs,    DVT Pharmacologic Prophylaxis   Medication   None                Code Status: DNAR/Selective Treatment  [place]: No urinary catheter in place      The 21st Century Cures Act makes medical notes like these available to patients in the interest of transparency. Please be advised this is a medical document. Medical documents are intended to carry relevant information, facts as evident, and the clinical opinion of the practitioner. The medical note is intended as peer to peer communication and may appear blunt or direct. It is written in medical language and may contain abbreviations or verbiage that are unfamiliar.              **Certification      PHYSICIAN Certification of Need for Inpatient Hospitalization - Initial Certification    Patient will require inpatient services that will reasonably be expected to span two midnight's based on the clinical documentation in H+P.   Based on patients current state of illness, I anticipate that, after discharge, patient will require TBD.

## 2024-12-14 VITALS
TEMPERATURE: 99 F | RESPIRATION RATE: 30 BRPM | OXYGEN SATURATION: 94 % | HEART RATE: 109 BPM | WEIGHT: 129.19 LBS | DIASTOLIC BLOOD PRESSURE: 58 MMHG | SYSTOLIC BLOOD PRESSURE: 123 MMHG | BODY MASS INDEX: 23 KG/M2

## 2024-12-14 LAB
ANION GAP SERPL CALC-SCNC: 6 MMOL/L (ref 0–18)
BUN BLD-MCNC: 24 MG/DL (ref 9–23)
CALCIUM BLD-MCNC: 8.2 MG/DL (ref 8.7–10.4)
CHLORIDE SERPL-SCNC: 112 MMOL/L (ref 98–112)
CO2 SERPL-SCNC: 25 MMOL/L (ref 21–32)
CREAT BLD-MCNC: 0.64 MG/DL
EGFRCR SERPLBLD CKD-EPI 2021: 85 ML/MIN/1.73M2 (ref 60–?)
ERYTHROCYTE [DISTWIDTH] IN BLOOD BY AUTOMATED COUNT: 14.9 %
GLUCOSE BLD-MCNC: 162 MG/DL (ref 70–99)
GLUCOSE BLD-MCNC: 179 MG/DL (ref 70–99)
HCT VFR BLD AUTO: 37.1 %
HGB BLD-MCNC: 11.9 G/DL
MAGNESIUM SERPL-MCNC: 2 MG/DL (ref 1.6–2.6)
MCH RBC QN AUTO: 30.6 PG (ref 26–34)
MCHC RBC AUTO-ENTMCNC: 32.1 G/DL (ref 31–37)
MCV RBC AUTO: 95.4 FL
OSMOLALITY SERPL CALC.SUM OF ELEC: 304 MOSM/KG (ref 275–295)
PLATELET # BLD AUTO: 177 10(3)UL (ref 150–450)
POTASSIUM SERPL-SCNC: 3.6 MMOL/L (ref 3.5–5.1)
RBC # BLD AUTO: 3.89 X10(6)UL
SODIUM SERPL-SCNC: 143 MMOL/L (ref 136–145)
WBC # BLD AUTO: 8.3 X10(3) UL (ref 4–11)

## 2024-12-14 PROCEDURE — 99239 HOSP IP/OBS DSCHRG MGMT >30: CPT | Performed by: INTERNAL MEDICINE

## 2024-12-14 NOTE — DISCHARGE INSTRUCTIONS
Tube Feeding Formula Jevity 1.5   Tube Feeding Method: Continuous   Tube Feeding Instructions 45ml/hr   water flush 150   flush frequency Every 4 hours

## 2024-12-14 NOTE — CM/SW NOTE
12/14/24 1521   Discharge disposition   Expected discharge disposition Long Term Ca   Post Acute Care Provider   ([facility])   Discharge transportation [facility]     RN informed SW pt cleared to discharge today. DANIEL spoke w/ Ifeoma from [facility] and confirmed they'll be ready for pt today at 515pm. DANIEL spoke w/ Rafael from [facility] and arranged S ambulance for 515pm  to transport pt to [facility]. PCS form completed (bedbound, dementia/confused, nonverbal).     DANIEL updated pt's son, Jimenez, regarding discharge time. DANIEL/MEGAN to remain available for support and/or discharge planning.     RN to call report to [facility] at [phone]  [facility] [phone]    TENA Olson - [phone]

## 2024-12-14 NOTE — PLAN OF CARE
Problem: RESPIRATORY - ADULT  Goal: Achieves optimal ventilation and oxygenation  Description: INTERVENTIONS:  - Assess for changes in respiratory status  - Assess for changes in mentation and behavior  - Position to facilitate oxygenation and minimize respiratory effort  - Oxygen supplementation based on oxygen saturation or ABGs  - Provide Smoking Cessation handout, if applicable  - Encourage broncho-pulmonary hygiene including cough, deep breathe, Incentive Spirometry  - Assess the need for suctioning and perform as needed  - Assess and instruct to report SOB or any respiratory difficulty  - Respiratory Therapy support as indicated  - Manage/alleviate anxiety  - Monitor for signs/symptoms of CO2 retention  Outcome: Progressing          Pt nonverbal, somnolent, on bedrest. VSS, RA, Repositioned. Tube feeding advanced to 45ml/hr. One soft BM. Mepilex dressing changed. Fall precautions in place.

## 2024-12-14 NOTE — PLAN OF CARE
Pt lethargic. Opening eyes sometimes.   VSS. Tachypneic, shallow breathing.   PEG tube intact. Jevity 1.5 at 45 ml/hr.   Flushes 150 ml q4hrs.   Resting in bed. Fall precautions in place.           NURSING DISCHARGE NOTE    Discharged Nursing home via Ambulance.  Accompanied by Support staff  Belongings Taken by patient/family.  Report called to Liz FRIEND.         Problem: RESPIRATORY - ADULT  Goal: Achieves optimal ventilation and oxygenation  Description: INTERVENTIONS:  - Assess for changes in respiratory status  - Assess for changes in mentation and behavior  - Position to facilitate oxygenation and minimize respiratory effort  - Oxygen supplementation based on oxygen saturation or ABGs  - Provide Smoking Cessation handout, if applicable  - Encourage broncho-pulmonary hygiene including cough, deep breathe, Incentive Spirometry  - Assess the need for suctioning and perform as needed  - Assess and instruct to report SOB or any respiratory difficulty  - Respiratory Therapy support as indicated  - Manage/alleviate anxiety  - Monitor for signs/symptoms of CO2 retention  Outcome: Progressing

## 2024-12-14 NOTE — DISCHARGE SUMMARY
[place] HOSPITALIST  DISCHARGE SUMMARY     Sultana Hess Patient Status:  Inpatient    1935 MRN [id number]   Location [facility] 4NW-A Attending Ghazal Pride,    Hosp Day # 2 PCP Aubrey Tsai     Date of Admission: 2024  Date of Discharge:   2024    Discharge Disposition: SNF Long Term Care ([facility])    Discharge Diagnosis:  #Sepsis with acute hypoxic respiratory failure d/t presumed pneumonia, aspiration - on Augmentin PTA   -Zosyn -> Augmentin   -Follow cultures     #Hypernatremia  -corrected     #Failure to thrive/Severe Malnutrition s/p PEG  #Advanced dementia and Parkinson's Disease      #Parkinson's Disease  -Sinemet was recently stopped      #Hypertension  -Amlodipine   -Metoprolol  (PTA meds on hold, monitor hemodynamics)      #Depression  #GERD    History of Present Illness:      Sultana Hess is a 89 year old female with advanced dementia (nonverbal), Parkinson's disease (contracted RUE), depression, essential hypertension, dyslipidemia, dysphagia sp PEG who was brought to the ER d/t tachypnea and fever. Patient recently diagnosed with bronchitis. She was noted to be tachypneic , hypoxic and febrile for which she was brought to the ER. Patient nonverbal.     Brief Synopsis: Patient was admitted, defervesced, transition back to Augmentin, no recurrence of any fevers, to complete course of Augmentin as was previously prescribed, being discharged back to nursing facility stable condition.    Lace+ Score: 70  59-90 High Risk  29-58 Medium Risk  0-28   Low Risk       TCM Follow-Up Recommendation:  LACE > 58: High Risk of readmission after discharge from the hospital.      Procedures during hospitalization:   N/a    Incidental or significant findings and recommendations (brief descriptions):  N/a    Lab/Test results pending at Discharge:   N/a    Consultants:  N/a    Discharge Medication List:     Discharge Medications        CONTINUE taking these medications        Instructions  Prescription details   acetaminophen 325 MG Tabs  Commonly known as: Tylenol      2 tablets (650 mg total) by Per G Tube route every 6 (six) hours as needed for Pain.   Refills: 0     acetaminophen 325 MG Tabs  Commonly known as: Tylenol      2 tablets (650 mg total) by Per G Tube route 2 (two) times daily.   Refills: 0     amLODIPine 5 MG Tabs  Commonly known as: Norvasc      1 tablet (5 mg total) by Per G Tube route daily.   Refills: 0     amoxicillin clavulanate 875-125 MG Tabs  Commonly known as: Augmentin      Take 1 tablet by mouth 2 (two) times daily.   Stop taking on: December 17, 2024  Refills: 0     famotidine 20 MG Tabs  Commonly known as: Pepcid      1 tablet (20 mg total) by Per G Tube route 2 (two) times daily.   Refills: 5     hydrocortisone 1 % Crea      Apply 1 Application topically daily as needed (dermatitis).   Refills: 0     lactulose 20 GM/30ML Soln  Commonly known as: ENULOSE      30 mL (20 g total) by Per G Tube route daily.   Refills: 0     loratadine 10 MG Tabs  Commonly known as: Claritin      1 tablet (10 mg total) by Per G Tube route daily.   Refills: 0     metoprolol tartrate 25 MG Tabs  Commonly known as: Lopressor      2 tablets (50 mg total) by Per G Tube route 2 (two) times daily.   Refills: 0     pancrelipase (Lip-Prot-Amyl) 15207-94624 units Cpep  Commonly known as: Zenpep      1 capsule (10,000 Units total) by Per G Tube route as needed (to unclog non-patent tubes).   Quantity: 270 capsule  Refills: 0     polyethylene glycol (PEG 3350) 17 g Pack  Commonly known as: Miralax      17 g by Per G Tube route daily.   Refills: 0     sennosides 8.6 MG Tabs  Commonly known as: Senokot      2 tablets (17.2 mg total) by Per G Tube route daily as needed for constipation.   Refills: 0     sodium bicarbonate 325 MG Tabs      1 tablet (325 mg total) by Per G Tube route as needed (to unclog non-patent tubes).   Stop taking on: December 24, 2024  Quantity: 30 tablet  Refills: 0            ASK  your doctor about these medications        Instructions Prescription details   carbidopa-levodopa  MG Tabs  Commonly known as: SINEMET      Take 2 tablets by mouth 3 (three) times daily.   Refills: 0              [place] n[place]    Follow-up appointment:   No follow-up provider specified.  Appointments for Next 30 Days 2024 - 2025      None            Vital signs:  Temp:  [97.4 °F (36.3 °C)-99.2 °F (37.3 °C)] 99.2 °F (37.3 °C)  Pulse:  [] 109  Resp:  [18-38] 30  BP: (103-133)/(52-66) 123/58  SpO2:  [94 %-98 %] 94 %    Physical Exam:    General: No acute distress   Lungs: clear to auscultation  Cardiovascular: S1, S2  Abdomen: Soft      -----------------------------------------------------------------------------------------------  PATIENT DISCHARGE INSTRUCTIONS: See electronic chart    Ghazal Pride DO    Total time spent on discharge plannin minutes     The  [employer] C[employer] Act makes medical notes like these available to patients in the interest of transparency. Please be advised this is a medical document. Medical documents are intended to carry relevant information, facts as evident, and the clinical opinion of the practitioner. The medical note is intended as peer to peer communication and may appear blunt or direct. It is written in medical language and may contain abbreviations or verbiage that are unfamiliar.

## 2024-12-16 ENCOUNTER — HOSPITAL ENCOUNTER (INPATIENT)
Facility: HOSPITAL | Age: 89
LOS: 2 days | Discharge: NURSING, REHAB OR LONG-TERM CARE | End: 2024-12-18
Attending: EMERGENCY MEDICINE | Admitting: HOSPITALIST
Payer: MEDICARE

## 2024-12-16 ENCOUNTER — APPOINTMENT (OUTPATIENT)
Dept: CT IMAGING | Facility: HOSPITAL | Age: 89
End: 2024-12-16
Attending: EMERGENCY MEDICINE
Payer: MEDICARE

## 2024-12-16 DIAGNOSIS — K56.41 FECAL IMPACTION (HCC): Primary | ICD-10-CM

## 2024-12-16 LAB
ALBUMIN SERPL-MCNC: 3.1 G/DL (ref 3.2–4.8)
ALBUMIN/GLOB SERPL: 0.9 {RATIO} (ref 1–2)
ALP LIVER SERPL-CCNC: 61 U/L
ALT SERPL-CCNC: 18 U/L
ANION GAP SERPL CALC-SCNC: 4 MMOL/L (ref 0–18)
AST SERPL-CCNC: 29 U/L (ref ?–34)
ATRIAL RATE: 91 BPM
BASOPHILS # BLD AUTO: 0.02 X10(3) UL (ref 0–0.2)
BASOPHILS NFR BLD AUTO: 0.2 %
BILIRUB SERPL-MCNC: 0.2 MG/DL (ref 0.2–1.1)
BILIRUB UR QL STRIP.AUTO: NEGATIVE
BUN BLD-MCNC: 19 MG/DL (ref 9–23)
C DIFF TOX B STL QL: NEGATIVE
CALCIUM BLD-MCNC: 8.5 MG/DL (ref 8.7–10.4)
CHLORIDE SERPL-SCNC: 110 MMOL/L (ref 98–112)
CLARITY UR REFRACT.AUTO: CLEAR
CO2 SERPL-SCNC: 30 MMOL/L (ref 21–32)
COLOR UR AUTO: YELLOW
CREAT BLD-MCNC: 0.54 MG/DL
EGFRCR SERPLBLD CKD-EPI 2021: 89 ML/MIN/1.73M2 (ref 60–?)
EOSINOPHIL # BLD AUTO: 0.39 X10(3) UL (ref 0–0.7)
EOSINOPHIL NFR BLD AUTO: 3.8 %
ERYTHROCYTE [DISTWIDTH] IN BLOOD BY AUTOMATED COUNT: 15.4 %
GLOBULIN PLAS-MCNC: 3.4 G/DL (ref 2–3.5)
GLUCOSE BLD-MCNC: 103 MG/DL (ref 70–99)
GLUCOSE UR STRIP.AUTO-MCNC: NORMAL MG/DL
HCT VFR BLD AUTO: 38 %
HGB BLD-MCNC: 11.8 G/DL
IMM GRANULOCYTES # BLD AUTO: 0.06 X10(3) UL (ref 0–1)
IMM GRANULOCYTES NFR BLD: 0.6 %
LEUKOCYTE ESTERASE UR QL STRIP.AUTO: NEGATIVE
LYMPHOCYTES # BLD AUTO: 2.37 X10(3) UL (ref 1–4)
LYMPHOCYTES NFR BLD AUTO: 22.9 %
MCH RBC QN AUTO: 30 PG (ref 26–34)
MCHC RBC AUTO-ENTMCNC: 31.1 G/DL (ref 31–37)
MCV RBC AUTO: 96.7 FL
MONOCYTES # BLD AUTO: 0.59 X10(3) UL (ref 0.1–1)
MONOCYTES NFR BLD AUTO: 5.7 %
NEUTROPHILS # BLD AUTO: 6.9 X10 (3) UL (ref 1.5–7.7)
NEUTROPHILS # BLD AUTO: 6.9 X10(3) UL (ref 1.5–7.7)
NEUTROPHILS NFR BLD AUTO: 66.8 %
NITRITE UR QL STRIP.AUTO: NEGATIVE
OSMOLALITY SERPL CALC.SUM OF ELEC: 301 MOSM/KG (ref 275–295)
P AXIS: 22 DEGREES
P-R INTERVAL: 140 MS
PH UR STRIP.AUTO: 5 [PH] (ref 5–8)
PLATELET # BLD AUTO: 218 10(3)UL (ref 150–450)
POTASSIUM SERPL-SCNC: 4 MMOL/L (ref 3.5–5.1)
PROT SERPL-MCNC: 6.5 G/DL (ref 5.7–8.2)
Q-T INTERVAL: 362 MS
QRS DURATION: 68 MS
QTC CALCULATION (BEZET): 445 MS
R AXIS: -7 DEGREES
RBC # BLD AUTO: 3.93 X10(6)UL
RBC #/AREA URNS AUTO: >10 /HPF
RBC UR QL AUTO: NEGATIVE
SODIUM SERPL-SCNC: 144 MMOL/L (ref 136–145)
SP GR UR STRIP.AUTO: 1.03 (ref 1–1.03)
T AXIS: 34 DEGREES
UROBILINOGEN UR STRIP.AUTO-MCNC: 3 MG/DL
VENTRICULAR RATE: 91 BPM
WBC # BLD AUTO: 10.3 X10(3) UL (ref 4–11)

## 2024-12-16 PROCEDURE — 74177 CT ABD & PELVIS W/CONTRAST: CPT | Performed by: EMERGENCY MEDICINE

## 2024-12-16 PROCEDURE — 99222 1ST HOSP IP/OBS MODERATE 55: CPT | Performed by: STUDENT IN AN ORGANIZED HEALTH CARE EDUCATION/TRAINING PROGRAM

## 2024-12-16 RX ORDER — BISACODYL 10 MG
10 SUPPOSITORY, RECTAL RECTAL
Status: DISCONTINUED | OUTPATIENT
Start: 2024-12-16 | End: 2024-12-16

## 2024-12-16 RX ORDER — AMOXICILLIN AND CLAVULANATE POTASSIUM 600; 42.9 MG/5ML; MG/5ML
875 POWDER, FOR SUSPENSION ORAL 2 TIMES DAILY
Status: COMPLETED | OUTPATIENT
Start: 2024-12-16 | End: 2024-12-17

## 2024-12-16 RX ORDER — ACETAMINOPHEN 500 MG
1000 TABLET ORAL EVERY 8 HOURS PRN
Status: DISCONTINUED | OUTPATIENT
Start: 2024-12-16 | End: 2024-12-18

## 2024-12-16 RX ORDER — SENNOSIDES 8.6 MG
8.6 TABLET ORAL 2 TIMES DAILY
Status: DISCONTINUED | OUTPATIENT
Start: 2024-12-16 | End: 2024-12-18

## 2024-12-16 RX ORDER — SODIUM PHOSPHATE, DIBASIC AND SODIUM PHOSPHATE, MONOBASIC 7; 19 G/230ML; G/230ML
1 ENEMA RECTAL ONCE AS NEEDED
Status: DISCONTINUED | OUTPATIENT
Start: 2024-12-16 | End: 2024-12-18

## 2024-12-16 RX ORDER — SODIUM CHLORIDE 9 MG/ML
INJECTION, SOLUTION INTRAVENOUS CONTINUOUS
Status: ACTIVE | OUTPATIENT
Start: 2024-12-16 | End: 2024-12-17

## 2024-12-16 RX ORDER — ECHINACEA PURPUREA EXTRACT 125 MG
1 TABLET ORAL
Status: DISCONTINUED | OUTPATIENT
Start: 2024-12-16 | End: 2024-12-18

## 2024-12-16 RX ORDER — SODIUM CHLORIDE 9 MG/ML
INJECTION, SOLUTION INTRAVENOUS CONTINUOUS
Status: DISCONTINUED | OUTPATIENT
Start: 2024-12-16 | End: 2024-12-16

## 2024-12-16 RX ORDER — CETIRIZINE HYDROCHLORIDE 10 MG/1
10 TABLET ORAL DAILY
Status: DISCONTINUED | OUTPATIENT
Start: 2024-12-17 | End: 2024-12-16 | Stop reason: SDUPTHER

## 2024-12-16 RX ORDER — LACTULOSE 10 G/15ML
30 SOLUTION ORAL DAILY
Status: DISCONTINUED | OUTPATIENT
Start: 2024-12-16 | End: 2024-12-18

## 2024-12-16 RX ORDER — BISACODYL 10 MG
10 SUPPOSITORY, RECTAL RECTAL ONCE
Status: COMPLETED | OUTPATIENT
Start: 2024-12-16 | End: 2024-12-16

## 2024-12-16 RX ORDER — ONDANSETRON 2 MG/ML
4 INJECTION INTRAMUSCULAR; INTRAVENOUS EVERY 6 HOURS PRN
Status: DISCONTINUED | OUTPATIENT
Start: 2024-12-16 | End: 2024-12-18

## 2024-12-16 RX ORDER — POLYETHYLENE GLYCOL 3350 17 G/17G
17 POWDER, FOR SOLUTION ORAL DAILY
Status: DISCONTINUED | OUTPATIENT
Start: 2024-12-16 | End: 2024-12-16

## 2024-12-16 RX ORDER — METOCLOPRAMIDE HYDROCHLORIDE 5 MG/ML
10 INJECTION INTRAMUSCULAR; INTRAVENOUS EVERY 8 HOURS PRN
Status: DISCONTINUED | OUTPATIENT
Start: 2024-12-16 | End: 2024-12-18

## 2024-12-16 RX ORDER — POLYETHYLENE GLYCOL 3350 17 G/17G
17 POWDER, FOR SOLUTION ORAL DAILY PRN
Status: DISCONTINUED | OUTPATIENT
Start: 2024-12-16 | End: 2024-12-18

## 2024-12-16 RX ORDER — CETIRIZINE HYDROCHLORIDE 5 MG/1
5 TABLET ORAL DAILY
Status: DISCONTINUED | OUTPATIENT
Start: 2024-12-17 | End: 2024-12-18

## 2024-12-16 RX ORDER — BENZONATATE 100 MG/1
200 CAPSULE ORAL 3 TIMES DAILY PRN
Status: DISCONTINUED | OUTPATIENT
Start: 2024-12-16 | End: 2024-12-18

## 2024-12-16 RX ORDER — BISACODYL 10 MG
10 SUPPOSITORY, RECTAL RECTAL
Status: DISCONTINUED | OUTPATIENT
Start: 2024-12-16 | End: 2024-12-18

## 2024-12-16 RX ORDER — FAMOTIDINE 20 MG/1
20 TABLET, FILM COATED ORAL 2 TIMES DAILY
Status: DISCONTINUED | OUTPATIENT
Start: 2024-12-16 | End: 2024-12-18

## 2024-12-16 RX ORDER — POLYETHYLENE GLYCOL 3350 17 G/17G
17 POWDER, FOR SOLUTION ORAL DAILY
Status: DISCONTINUED | OUTPATIENT
Start: 2024-12-16 | End: 2024-12-18

## 2024-12-16 RX ORDER — SENNOSIDES 8.6 MG
17.2 TABLET ORAL NIGHTLY PRN
Status: DISCONTINUED | OUTPATIENT
Start: 2024-12-16 | End: 2024-12-18

## 2024-12-16 RX ORDER — ENOXAPARIN SODIUM 100 MG/ML
40 INJECTION SUBCUTANEOUS DAILY
Status: DISCONTINUED | OUTPATIENT
Start: 2024-12-17 | End: 2024-12-18

## 2024-12-16 NOTE — ED INITIAL ASSESSMENT (HPI)
EMS reports that they were called for transport from SNF due to abnormal abdominal X Ray results that were taken this morning due to abdominal distention. PT is non verbal.

## 2024-12-16 NOTE — ED PROVIDER NOTES
Patient Seen in: [facility] Emergency Department      History     Chief Complaint   Patient presents with    Abdomen/Flank Pain     Abd distention     Stated Complaint: abd pn    Subjective:   HPI      89-year-old female who is nonverbal sent over from nursing home after having imaging because of a distended abdomen she has KUB which showed distended loops of large and small bowel and radiopaque contrast in the anorectal area.  Patient has a feeding tube she has a history of dementia unable to give any further history this history was obtained through the EMS transfer sheet.    Objective:     Past Medical History:    Anemia    Cognitive communication deficit    COVID-19 virus infection    Dementia (HCC)    Depression    Dysphagia    Esophageal reflux    Essential hypertension    High blood pressure    Hyperlipidemia    Obesity    Osteoarthritis    Parkinson disease (HCC)    Prediabetes    Prediabetes    Unspecified fracture of shaft of humerus, right arm, sequela              Past Surgical History:   Procedure Laterality Date    Gastrostomy tube placement      Knee replacement surgery Bilateral                       Social History     Socioeconomic History    Marital status:    Tobacco Use    Smoking status: Never    Smokeless tobacco: Never   Vaping Use    Vaping status: Never Used   Substance and Sexual Activity    Alcohol use: No    Drug use: No   Other Topics Concern    Caffeine Concern Yes     Comment: 1 cup of tea daily    Exercise No     Social Drivers of Health     Food Insecurity: No Food Insecurity (2024)    Food Insecurity     Food Insecurity: Never true   Transportation Needs: No Transportation Needs (2024)    Transportation Needs     Lack of Transportation: No   Housing Stability: Low Risk  (2024)    Housing Stability     Housing Instability: No                  Physical Exam     ED Triage Vitals [24 1513]   /68   Pulse 93   Resp 26   Temp 98.3 °F (36.8  °C)   Temp src Temporal   SpO2 100 %   O2 Device None (Room air)       Current Vitals:   Vital Signs  BP: 121/71  Pulse: 88  Resp: 17  Temp: 98.3 °F (36.8 °C)  Temp src: Temporal  MAP (mmHg): 88    Oxygen Therapy  SpO2: 100 %  O2 Device: Nasal cannula  O2 Flow Rate (L/min): 2 L/min        Physical Exam  Patient's eyes are open does moved to look at you when you talk to her but does not respond in any other way.  Oral mucosa is very dry pupils are 2 mm and reactive to light extraocular muscles grossly intact the neck is no JVD lungs are clear cardiovascular exam shows regular rate and rhythm without murmurs abdomen is moderately distended bowel sounds are somewhat hypoactive there is no rebound.  Extremities there is contractures in the extremities.  Neurologic exam the patient is nonverbal lethargic unclear if this is her baseline she with draws slightly to uncomfortable stimuli in the right foot with only slight movement of the toes and then there was some spontaneous movement of the left arm very minimal but appears to have contractures in both hands no movement was able to be elicited from the left leg.  Patient cannot follow commands.    ED Course     Labs Reviewed   COMP METABOLIC PANEL (14) - Abnormal; Notable for the following components:       Result Value    Glucose 103 (*)     Creatinine 0.54 (*)     Calcium, Total 8.5 (*)     Calculated Osmolality 301 (*)     Albumin 3.1 (*)     A/G Ratio 0.9 (*)     All other components within normal limits   CBC WITH DIFFERENTIAL WITH PLATELET - Abnormal; Notable for the following components:    HGB 11.8 (*)     All other components within normal limits   URINALYSIS WITH CULTURE REFLEX - Abnormal; Notable for the following components:    Ketones Urine Trace (*)     Protein Urine Trace (*)     Urobilinogen Urine 3 (*)     RBC Urine >10 (*)     All other components within normal limits   C. DIFFICILE(TOXIGENIC)PCR - Normal   RAINBOW DRAW LAVENDER   RAINBOW DRAW LIGHT  GREEN   RAINBOW DRAW BLUE   GI STOOL PANEL BY PCR     EKG    Rate, intervals and axes as noted on EKG Report.  Rate: 91  Rhythm: Sinus Rhythm  Reading: Inferior infarct age undetermined this is an abnormal EKG           Abnormal Labs Reviewed   COMP METABOLIC PANEL (14) - Abnormal; Notable for the following components:       Result Value    Glucose 103 (*)     Creatinine 0.54 (*)     Calcium, Total 8.5 (*)     Calculated Osmolality 301 (*)     Albumin 3.1 (*)     A/G Ratio 0.9 (*)     All other components within normal limits   CBC WITH DIFFERENTIAL WITH PLATELET - Abnormal; Notable for the following components:    HGB 11.8 (*)     All other components within normal limits   URINALYSIS WITH CULTURE REFLEX - Abnormal; Notable for the following components:    Ketones Urine Trace (*)     Protein Urine Trace (*)     Urobilinogen Urine 3 (*)     RBC Urine >10 (*)     All other components within normal limits     Hemoglobin is 11.8       CT ABDOMEN+PELVIS(CONTRAST ONLY)(CPT=74177)    Result Date: 12/16/2024  CONCLUSION:  1. Very large amount of retained fecal debris is noted in colon consistent with fecal impaction and constipation.  There is also retained contrast. 2. There is a gastrostomy tube in appropriate position. 3. 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   LOCATION:     Dictated by (CST): Eric Pickering MD on 12/16/2024 at 6:09 PM     Finalized by (CST): Eric Pickering MD on 12/16/2024 at 6:13 PM       XR CHEST AP PORTABLE  (CPT=71045)    Images independently reviewed there is a large amount of retained fecal debris consistent with fecal impaction constipation.  Rectal exam was done.  Patient had a large amount of liquid stool in the diaper this was slightly formed brownish heme-negative there was a firm stool within the rectal which I disimpacted broke apart.  Patient was given a suppository.          MDM      Initial differential diagnosis considered but not limited to includes bowel obstruction fecal impaction sterile coral colitis Case discussed with GI the patient I did attempt a fecal disimpacted GI felt the patient should be admitted for serial enemas due to large amount of stool noted on the CT scan.    Admission disposition: 12/16/2024  7:16 PM           Medical Decision Making      Disposition and Plan     Clinical Impression:  1. Fecal impaction (HCC)         Disposition:  Admit  12/16/2024  7:16 pm    Follow-up:  No follow-up provider specified.        Medications Prescribed:  Current Discharge Medication List              Supplementary Documentation:         Hospital Problems       Present on Admission  Date Reviewed: 11/22/2024            ICD-10-CM Noted POA    * (Principal) Fecal impaction (HCC) K56.41 12/16/2024 Unknown

## 2024-12-17 LAB
ADENOVIRUS F 40/41 PCR: NEGATIVE
ANION GAP SERPL CALC-SCNC: 5 MMOL/L (ref 0–18)
ASTROVIRUS PCR: NEGATIVE
BASOPHILS # BLD AUTO: 0.02 X10(3) UL (ref 0–0.2)
BASOPHILS NFR BLD AUTO: 0.3 %
BUN BLD-MCNC: 13 MG/DL (ref 9–23)
C CAYETANENSIS DNA SPEC QL NAA+PROBE: NEGATIVE
CALCIUM BLD-MCNC: 8.4 MG/DL (ref 8.7–10.4)
CAMPY SP DNA.DIARRHEA STL QL NAA+PROBE: NEGATIVE
CHLORIDE SERPL-SCNC: 113 MMOL/L (ref 98–112)
CO2 SERPL-SCNC: 24 MMOL/L (ref 21–32)
CREAT BLD-MCNC: 0.37 MG/DL
CRYPTOSP DNA SPEC QL NAA+PROBE: NEGATIVE
EAEC PAA PLAS AGGR+AATA ST NAA+NON-PRB: NEGATIVE
EC STX1+STX2 + H7 FLIC SPEC NAA+PROBE: NEGATIVE
EGFRCR SERPLBLD CKD-EPI 2021: 98 ML/MIN/1.73M2 (ref 60–?)
ENTAMOEBA HISTOLYTICA PCR: NEGATIVE
EOSINOPHIL # BLD AUTO: 0.46 X10(3) UL (ref 0–0.7)
EOSINOPHIL NFR BLD AUTO: 6.1 %
EPEC EAE GENE STL QL NAA+NON-PROBE: NEGATIVE
ERYTHROCYTE [DISTWIDTH] IN BLOOD BY AUTOMATED COUNT: 15.4 %
ETEC LTA+ST1A+ST1B TOX ST NAA+NON-PROBE: NEGATIVE
GIARDIA LAMBLIA PCR: NEGATIVE
GLUCOSE BLD-MCNC: 93 MG/DL (ref 70–99)
HCT VFR BLD AUTO: 35.6 %
HGB BLD-MCNC: 11.1 G/DL
IMM GRANULOCYTES # BLD AUTO: 0.02 X10(3) UL (ref 0–1)
IMM GRANULOCYTES NFR BLD: 0.3 %
LYMPHOCYTES # BLD AUTO: 2.03 X10(3) UL (ref 1–4)
LYMPHOCYTES NFR BLD AUTO: 26.9 %
MAGNESIUM SERPL-MCNC: 1.8 MG/DL (ref 1.6–2.6)
MCH RBC QN AUTO: 30.4 PG (ref 26–34)
MCHC RBC AUTO-ENTMCNC: 31.2 G/DL (ref 31–37)
MCV RBC AUTO: 97.5 FL
MONOCYTES # BLD AUTO: 0.44 X10(3) UL (ref 0.1–1)
MONOCYTES NFR BLD AUTO: 5.8 %
NEUTROPHILS # BLD AUTO: 4.57 X10 (3) UL (ref 1.5–7.7)
NEUTROPHILS # BLD AUTO: 4.57 X10(3) UL (ref 1.5–7.7)
NEUTROPHILS NFR BLD AUTO: 60.6 %
NOROVIRUS GI/GII PCR: NEGATIVE
OSMOLALITY SERPL CALC.SUM OF ELEC: 294 MOSM/KG (ref 275–295)
P SHIGELLOIDES DNA STL QL NAA+PROBE: NEGATIVE
PLATELET # BLD AUTO: 191 10(3)UL (ref 150–450)
POTASSIUM SERPL-SCNC: 3.7 MMOL/L (ref 3.5–5.1)
RBC # BLD AUTO: 3.65 X10(6)UL
ROTAVIRUS A PCR: NEGATIVE
SALMONELLA DNA SPEC QL NAA+PROBE: NEGATIVE
SAPOVIRUS PCR: NEGATIVE
SHIGELLA SP+EIEC IPAH ST NAA+NON-PROBE: NEGATIVE
SODIUM SERPL-SCNC: 142 MMOL/L (ref 136–145)
V CHOLERAE DNA SPEC QL NAA+PROBE: NEGATIVE
VIBRIO DNA SPEC NAA+PROBE: NEGATIVE
WBC # BLD AUTO: 7.5 X10(3) UL (ref 4–11)
YERSINIA DNA SPEC NAA+PROBE: NEGATIVE

## 2024-12-17 PROCEDURE — 99232 SBSQ HOSP IP/OBS MODERATE 35: CPT | Performed by: INTERNAL MEDICINE

## 2024-12-17 RX ORDER — METOPROLOL TARTRATE 50 MG
50 TABLET ORAL 2 TIMES DAILY
Status: DISCONTINUED | OUTPATIENT
Start: 2024-12-17 | End: 2024-12-18

## 2024-12-17 RX ORDER — MAGNESIUM SULFATE HEPTAHYDRATE 40 MG/ML
2 INJECTION, SOLUTION INTRAVENOUS ONCE
Status: COMPLETED | OUTPATIENT
Start: 2024-12-17 | End: 2024-12-17

## 2024-12-17 RX ORDER — SODIUM BICARBONATE 325 MG/1
325 TABLET ORAL AS NEEDED
Status: DISCONTINUED | OUTPATIENT
Start: 2024-12-17 | End: 2024-12-18

## 2024-12-17 RX ORDER — SODIUM CHLORIDE 9 MG/ML
INJECTION, SOLUTION INTRAVENOUS CONTINUOUS
Status: DISCONTINUED | OUTPATIENT
Start: 2024-12-17 | End: 2024-12-18

## 2024-12-17 NOTE — CONSULTS
Consultation Note        Sultana Hess Patient Status:  Inpatient    1935 MRN [id number]   Location [facility] 3NW-A Attending Tj Goldman,    Hosp Day # 1 PCP Aubrey Tsai       Reason for Consultation   Fecal impaction        History of Present Illness     Sultana Hess is an 89 year old female with Pmhx of Parkinson's disease, dementia, GERD who presented to the ER with abdominal distention. History is limited, patient is nonverbal. Consult note HPI completed through chart review. She was recently seen by our service for a PEG consult. PEG attempted on  but poor illumination so then placed by IR. She was then recently admitted - due to PNA. Abdominal distention noted at nursing facility. KUB completed which suggested distended loops of large and small bowel and radiopaque contrast in anorectal area. CT A/P completed upon arrival with very large amount of dense retained fecal debris noted in the rectum and throughout the colon. Retained contrast in the colon. Findings suggesting constipation vs fecal impaction. Gastrostomy tube in appropriate position. Manual disimpaction completed in ER followed by suppository and has had multiple bowel movements overnight (3) She received a bowel regimen with lactulose, senna and Miralax this morning with further bowel movement today. Hgb 11.8 on arrival, stable from discharge .        PMH/MEDS/ALLERGIES/SH/FH:     Past Medical History:    Anemia    Cognitive communication deficit    COVID-19 virus infection    Dementia (HCC)    Depression    Dysphagia    Esophageal reflux    Essential hypertension    High blood pressure    Hyperlipidemia    Obesity    Osteoarthritis    Parkinson disease (HCC)    Prediabetes    Prediabetes    Unspecified fracture of shaft of humerus, right arm, sequela      Past Surgical History:   Procedure Laterality Date    Gastrostomy tube placement      Knee replacement surgery Bilateral                No recently discontinued medications to reconcile   Medications Ordered Prior to Encounter[1]    Current Allergies: Allergies[2]    Social History     Occupational History    Not on file   Tobacco Use    Smoking status: Never    Smokeless tobacco: Never   Vaping Use    Vaping status: Never Used   Substance and Sexual Activity    Alcohol use: No    Drug use: No    Sexual activity: Not on file         Family History   Problem Relation Age of Onset    Bleeding Disorders Neg     Genetic Disease Neg               MEDICATIONS      [COMPLETED] magnesium sulfate in sterile water for injection 2 g/50mL IVPB premix 2 g  2 g Intravenous Once    dextrose 10% infusion (TPN no rate)   Intravenous Continuous PRN    pancrelipase (Lip-Prot-Amyl) (Zenpep) DR particles cap 10,000 Units  10,000 Units Per G Tube PRN    And    sodium bicarbonate tab 325 mg  325 mg Per G Tube PRN    metoprolol tartrate (Lopressor) tab 50 mg  50 mg Per G Tube BID    sodium chloride 0.9% infusion   Intravenous Continuous    [COMPLETED] sodium chloride 0.9 % IV bolus 500 mL  500 mL Intravenous Once    [COMPLETED] iopamidol 76% (ISOVUE-370) injection for power injector  80 mL Intravenous ONCE PRN    [COMPLETED] bisacodyl (Dulcolax) 10 MG rectal suppository 10 mg  10 mg Rectal Once    polyethylene glycol (PEG 3350) (Miralax) 17 g oral packet 17 g  17 g Per G Tube Daily    enoxaparin (Lovenox) 40 MG/0.4ML SUBQ injection 40 mg  40 mg Subcutaneous Daily    melatonin tab 3 mg  3 mg Oral Nightly PRN    acetaminophen (Tylenol Extra Strength) tab 1,000 mg  1,000 mg Oral Q8H PRN    benzonatate (Tessalon) cap 200 mg  200 mg Oral TID PRN    glycerin-hypromellose- (Artificial Tears) 0.2-0.2-1 % ophthalmic solution 1 drop  1 drop Both Eyes QID PRN    sodium chloride (Saline Mist) 0.65 % nasal solution 1 spray  1 spray Each Nare Q3H PRN    ondansetron (Zofran) 4 MG/2ML injection 4 mg  4 mg Intravenous Q6H PRN    metoclopramide (Reglan) 5 mg/mL injection 10 mg   10 mg Intravenous Q8H PRN    polyethylene glycol (PEG 3350) (Miralax) 17 g oral packet 17 g  17 g Oral Daily PRN    sennosides (Senokot) tab 17.2 mg  17.2 mg Oral Nightly PRN    bisacodyl (Dulcolax) 10 MG rectal suppository 10 mg  10 mg Rectal Daily PRN    fleet enema (Fleet) rectal enema 133 mL  1 enema Rectal Once PRN    sennosides (Senokot) tab 8.6 mg  8.6 mg Per G Tube BID    famotidine (Pepcid) tab 20 mg  20 mg Per G Tube BID    lactulose (ENULOSE) solution 20 g  30 mL Per G Tube Daily    cetirizine (ZyrTEC) tab 5 mg  5 mg Oral Daily    amoxicillin-pot clavulanate (Augmentin) 600-42.9 mg/5mL oral suspension 875 mg  875 mg Oral BID    [] sodium chloride 0.9% infusion   Intravenous Continuous              ROS:     A comprehensive 14 point review of systems was completed.  Pertinent positives and negatives noted in the the HPI.          Physical Exam     Vital signs:  /65 (BP Location: Left arm)   Pulse 89   Temp 97.9 °F (36.6 °C) (Oral)   Resp 20   Wt 127 lb 13.9 oz (58 kg)   SpO2 98%   BMI 22.65 kg/m²         Physical Exam        General: no acute distress, non-verbal, eyes closed  HEENT: Normal. No scleral icterus.   NECK: Supple. No neck vein distention.  CV: S1 and S2 normal.   LUNGS: Clear to auscultation.  ABDOMEN: Slightly firm and rounded, mild distention. Non-tender. No masses. Bowel sounds are present.  SKIN: Warm and dry.         IMAGING/LABS       Labs:   Lab Results   Component Value Date    WBC 7.5 2024    HGB 11.1 2024    HCT 35.6 2024    .0 2024    CREATSERUM 0.37 2024    BUN 13 2024     2024    K 3.7 2024     2024    CO2 24.0 2024    GLU 93 2024    CA 8.4 2024    MG 1.8 2024     Recent Labs   Lab 24  0602 24  1529 24  0550   * 103* 93   BUN 24* 19 13   CREATSERUM 0.64 0.54* 0.37*   CA 8.2* 8.5* 8.4*    144 142   K 3.6 4.0 3.7    110 113*   CO2  25.0 30.0 24.0     Recent Labs   Lab 12/17/24  0550   RBC 3.65*   HGB 11.1*   HCT 35.6   MCV 97.5   MCH 30.4   MCHC 31.2   RDW 15.4   NEPRELIM 4.57   WBC 7.5   .0       Recent Labs   Lab 12/12/24  1305 12/13/24  0615 12/16/24  1529   ALT 29 20 18   AST 30 26 29       Imaging:   CT ABDOMEN+PELVIS(CONTRAST ONLY)(CPT=74177)  Narrative: PROCEDURE:  CT ABDOMEN+PELVIS (CONTRAST ONLY) (CPT=74177)     COMPARISON:  [facility] , CT, CT ABDOMEN+PELVIS(CONTRAST ONLY)(CPT=74177), 11/18/2024, 4:55 PM.     INDICATIONS:  abd pn     TECHNIQUE:  CT scanning was performed from the dome of the diaphragm to the pubic symphysis with non-ionic intravenous contrast material. Post contrast coronal MPR imaging was performed.  Dose reduction techniques were used. Dose information is   transmitted to the ACR (American College of Radiology) NRDR (National Radiology Data Registry) which includes the Dose Index Registry.     PATIENT STATED HISTORY:(As transcribed by Technologist)  Abnormal abdominal x-ray/abdominal distention/pain. Patient unable to provide symptoms/history      CONTRAST USED:  80cc of Isovue 370     FINDINGS:    LIVER:  No enlargement, atrophy, abnormal density, or significant focal lesion.    BILIARY:  There has been prior cholecystectomy.  PANCREAS:  No lesion, fluid collection, ductal dilatation, or atrophy.    SPLEEN:  No enlargement or focal lesion.    KIDNEYS:  There are benign exophytic cysts in the right kidney.  Kidneys are otherwise unremarkable.  ADRENALS:  No mass or enlargement.    AORTA/VASCULAR:  Aorta is atherosclerotic.  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  This is most likely the result of May-Thurner syndrome.  RETROPERITONEUM:  No mass or adenopathy.    BOWEL/MESENTERY:  Very large amount of dense retained fecal debris is noted in the rectum and throughout the colon.  There is retained contrast in the colon.  Findings suggest constipation  or fecal impaction.  There are no dilated loops of small bowel   noted.  There is a gastrostomy tube present in appropriate position within the stomach.  ABDOMINAL WALL:  No mass or hernia.    URINARY BLADDER:  No visible focal wall thickening, lesion, or calculus.    PELVIC NODES:  No adenopathy.    PELVIC ORGANS:  No visible mass.  Pelvic organs appropriate for patient age.    BONES:  There is degenerative disc disease in the lumbar spine.  LUNG BASES:  No visible pulmonary or pleural disease.    OTHER:  Negative.                     Impression: CONCLUSION:    1. Very large amount of retained fecal debris is noted in colon consistent with fecal impaction and constipation.  There is also retained contrast.  2. There is a gastrostomy tube in appropriate position.  3. 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        LOCATION:          Dictated by (CST): Eric Pickering MD on 12/16/2024 at 6:09 PM       Finalized by (CST): Eric Pickering MD on 12/16/2024 at 6:13 PM               IMPRESSION:     Patient is an 89 year old who presented to ER due to abdominal distention and KUB revealing distended loops of large and small bowel completed at outside nursing facility. CT suggested fecal impaction w/ retained contrast in rectum. Multiple bowel movements after manual disimpaction, suppository, and Miralax/senna/lactulose this morning. Abdomen still with some distention and slightly firm.   Parkinson's disease  Dementia            PLAN:     Recommend KUB in the morning to assess stool burden  Continue Miralax, lactulose, senna regimen for now. May need to consider increasing vs rectal enemas  Continue to hold tube feeds based on KUB in the morning   IV fluids, antiemetics, and analgesics per primary team  When tube feeds are resumed, recommendations for nutrition per dietician          Maria Victoria Rinaldi, APRN  4:17  PM  12/17/2024  [facility]  [phone]          Physician Addendum  This patient was seen and examined independently, then discussed with TONIO Osorio.  The plan was discussed with TONIO and her note above was reviewed.  In summary, pt with fecal impaction, already treated and now having BMs by the time of my visit.  She doesn't participate in the interview.  Abdomen is somewhat distended but is soft.  She has no grimace with palpation.  Labs reviewed.  Plan is to continue bowel regimen, check XR abd tomorrow AM, and if findings are improved, then resume TFs via G tube.  OK to use G tube now for meds and flushes.      Byron Martinez MD        [1]   No current facility-administered medications on file prior to encounter.     Current Outpatient Medications on File Prior to Encounter   Medication Sig Dispense Refill    acetaminophen 325 MG Oral Tab 2 tablets (650 mg total) by Per G Tube route 2 (two) times daily.      amLODIPine 5 MG Oral Tab 1 tablet (5 mg total) by Per G Tube route daily.      amoxicillin clavulanate 875-125 MG Oral Tab 1 tablet by Per G Tube route 2 (two) times daily.      metoprolol tartrate 25 MG Oral Tab 2 tablets (50 mg total) by Per G Tube route 2 (two) times daily.      pancrelipase, Lip-Prot-Amyl, 28796-74607 units Oral Cap DR Particles 1 capsule (10,000 Units total) by Per G Tube route as needed (to unclog non-patent tubes). (Patient taking differently: 1 capsule (10,000 Units total) by Per G Tube route daily as needed (to unclog non-patent tubes).) 270 capsule 0    sodium bicarbonate 325 MG Oral Tab 1 tablet (325 mg total) by Per G Tube route as needed (to unclog non-patent tubes). (Patient taking differently: 1 tablet (325 mg total) by Per G Tube route daily as needed (to unclog non-patent tubes).) 30 tablet 0    lactulose 20 GM/30ML Oral Solution 30 mL (20 g total) by Per G Tube route daily.      hydrocortisone 1 % External Cream Apply 1 Application topically daily  as needed (dermatitis).      loratadine 10 MG Oral Tab 1 tablet (10 mg total) by Per G Tube route daily.      polyethylene glycol, PEG 3350, 17 g Oral Powd Pack 17 g by Per G Tube route daily.      sennosides 8.6 MG Oral Tab 2 tablets (17.2 mg total) by Per G Tube route daily as needed for constipation.      famoTIDine (PEPCID) 20 MG Oral Tab 1 tablet (20 mg total) by Per G Tube route 2 (two) times daily.  5    acetaminophen 325 MG Oral Tab 2 tablets (650 mg total) by Per G Tube route every 6 (six) hours as needed for Pain.     [2] No Known Allergies

## 2024-12-17 NOTE — ED QUICK NOTES
Orders for admission, patient is aware of plan and ready to go upstairs. Any questions, please call ED RN adriana at extension [phone].     Patient Covid vaccination status: Fully vaccinated     COVID Test Ordered in ED: None    COVID Suspicion at Admission: N/A    Running Infusions:    sodium chloride Stopped (12/16/24 1952)    None    Mental Status/LOC at time of transport: a/o x1 per norm    Other pertinent information:   CIWA score: N/A   NIH score:  N/A

## 2024-12-17 NOTE — H&P
[facility]     Sultana Hess Patient Status:  Inpatient    1935 MRN [id number]   Location [facility] 3NW-A Attending Kenny Ramos*   Hosp Day # 0 PCP Aubrey Tsai     Chief Complaint: Abdominal distension     Subjective:    History of Present Illness:     Sultana Hess is a 89 year old female with  h/anemia, Parkinsons Dementia, HTN, GERD, RE[facility] admission -  with PNA. Pt presents with abdominal distension. History limited.     History/Other:    Past Medical History:  Past Medical History:    Anemia    Cognitive communication deficit    COVID-19 virus infection    Dementia (HCC)    Depression    Dysphagia    Esophageal reflux    Essential hypertension    High blood pressure    Hyperlipidemia    Obesity    Osteoarthritis    Parkinson disease (HCC)    Prediabetes    Prediabetes    Unspecified fracture of shaft of humerus, right arm, sequela     Past Surgical History:   Past Surgical History:   Procedure Laterality Date    Gastrostomy tube placement      Knee replacement surgery Bilateral             Family History:   Family History   Problem Relation Age of Onset    Bleeding Disorders Neg     Genetic Disease Neg      Social History:    reports that she has never smoked. She has never used smokeless tobacco. She reports that she does not drink alcohol and does not use drugs.     Allergies: Allergies[1]    Medications:  Medications Ordered Prior to Encounter[2]    Review of Systems:   A comprehensive review of systems was  NOTcompleted.    Pertinent positives and negatives noted in the HPI.    Objective:   Physical Exam:    /62 (BP Location: Left arm)   Pulse 89   Temp 99.1 °F (37.3 °C) (Axillary)   Resp 18   Wt 127 lb 13.9 oz (58 kg)   SpO2 99%   BMI 22.65 kg/m²   General: No acute distress,  Respiratory: No rhonchi, no wheezes  Cardiovascular: S1, S2. Regular rate and rhythm  Abdomen: distended, +BS, FT in place  Neuro:  contracted non verbal not following command  Extremities: No edema      Results:    Labs:      Labs Last 24 Hours:    Recent Labs   Lab 12/12/24  1305 12/13/24  0615 12/14/24  0602 12/16/24  1529   RBC 4.71 4.00 3.89 3.93   HGB 14.3 11.9* 11.9* 11.8*   HCT 45.8 39.4 37.1 38.0   MCV 97.2 98.5 95.4 96.7   MCH 30.4 29.8 30.6 30.0   MCHC 31.2 30.2* 32.1 31.1   RDW 15.9 15.4 14.9 15.4   NEPRELIM 9.35* 7.24  --  6.90   WBC 12.3* 11.3* 8.3 10.3   .0 182.0 177.0 218.0       Recent Labs   Lab 12/12/24  1305 12/12/24 2036 12/13/24 0615 12/14/24  0602 12/16/24  1529   *   < > 135* 162* 103*   BUN 49*   < > 36* 24* 19   CREATSERUM 0.90   < > 0.64 0.64 0.54*   EGFRCR 62   < > 85 85 89   CA 9.0   < > 8.3* 8.2* 8.5*   ALB 3.6  --  3.0*  --  3.1*   *   < > 149* 143 144   K 4.6   < > 3.8 3.6 4.0   *   < > 114* 112 110   CO2 31.0   < > 25.0 25.0 30.0   ALKPHO 69  --  53*  --  61   AST 30  --  26  --  29   ALT 29  --  20  --  18   BILT 0.4  --  0.7  --  0.2   TP 7.9  --  6.8  --  6.5    < > = values in this interval not displayed.       Lab Results   Component Value Date    INR 1.18 11/21/2024    INR 1.23 (H) 09/16/2022    INR 1.11 04/10/2022       No results for input(s): \"TROP\", \"TROPHS\", \"CK\" in the last 168 hours.    No results for input(s): \"TROP\", \"PBNP\" in the last 168 hours.    No results for input(s): \"PCT\" in the last 168 hours.    Imaging: Imaging data reviewed in Epic.    Assessment & Plan:      #Abdominal distension  CT with w fecal impaction  Enema, OSMAN  Miralax, Senna   GI on CS  #PArkinsons  #REcent  hospitalization for aspiration PNA  Continue augmentin end date 12/17/24   #Hypertension   BP low in ER- hold meds for now  #Depression  # Chronic MayThurner       Plan of care discussed with Dr Harsha Quintero MD    Supplementary Documentation:     The 21st Century Cures Act makes medical notes like these available to patients in the interest of transparency. Please be advised this is a  medical document. Medical documents are intended to carry relevant information, facts as evident, and the clinical opinion of the practitioner. The medical note is intended as peer to peer communication and may appear blunt or direct. It is written in medical language and may contain abbreviations or verbiage that are unfamiliar.               **Certification      PHYSICIAN Certification of Need for Inpatient Hospitalization - Initial Certification    Patient will require inpatient services that will reasonably be expected to span two midnight's based on the clinical documentation in H+P.   Based on patients current state of illness, I anticipate that, after discharge, patient will require TBD.                      [1] No Known Allergies  [2]   Current Facility-Administered Medications on File Prior to Encounter   Medication Dose Route Frequency Provider Last Rate Last Admin    [COMPLETED] sodium chloride 0.9 % IV bolus 1,000 mL  1,000 mL Intravenous Once Monica Ramos MD   Stopped at 12/12/24 1400    [COMPLETED] sodium chloride 0.9 % IV bolus 1,890 mL  30 mL/kg Intravenous Once Monica Ramos MD   Stopped at 12/12/24 1554    [COMPLETED] piperacillin-tazobactam (Zosyn) 4.5 g in dextrose 5% 100 mL IVPB-ADDV  4.5 g Intravenous Once Monica Ramos MD   Stopped at 12/12/24 1553    [COMPLETED] magnesium sulfate in sterile water for injection 2 g/50mL IVPB premix 2 g  2 g Intravenous Once Parul Falk MD 50 mL/hr at 11/24/24 0826 2 g at 11/24/24 0826    [COMPLETED] magnesium sulfate in sterile water for injection 2 g/50mL IVPB premix 2 g  2 g Intravenous Once Josué Oswald MD 50 mL/hr at 11/22/24 0819 2 g at 11/22/24 0819    [COMPLETED] fentaNYL (Sublimaze) 50 mcg/mL injection             [COMPLETED] midazolam (Versed) 2 MG/2ML injection             [COMPLETED] lidocaine (Xylocaine) 1 % injection             [COMPLETED] iopamidol (ISOVUE-300) 61 % injection 30 mL  30 mL Injection ONCE PRN Shiv Hinojosa MD   10 mL  at 24 1619    [COMPLETED] magnesium oxide (Mag-Ox) tab 400 mg  400 mg Oral Once Josué Oswald MD   400 mg at 24 1812    [COMPLETED] Barium Sulfate (VARIBAR THIN) 40 % oral liquid SUSR 148 g  237 mL Oral Once Shiv Hinojosa MD   148 g at 24 0553    [COMPLETED] magnesium oxide (Mag-Ox) tab 400 mg  400 mg Oral Once Simon Reagan MD   400 mg at 24 0935    [COMPLETED] magnesium sulfate in sterile water for injection 2 g/50mL IVPB premix 2 g  2 g Intravenous Once Parul Falk MD 50 mL/hr at 24 0948 2 g at 24 0948    [COMPLETED] iopamidol 76% (ISOVUE-370) injection for power injector  80 mL Intravenous ONCE PRN Parul Falk MD   80 mL at 24 1711    [COMPLETED] magnesium sulfate in sterile water for injection 2 g/50mL IVPB premix 2 g  2 g Intravenous Once Ghazal Pride DO 50 mL/hr at 24 1713 2 g at 24 1713    [] dextrose 5%-sodium chloride 0.45% infusion   Intravenous Continuous Sue Barrera DO         Current Outpatient Medications on File Prior to Encounter   Medication Sig Dispense Refill    acetaminophen 325 MG Oral Tab 2 tablets (650 mg total) by Per G Tube route 2 (two) times daily.      amLODIPine 5 MG Oral Tab 1 tablet (5 mg total) by Per G Tube route daily.      amoxicillin clavulanate 875-125 MG Oral Tab 1 tablet by Per G Tube route 2 (two) times daily.      metoprolol tartrate 25 MG Oral Tab 2 tablets (50 mg total) by Per G Tube route 2 (two) times daily.      pancrelipase, Lip-Prot-Amyl, 80332-25137 units Oral Cap DR Particles 1 capsule (10,000 Units total) by Per G Tube route as needed (to unclog non-patent tubes). (Patient taking differently: 1 capsule (10,000 Units total) by Per G Tube route daily as needed (to unclog non-patent tubes).) 270 capsule 0    sodium bicarbonate 325 MG Oral Tab 1 tablet (325 mg total) by Per G Tube route as needed (to unclog non-patent tubes). (Patient taking differently: 1 tablet (325 mg total) by Per G Tube  route daily as needed (to unclog non-patent tubes).) 30 tablet 0    lactulose 20 GM/30ML Oral Solution 30 mL (20 g total) by Per G Tube route daily.      hydrocortisone 1 % External Cream Apply 1 Application topically daily as needed (dermatitis).      loratadine 10 MG Oral Tab 1 tablet (10 mg total) by Per G Tube route daily.      polyethylene glycol, PEG 3350, 17 g Oral Powd Pack 17 g by Per G Tube route daily.      sennosides 8.6 MG Oral Tab 2 tablets (17.2 mg total) by Per G Tube route daily as needed for constipation.      famoTIDine (PEPCID) 20 MG Oral Tab 1 tablet (20 mg total) by Per G Tube route 2 (two) times daily.  5    acetaminophen 325 MG Oral Tab 2 tablets (650 mg total) by Per G Tube route every 6 (six) hours as needed for Pain.

## 2024-12-17 NOTE — PLAN OF CARE
Patient alert and oriented x0, vital signs stable on room air. Nonverbal, unable to tell mentation. Rolls side to side. Multiple Bms throughout the night. Unable to assess pain or nausea, no verbal cues. Incontinent. IV fluids infusing. Safety measures in place, questions addressed, plan of care discussed with patient. Skin check performed with Ramy FRIEND, skin tear on sacrum pictured and put in chard, mepilex applied.     2015: pt arrived to floor in stable condtion.

## 2024-12-17 NOTE — PROGRESS NOTES
[facility]   part of [facility]     Hospitalist Progress Note     Sultana Hess Patient Status:  Inpatient    1935 MRN [id number]   Location [facility] 3NW-A Attending Tj Goldman,    Hosp Day # 1 PCP Aubrey Tsai     Chief Complaint: Abdominal distention    Subjective:     Patient had manual disimpaction followed by suppository in ER. She had multiple bowel movements overnight. Appears comfortable. History limited due to dementia.     Objective:    Review of Systems:   A comprehensive review of systems was unable to be completed due to patient clinical status.    Vital signs:  Temp:  [97.9 °F (36.6 °C)-99.1 °F (37.3 °C)] 97.9 °F (36.6 °C)  Pulse:  [86-93] 89  Resp:  [13-26] 20  BP: ()/(60-71) 118/65  SpO2:  [98 %-100 %] 98 %    Physical Exam:    General: No acute distress, A&O x 0 and non-verbal. Eyes closed  Respiratory: No wheezes, no rhonchi  Cardiovascular: S1, S2, regular rate and rhythm  Abdomen: Soft, rounded, positive bowel sounds, PEG in place  Extremities: No edema    Diagnostic Data:    Labs:  Recent Labs   Lab 24  1305 24  0615 24  0602 24  1529 24  0550   WBC 12.3* 11.3* 8.3 10.3 7.5   HGB 14.3 11.9* 11.9* 11.8* 11.1*   MCV 97.2 98.5 95.4 96.7 97.5   .0 182.0 177.0 218.0 191.0     Recent Labs   Lab 24  1305 24  2036 24  0615 24  0602 24  1529 24  0550   *   < > 135* 162* 103* 93   BUN 49*   < > 36* 24* 19 13   CREATSERUM 0.90   < > 0.64 0.64 0.54* 0.37*   CA 9.0   < > 8.3* 8.2* 8.5* 8.4*   ALB 3.6  --  3.0*  --  3.1*  --    *   < > 149* 143 144 142   K 4.6   < > 3.8 3.6 4.0 3.7   *   < > 114* 112 110 113*   CO2 31.0   < > 25.0 25.0 30.0 24.0   ALKPHO 69  --  53*  --  61  --    AST 30  --  26  --  29  --    ALT 29  --  20  --  18  --    BILT 0.4  --  0.7  --  0.2  --    TP 7.9  --  6.8  --  6.5  --     < > = values in this interval not displayed.     Estimated Creatinine  Clearance: 88.6 mL/min (A) (based on SCr of 0.37 mg/dL (L)).    No results for input(s): \"TROP\", \"TROPHS\", \"CK\" in the last 168 hours.    No results for input(s): \"PTP\", \"INR\" in the last 168 hours.     Microbiology  No results found for this visit on 12/16/24.    Imaging: Reviewed in Epic.    Medications:    polyethylene glycol (PEG 3350)  17 g Per G Tube Daily    enoxaparin  40 mg Subcutaneous Daily    sennosides  8.6 mg Per G Tube BID    famotidine  20 mg Per G Tube BID    lactulose  30 mL Per G Tube Daily    cetirizine  5 mg Oral Daily    amoxicillin-pot clavulanate  875 mg Oral BID       Assessment & Plan:      #Abdominal distension with fecal impaction  -CT a/p reviewed  -S/p manual disimpaction in ER and suppository with multiple BM  -Bowel regimen with Miralax, senna and lactulose  -Had loose stool in diaper due to overflow diarrhea, C diff and GI PCR negative  -GI on consult  -Stop IVF once tube feeds restarted    #Recent hospitalization for aspiration pneumonia  -Complete Augmentin tonight    #Hypertension  -Resume metoprolol with holding parameters, hold amlodipine    #Failure to thrive/Severe Malnutrition s/p PEG  #Advanced dementia and Parkinson's Disease   -Sinemet was recently stopped    #GERD - pepcid  #Depression  #Chronic MayThurner       Tj Goldman, DO    Supplementary Documentation:     Quality:  DVT Mechanical Prophylaxis:   SCDs,    DVT Pharmacologic Prophylaxis   Medication    enoxaparin (Lovenox) 40 MG/0.4ML SUBQ injection 40 mg     Code Status: DNAR/Selective Treatment  Landaverde: No urinary catheter in place  BRAIN: 1 day    Discharge is dependent on: Clinical state, GI recs  At this point Ms. Hess is expected to be discharge to: [facility]    The 21st Century Cures Act makes medical notes like these available to patients in the interest of transparency. Please be advised this is a medical document. Medical documents are intended to carry relevant information, facts as evident, and the clinical  opinion of the practitioner. The medical note is intended as peer to peer communication and may appear blunt or direct. It is written in medical language and may contain abbreviations or verbiage that are unfamiliar.     Dietitian Malnutrition Assessment    Evaluation for Malnutrition: Criteria for severe malnutrition diagnosis- chronic illness related to   Wt loss greater than 5% in 1 month., Body fat severe depletion.               RD Malnutrition Care Plan: See RD nutrition assessment for additional recommendations.    Body Fat/Muscle Mass:          Physician Assessment     Patient has a diagnosis of severe malnutrition

## 2024-12-17 NOTE — DIETARY NOTE
[facility]   part of [facility]    NUTRITION ASSESSMENT    Pt meets severe malnutrition criteria at this time.    CRITERIA FOR MALNUTRITION DIAGNOSIS:  Criteria for severe malnutrition diagnosis: chronic illness related to wt loss greater than 5% in 1 month and body fat severe depletion      NUTRITION INTERVENTION:    RD nutrition Care Plan- See RD nutrition assessment for additional recommendations  Enteral Nutrition - When able to resume  -Recommend starting Jevity 1.5 at 25 mL/hr advancing 10 mL/hr q 4 hours to goal rate of 45 mL/hr.   This will provide 1620 kcal, 69 grams protein, 820 mL total free water, and 100% of RDI's.   Recommend 150 mL water flush q 4 hours, TF+FWF provides 1720 mL total fluids.         PATIENT STATUS: 12/13/24 consult for TF  89 female admitted from nursing home with abdominal distention. CT with fecal impaction. Pt is g-tube dependent.  Pt with h/o dementia, Parkinson, non verbal.  Pt has lost wt ~ 11lbs (7.8%) x  one month.  Pt with severe fat depletion at last admission.   On 2L via NC    ANTHROPOMETRICS:  Ht:  160 cm (5' 3\")  Wt: 58 kg (127 lb 13.9 oz).   BMI: Body mass index is 22.65 kg/m².  IBW: 52.3 kg      WEIGHT HISTORY:   Weight loss: Yes, Severe Wt loss of 11 lbs, 7.8%, over 1 months     Wt Readings from Last 10 Encounters:   12/16/24 58 kg (127 lb 13.9 oz)   12/12/24 58.6 kg (129 lb 3.2 oz)   11/16/24 63 kg (138 lb 14.2 oz)   04/15/23 63.5 kg (139 lb 15.9 oz)   10/03/22 63.5 kg (140 lb)   09/16/22 65.7 kg (144 lb 13.5 oz)   04/10/22 70.3 kg (154 lb 15.7 oz)   01/14/22 70.3 kg (154 lb 15.7 oz)   12/01/21 70.3 kg (155 lb)   11/19/21 68 kg (150 lb)        NUTRITION:  Diet:       Procedures    Clear liquid diet Is Patient on Accuchecks? No      Food Allergies: No  Cultural/Ethnic/Holiness Preferences Addressed: Yes    Percent Meals Eaten (last 3 days)       None            GI system review: swallowing dysfunction Gtube dependent Last BM Date: 12/16/24  Skin and wounds:  coccyx wound    NUTRITION RELATED PHYSICAL FINDINGS:     1. Body Fat/Muscle Mass: severe depletion body fat Buccal fat pad     2. Fluid Accumulation: none    NUTRITION PRESCRIPTION:  58 kg Actual Body Weight  Calories: 6954-3273 calories/day (25-30 kcal/kg)  Protein: 70-88 grams protein/day (1.2-1.5 grams protein per kg)  Fluid: ~1 ml/kcal or per MD discretion    NUTRITION DIAGNOSIS/PROBLEM:  Malnutrition related to inability to take or tolerate as evidenced by documented/reported unintentional weight loss and loss of muscle mass      MONITOR AND EVALUATE/NUTRITION GOALS:  Weight stable within 1 to 2 lbs during admission - New  Tolerate alternative nutrition at 100% of goal - New      MEDICATIONS:  Pepcid, lactulose, miralax, senokot    LABS:  Reviewed    Pt is at High nutrition risk    Karen Flores RD, LDN  Clinical Nutrition  [id number]

## 2024-12-17 NOTE — CDS QUERY
Dear Dr. Pride:     Please select diagnosis that corresponds to creatinine of 1.06  (  ) Acute Kidney Injury   (  ) Other (please specify):         ___________________________________________________________________________                                                                    12/12 12/12 12/14 2149 2036                        CREATININE (mg/dL) 0.64 0.71 1.06H     (0.64 x 1.5 = 0.96)    __h&p: sepsis with acute hypox resp failure d/t presumed asp pna; hypernatremia; dysphagia s/p peg- dietary consult; advanced dementia - nonverbal; parkinsons disease - contracted rue; depression htn; dyslipidemia      Treatment: iv fluids    *The current consensus-based authoritative criteria for acute kidney injury (SHANTA) comes from the National Kidney Foundation KDIGO conference definition.  KDIGO defines SHANTA (applicable to both adult or pediatric patients) as any of the following:                   Increase in creatinine level to >= 1.5x baseline (historical or measured), which is known or presumed to have occurred within the prior 7 days; or                Increase (not a decrease) in creatinine of >= 0.3 mg/dL comparing two separate levels, the second done within 48 hours or less of the first; or                Urine output < 0.5 ml/kg/hr for 6 hours     The KDIGO criteria apply to patients with and without CKD.     When baseline creatinine is unknown, KDIGO advises: “The lowest SCr [Creatinine level] obtained during a hospitalization is usually equal to or greater than the baseline. This SCr should be used to diagnose (and stage) SHANTA.” Documentation from the Medical Record:     *Reference KDIGO       Use of terms such as suspected, possible, or probable (associated with a specific diagnosis that is being evaluated, monitored, or treated as if it exists) are acceptable and can be coded in the inpatient setting, when documented at the time of  discharge.  Please add any additional documentation to your progress note and continue to document this through discharge.      If you have any questions, please contact Clinical : Sabrina Arreola RN, CDS  at #[phone]. Thank You!  THIS FORM IS A PERMANENT PART OF THE MEDICAL RECORD

## 2024-12-17 NOTE — PROGRESS NOTES
Non verbal ,doesn't follow commands,Contracted.Unable to assess pain  but no nonverbal signs of pain.Left inner buttocks  wound noted ,pictures taken and new Mepilex dressing applied.PEG tube clamped.SCd's and hell protectors (boots ) in place.Repositioned to sides q 2 hrs.Oral care done.Plan of care discussed with daughter Ariel earlier.Waiting for GI consult -- aware.

## 2024-12-17 NOTE — CM/SW NOTE
Pt is an 89 yo female admitted for fecal impaction.  Pt came from [facility].  Pt has a history of dementia.  She is bed bound and is non-verbal.  Pt also has a feeding tube.  Spoke with pt's son Jimenez who confirmed that pt will return to [facility] upon dc.  Updates sent to [facility].  DANIEL following.     12/17/24 1000   CM/SW Referral Data   Referral Source Social Work (self-referral)   Reason for Referral Discharge planning   Informant Son   Readmission Assessment   Pt's living situation prior to admission? Long term care facility   Was full assessment completed by SW/CM on prior admission? Yes   Was the recommended discharge plan achieved? Yes   Was pt. discharged w/out services? No   Patient Info   Patient's Current Mental Status at Time of Assessment Confused or unable to complete assessment   Patient's Home Environment Long Term Care Facility   Post Acute Care Provider Upon Admission   ([facility])   Discharge Needs   Anticipated D/C needs Long term care facility

## 2024-12-18 ENCOUNTER — APPOINTMENT (OUTPATIENT)
Dept: GENERAL RADIOLOGY | Facility: HOSPITAL | Age: 89
End: 2024-12-18
Attending: FAMILY MEDICINE
Payer: MEDICARE

## 2024-12-18 VITALS
SYSTOLIC BLOOD PRESSURE: 126 MMHG | BODY MASS INDEX: 23 KG/M2 | RESPIRATION RATE: 16 BRPM | HEART RATE: 86 BPM | TEMPERATURE: 99 F | WEIGHT: 127.88 LBS | OXYGEN SATURATION: 97 % | DIASTOLIC BLOOD PRESSURE: 52 MMHG

## 2024-12-18 LAB
GLUCOSE BLD-MCNC: 79 MG/DL (ref 70–99)
MAGNESIUM SERPL-MCNC: 2 MG/DL (ref 1.6–2.6)

## 2024-12-18 PROCEDURE — 74018 RADEX ABDOMEN 1 VIEW: CPT | Performed by: FAMILY MEDICINE

## 2024-12-18 PROCEDURE — 99239 HOSP IP/OBS DSCHRG MGMT >30: CPT | Performed by: INTERNAL MEDICINE

## 2024-12-18 NOTE — PLAN OF CARE
LISBET pts orientation d/t being nonverbal, vital signs stable on room air. IV fluids infusing. Having small bms. Unable to move. Unable to assess for pain or nausea but no emesis or nonverbal pain cues. Mepilex on sacrum. G-tube in place, currently clamped. Safety measures in place.

## 2024-12-18 NOTE — DISCHARGE SUMMARY
[facility] HOSPITALIST  DISCHARGE SUMMARY     Sultana Hess Patient Status:  Inpatient    1935 MRN [id number]   Location [facility] 3NW-A Attending Grace Contreras, DO   Hosp Day # 2 PCP Aubrey Tsai     Date of Admission: 2024  Date of Discharge:   24  Discharge Disposition: SNF Long Term Care ([facility])    Discharge Diagnosis:  #Abdominal distension with fecal impaction  #Recent hospitalization for aspiration pneumonia  #Hypertension  #Failure to thrive/Severe Malnutrition s/p PEG  #Advanced dementia and Parkinson's Disease   #GERD   #Depression  #Chronic MayThurner     History of Present Illness: (per Dr. Quintero), Sultana Hess is a 89 year old female with  h/anemia, Parkinsons Dementia, HTN, GERD, [facility] admission -  with PNA. Pt presents with abdominal distension. History limited.     Brief Synopsis: admitted with fecal impaction. GI consulted. Pt was manually disimpacted in the ER and then started on bowel regimen. Her clinical condition improved, she had multiple bowel movements and was cleared for discharge to her facility with recommendation to f/u with PCP in outpt setting.     Lace+ Score: 73  59-90 High Risk  29-58 Medium Risk  0-28   Low Risk       TCM Follow-Up Recommendation:  LACE > 58: High Risk of readmission after discharge from the hospital.      Procedures during hospitalization:   none    Incidental or significant findings and recommendations (brief descriptions):  As above    Lab/Test results pending at Discharge:   none    Consultants:  GI    Discharge Medication List:     Discharge Medications        CONTINUE taking these medications        Instructions Prescription details   acetaminophen 325 MG Tabs  Commonly known as: Tylenol      2 tablets (650 mg total) by Per G Tube route every 6 (six) hours as needed for Pain.   Refills: 0     acetaminophen 325 MG Tabs  Commonly known as: Tylenol      2 tablets (650 mg total) by Per G Tube route 2 (two) times  daily.   Refills: 0     amLODIPine 5 MG Tabs  Commonly known as: Norvasc      1 tablet (5 mg total) by Per G Tube route daily.   Refills: 0     famotidine 20 MG Tabs  Commonly known as: Pepcid      1 tablet (20 mg total) by Per G Tube route 2 (two) times daily.   Refills: 5     hydrocortisone 1 % Crea      Apply 1 Application topically daily as needed (dermatitis).   Refills: 0     lactulose 20 GM/30ML Soln  Commonly known as: ENULOSE      30 mL (20 g total) by Per G Tube route daily.   Refills: 0     loratadine 10 MG Tabs  Commonly known as: Claritin      1 tablet (10 mg total) by Per G Tube route daily.   Refills: 0     metoprolol tartrate 25 MG Tabs  Commonly known as: Lopressor      2 tablets (50 mg total) by Per G Tube route 2 (two) times daily.   Refills: 0     pancrelipase (Lip-Prot-Amyl) 08026-83692 units Cpep  Commonly known as: Zenpep      1 capsule (10,000 Units total) by Per G Tube route as needed (to unclog non-patent tubes).   Quantity: 270 capsule  Refills: 0     polyethylene glycol (PEG 3350) 17 g Pack  Commonly known as: Miralax      17 g by Per G Tube route daily.   Refills: 0     sennosides 8.6 MG Tabs  Commonly known as: Senokot      2 tablets (17.2 mg total) by Per G Tube route daily as needed for constipation.   Refills: 0     sodium bicarbonate 325 MG Tabs      1 tablet (325 mg total) by Per G Tube route as needed (to unclog non-patent tubes).   Stop taking on: December 24, 2024  Quantity: 30 tablet  Refills: 0            STOP taking these medications      amoxicillin clavulanate 875-125 MG Tabs  Commonly known as: Augmentin                 [place]    Follow-up appointment:   No follow-up provider specified.  Appointments for Next 30 Days 12/18/2024 - 1/17/2025      None            Vital signs:  Temp:  [97.4 °F (36.3 °C)-98.5 °F (36.9 °C)] 98.5 °F (36.9 °C)  Pulse:  [77-84] 77  Resp:  [16-20] 16  BP: (109-126)/(31-56) 126/52  SpO2:  [95 %-98 %] 98 %    Physical Exam:    General: No  acute distress   Lungs: clear to auscultation  Cardiovascular: S1, S2  Abdomen: Soft    -----------------------------------------------------------------------------------------------  PATIENT DISCHARGE INSTRUCTIONS: See electronic chart    Grace Contreras,     Total time spent on discharge plannin minutes     The  [place] Cures Act makes medical notes like these available to patients in the interest of transparency. Please be advised this is a medical document. Medical documents are intended to carry relevant information, facts as evident, and the clinical opinion of the practitioner. The medical note is intended as peer to peer communication and may appear blunt or direct. It is written in medical language and may contain abbreviations or verbiage that are unfamiliar.

## 2024-12-18 NOTE — CM/SW NOTE
Pt is ready for dc today.  Pt will return to [facility].  RN to call report to [phone].  [facility]  PCS from [facility].  RN to notify pt's family of dc time.

## 2024-12-18 NOTE — PROGRESS NOTES
A&Ox0. VSS. RA. .  GI: Abdomen soft, nondistended. Tube feedings restarted per order. Big BM this AM    : Voids, incontinent  Pain controlled with PRN pain medications  Total assist- Q2 turns   Diet: Tube feeds per order   IVF running per order.  All appropriate safety measures in place. All questions and concerns addressed.

## 2025-02-20 ENCOUNTER — HOSPITAL ENCOUNTER (EMERGENCY)
Facility: HOSPITAL | Age: 89
Discharge: HOME | End: 2025-02-20
Attending: STUDENT IN AN ORGANIZED HEALTH CARE EDUCATION/TRAINING PROGRAM
Payer: MEDICARE

## 2025-02-20 ENCOUNTER — APPOINTMENT (OUTPATIENT)
Dept: GENERAL RADIOLOGY | Facility: HOSPITAL | Age: 89
End: 2025-02-20
Attending: STUDENT IN AN ORGANIZED HEALTH CARE EDUCATION/TRAINING PROGRAM
Payer: MEDICARE

## 2025-02-20 VITALS
RESPIRATION RATE: 26 BRPM | SYSTOLIC BLOOD PRESSURE: 127 MMHG | OXYGEN SATURATION: 100 % | DIASTOLIC BLOOD PRESSURE: 66 MMHG | HEART RATE: 96 BPM | TEMPERATURE: 99 F | BODY MASS INDEX: 23.53 KG/M2 | WEIGHT: 127.88 LBS | HEIGHT: 62 IN

## 2025-02-20 DIAGNOSIS — T85.528A DISLODGED GASTROSTOMY TUBE: Primary | ICD-10-CM

## 2025-02-20 PROCEDURE — 99283 EMERGENCY DEPT VISIT LOW MDM: CPT

## 2025-02-20 PROCEDURE — 43762 RPLC GTUBE NO REVJ TRC: CPT

## 2025-02-20 PROCEDURE — 74018 RADEX ABDOMEN 1 VIEW: CPT | Performed by: STUDENT IN AN ORGANIZED HEALTH CARE EDUCATION/TRAINING PROGRAM

## 2025-02-20 RX ORDER — MULTIVIT-MIN/IRON FUM/FOLIC AC 7.5 MG-4
1 TABLET ORAL DAILY
COMMUNITY

## 2025-02-20 RX ORDER — ASCORBIC ACID 500 MG
500 TABLET ORAL DAILY
COMMUNITY

## 2025-02-20 NOTE — ED INITIAL ASSESSMENT (HPI)
Pt presents to the ED via EMS from [facility] with c/o leaking/clogged g-tube. Ems state facility report pt received tube feeding at 4am and at 5am had difficulty flushing, was leaking and medics report tube \"split\". Per ems, pt has GCS of 6 (norm per ems) and is a \"selective DNR\", on oxygen 3l/nc at all times and english is not primary language. Pt lying on cart with eyes open, not following commands, skin w/d,resps reg/shallow, extremities contracted x 4 and pt not following commands, g-tube noted to be tied in a knot and split at base near point of attachment.

## 2025-02-20 NOTE — ED QUICK NOTES
Upon further assessment, pt does appear to look to her right but still will not track.     Pt noted to have green discharge from both eyes, areas cleansed with cloth.    Attempt made to contact [facility] via phone to see if there is any additional  concerns but no answer.

## 2025-02-20 NOTE — ED QUICK NOTES
Pt status unchanged. Pt ready for transport back to facility. Pt with no belongings noted from Liz.

## 2025-02-20 NOTE — ED QUICK NOTES
[facility] here for transport back to faciltity. Spoke to Savanah and aware of tube replacement and ok for use.

## 2025-02-20 NOTE — ED QUICK NOTES
[facility], they sent up  Michael tube instead of adrian 16 f tube.

## 2025-02-20 NOTE — ED QUICK NOTES
Spoke with pt's son Jimenez via phone and made aware of pt status and going back to [facility].     [facility] contacted at this time for BLS transport back to facility.

## 2025-02-23 NOTE — ED PROVIDER NOTES
Patient Seen in: [facility] Emergency Department      History     Chief Complaint   Patient presents with    Cath Tube Problem     Stated Complaint: leaking/clogged g-tube    Subjective:   HPI      Patient is a 89-year-old history of dementia brought in by EMS from nursing home for malfunctioning G-tube.  Per EMS patient is at her baseline.  Patient is unable to provide any history and does not appear to be in any acute distress.    Objective:     Past Medical History:    Anemia    Cognitive communication deficit    COVID-19 virus infection    Dementia (HCC)    Depression    Dysphagia    Esophageal reflux    Essential hypertension    High blood pressure    Hyperlipidemia    Obesity    Osteoarthritis    Parkinson disease (HCC)    Prediabetes    Prediabetes    Unspecified fracture of shaft of humerus, right arm, sequela              Past Surgical History:   Procedure Laterality Date    Gastrostomy tube placement      Knee replacement surgery Bilateral                       Social History     Socioeconomic History    Marital status:    Tobacco Use    Smoking status: Never    Smokeless tobacco: Never   Vaping Use    Vaping status: Never Used   Substance and Sexual Activity    Alcohol use: No    Drug use: No   Other Topics Concern    Caffeine Concern Yes     Comment: 1 cup of tea daily    Exercise No     Social Drivers of Health     Food Insecurity: Unknown (2024)    Food Insecurity     Food Insecurity: Patient unable to answer   Transportation Needs: Unknown (2024)    Transportation Needs     Lack of Transportation: Patient unable to answer   Housing Stability: Unknown (2024)    Housing Stability     Housing Instability: Patient unable to answer                  Physical Exam     ED Triage Vitals [25 0810]   /71   Pulse 110   Resp (!) 28   Temp 98.6 °F (37 °C)   Temp src Temporal   SpO2 100 %   O2 Device Nasal cannula       Current Vitals:   No data recorded    Physical  Exam  Vitals and nursing note reviewed.   Constitutional:       General: She is not in acute distress.  HENT:      Head: Normocephalic.      Nose: Nose normal.      Mouth/Throat:      Mouth: Mucous membranes are moist.   Eyes:      Extraocular Movements: Extraocular movements intact.      Pupils: Pupils are equal, round, and reactive to light.   Cardiovascular:      Rate and Rhythm: Normal rate and regular rhythm.      Pulses: Normal pulses.   Pulmonary:      Effort: Pulmonary effort is normal.   Abdominal:      General: Abdomen is flat. Bowel sounds are normal. There is no distension.      Palpations: Abdomen is soft.      Tenderness: There is no abdominal tenderness. There is no right CVA tenderness, left CVA tenderness, guarding or rebound.      Hernia: No hernia is present.      Comments: 16 Khmer G-tube in place however external portion has a laceration in the tubing   Musculoskeletal:      Cervical back: Normal range of motion.      Comments: Contracted extremities contracted extremities   Skin:     General: Skin is warm and dry.   Neurological:      Mental Status: She is alert. Mental status is at baseline.   Psychiatric:         Mood and Affect: Mood normal.             ED Course   Labs Reviewed - No data to display         XR ABDOMEN (1 VIEW) (CPT=74018)    Result Date: 2/20/2025  CONCLUSION:  See above.   LOCATION:  [facility]   Dictated by (CST): Maged Stein MD on 2/20/2025 at 10:01 AM     Finalized by (CST): Maged Stein MD on 2/20/2025 at 10:03 AM            [facility]              Medical Decision Making  Patient presents with a dislodged G-tube    Unable to locate a 16 Khmer therefore 18 Khmer G-tube was reinserted.  I reviewed the KUB images postcontrast with appropriate positioning of G-tube.  Patient therefore discharged back to her facility.    Disposition and Plan     Clinical Impression:  1. Dislodged gastrostomy tube         Disposition:  Discharge  2/20/2025 10:05 am    Follow-up:  Mihai Davila  MD  [address] [place] [id number]  [phone]    Follow up            Medications Prescribed:  Discharge Medication List as of 2/20/2025 10:46 AM              Supplementary Documentation:

## 2025-07-29 ENCOUNTER — APPOINTMENT (OUTPATIENT)
Dept: CT IMAGING | Facility: HOSPITAL | Age: 89
End: 2025-07-29
Attending: EMERGENCY MEDICINE

## 2025-07-29 ENCOUNTER — APPOINTMENT (OUTPATIENT)
Dept: GENERAL RADIOLOGY | Facility: HOSPITAL | Age: 89
End: 2025-07-29
Attending: EMERGENCY MEDICINE

## 2025-07-29 ENCOUNTER — HOSPITAL ENCOUNTER (INPATIENT)
Facility: HOSPITAL | Age: 89
LOS: 2 days | Discharge: NURSING, REHAB OR LONG-TERM CARE | End: 2025-07-31
Attending: EMERGENCY MEDICINE | Admitting: HOSPITALIST

## 2025-07-29 DIAGNOSIS — K94.23 GASTROSTOMY MALFUNCTION (HCC): ICD-10-CM

## 2025-07-29 DIAGNOSIS — N39.0 URINARY TRACT INFECTION WITHOUT HEMATURIA, SITE UNSPECIFIED: Primary | ICD-10-CM

## 2025-07-29 LAB
ALBUMIN SERPL-MCNC: 3.8 G/DL (ref 3.2–4.8)
ALBUMIN/GLOB SERPL: 0.9 (ref 1–2)
ALP LIVER SERPL-CCNC: 73 U/L (ref 55–142)
ALT SERPL-CCNC: 15 U/L (ref 10–49)
ANION GAP SERPL CALC-SCNC: 3 MMOL/L (ref 0–18)
APTT PPP: 28.5 SECONDS (ref 23–36)
AST SERPL-CCNC: 26 U/L (ref ?–34)
BASOPHILS # BLD AUTO: 0.01 X10(3) UL (ref 0–0.2)
BASOPHILS NFR BLD AUTO: 0.2 %
BILIRUB SERPL-MCNC: 0.2 MG/DL (ref 0.2–1.1)
BILIRUB UR QL STRIP.AUTO: NEGATIVE
BUN BLD-MCNC: 22 MG/DL (ref 9–23)
CALCIUM BLD-MCNC: 8.2 MG/DL (ref 8.7–10.6)
CHLORIDE SERPL-SCNC: 103 MMOL/L (ref 98–112)
CLARITY UR REFRACT.AUTO: CLEAR
CO2 SERPL-SCNC: 33 MMOL/L (ref 21–32)
COLOR UR AUTO: YELLOW
CREAT BLD-MCNC: 0.7 MG/DL (ref 0.55–1.02)
EGFRCR SERPLBLD CKD-EPI 2021: 83 ML/MIN/1.73M2 (ref 60–?)
EOSINOPHIL # BLD AUTO: 0.33 X10(3) UL (ref 0–0.7)
EOSINOPHIL NFR BLD AUTO: 5.2 %
ERYTHROCYTE [DISTWIDTH] IN BLOOD BY AUTOMATED COUNT: 13.3 %
FLUAV + FLUBV RNA SPEC NAA+PROBE: NEGATIVE
FLUAV + FLUBV RNA SPEC NAA+PROBE: NEGATIVE
GLOBULIN PLAS-MCNC: 4.1 G/DL (ref 2–3.5)
GLUCOSE BLD-MCNC: 113 MG/DL (ref 70–99)
GLUCOSE UR STRIP.AUTO-MCNC: NORMAL MG/DL
HCT VFR BLD AUTO: 35.9 % (ref 35–48)
HGB BLD-MCNC: 11.7 G/DL (ref 12–16)
IMM GRANULOCYTES # BLD AUTO: 0.03 X10(3) UL (ref 0–1)
IMM GRANULOCYTES NFR BLD: 0.5 %
INR BLD: 1.1 (ref 0.8–1.2)
KETONES UR STRIP.AUTO-MCNC: NEGATIVE MG/DL
LACTATE SERPL-SCNC: 0.6 MMOL/L (ref 0.5–2)
LEUKOCYTE ESTERASE UR QL STRIP.AUTO: NEGATIVE
LYMPHOCYTES # BLD AUTO: 1.77 X10(3) UL (ref 1–4)
LYMPHOCYTES NFR BLD AUTO: 27.7 %
MCH RBC QN AUTO: 30.5 PG (ref 26–34)
MCHC RBC AUTO-ENTMCNC: 32.6 G/DL (ref 31–37)
MCV RBC AUTO: 93.7 FL (ref 80–100)
MONOCYTES # BLD AUTO: 0.48 X10(3) UL (ref 0.1–1)
MONOCYTES NFR BLD AUTO: 7.5 %
NEUTROPHILS # BLD AUTO: 3.77 X10 (3) UL (ref 1.5–7.7)
NEUTROPHILS # BLD AUTO: 3.77 X10(3) UL (ref 1.5–7.7)
NEUTROPHILS NFR BLD AUTO: 58.9 %
NT-PROBNP SERPL-MCNC: 185 PG/ML (ref ?–450)
OSMOLALITY SERPL CALC.SUM OF ELEC: 292 MOSM/KG (ref 275–295)
PH UR STRIP.AUTO: 8 (ref 5–8)
PLATELET # BLD AUTO: 195 10(3)UL (ref 150–450)
POTASSIUM SERPL-SCNC: 4.2 MMOL/L (ref 3.5–5.1)
PROT SERPL-MCNC: 7.9 G/DL (ref 5.7–8.2)
PROTHROMBIN TIME: 14.3 SECONDS (ref 11.6–14.8)
RBC # BLD AUTO: 3.83 X10(6)UL (ref 3.8–5.3)
RBC #/AREA URNS AUTO: >10 /HPF
RSV RNA SPEC NAA+PROBE: NEGATIVE
SARS-COV-2 RNA RESP QL NAA+PROBE: NOT DETECTED
SODIUM SERPL-SCNC: 139 MMOL/L (ref 136–145)
SP GR UR STRIP.AUTO: >1.03 (ref 1–1.03)
UROBILINOGEN UR STRIP.AUTO-MCNC: NORMAL MG/DL
WBC # BLD AUTO: 6.4 X10(3) UL (ref 4–11)

## 2025-07-29 PROCEDURE — 71260 CT THORAX DX C+: CPT | Performed by: EMERGENCY MEDICINE

## 2025-07-29 PROCEDURE — 74018 RADEX ABDOMEN 1 VIEW: CPT | Performed by: EMERGENCY MEDICINE

## 2025-07-29 PROCEDURE — 74177 CT ABD & PELVIS W/CONTRAST: CPT | Performed by: EMERGENCY MEDICINE

## 2025-07-29 PROCEDURE — 71045 X-RAY EXAM CHEST 1 VIEW: CPT | Performed by: EMERGENCY MEDICINE

## 2025-07-29 PROCEDURE — 99223 1ST HOSP IP/OBS HIGH 75: CPT | Performed by: HOSPITALIST

## 2025-07-29 RX ORDER — SENNOSIDES 8.6 MG/1
2 TABLET ORAL DAILY PRN
Status: DISCONTINUED | OUTPATIENT
Start: 2025-07-29 | End: 2025-07-29

## 2025-07-29 RX ORDER — FAMOTIDINE 20 MG/1
20 TABLET, FILM COATED ORAL DAILY
Status: DISCONTINUED | OUTPATIENT
Start: 2025-07-30 | End: 2025-07-31

## 2025-07-29 RX ORDER — METOCLOPRAMIDE HYDROCHLORIDE 5 MG/ML
5 INJECTION INTRAMUSCULAR; INTRAVENOUS EVERY 8 HOURS PRN
Status: DISCONTINUED | OUTPATIENT
Start: 2025-07-29 | End: 2025-07-31

## 2025-07-29 RX ORDER — MUPIROCIN 2 %
1 OINTMENT (GRAM) TOPICAL 2 TIMES DAILY
COMMUNITY

## 2025-07-29 RX ORDER — BISACODYL 10 MG
10 SUPPOSITORY, RECTAL RECTAL
Status: DISCONTINUED | OUTPATIENT
Start: 2025-07-29 | End: 2025-07-31

## 2025-07-29 RX ORDER — POLYETHYLENE GLYCOL 3350 17 G/17G
17 POWDER, FOR SOLUTION ORAL DAILY
Status: DISCONTINUED | OUTPATIENT
Start: 2025-07-29 | End: 2025-07-29

## 2025-07-29 RX ORDER — SODIUM CHLORIDE, SODIUM LACTATE, POTASSIUM CHLORIDE, CALCIUM CHLORIDE 600; 310; 30; 20 MG/100ML; MG/100ML; MG/100ML; MG/100ML
INJECTION, SOLUTION INTRAVENOUS CONTINUOUS
Status: ACTIVE | OUTPATIENT
Start: 2025-07-29 | End: 2025-07-30

## 2025-07-29 RX ORDER — ACETAMINOPHEN 500 MG
1000 TABLET ORAL EVERY 4 HOURS PRN
Status: DISCONTINUED | OUTPATIENT
Start: 2025-07-29 | End: 2025-07-31

## 2025-07-29 RX ORDER — ACETAMINOPHEN 650 MG/1
650 SUPPOSITORY RECTAL ONCE
Status: COMPLETED | OUTPATIENT
Start: 2025-07-29 | End: 2025-07-29

## 2025-07-29 RX ORDER — LACTULOSE 10 G/15ML
30 SOLUTION ORAL DAILY
Status: DISCONTINUED | OUTPATIENT
Start: 2025-07-30 | End: 2025-07-31

## 2025-07-29 RX ORDER — POLYETHYLENE GLYCOL 3350 17 G/17G
17 POWDER, FOR SOLUTION ORAL DAILY PRN
Status: DISCONTINUED | OUTPATIENT
Start: 2025-07-29 | End: 2025-07-31

## 2025-07-29 RX ORDER — TRIAMCINOLONE ACETONIDE 1 MG/G
1 CREAM TOPICAL 2 TIMES DAILY
COMMUNITY

## 2025-07-29 RX ORDER — SODIUM PHOSPHATE, DIBASIC AND SODIUM PHOSPHATE, MONOBASIC 7; 19 G/230ML; G/230ML
1 ENEMA RECTAL ONCE
Status: COMPLETED | OUTPATIENT
Start: 2025-07-29 | End: 2025-07-29

## 2025-07-29 RX ORDER — SENNOSIDES 8.6 MG
17.2 TABLET ORAL NIGHTLY PRN
Status: DISCONTINUED | OUTPATIENT
Start: 2025-07-29 | End: 2025-07-31

## 2025-07-29 RX ORDER — SODIUM PHOSPHATE, DIBASIC AND SODIUM PHOSPHATE, MONOBASIC 7; 19 G/230ML; G/230ML
1 ENEMA RECTAL ONCE AS NEEDED
Status: DISCONTINUED | OUTPATIENT
Start: 2025-07-29 | End: 2025-07-31

## 2025-07-29 RX ORDER — SODIUM BICARBONATE 325 MG/1
325 TABLET ORAL AS NEEDED
COMMUNITY

## 2025-07-29 RX ORDER — ONDANSETRON 2 MG/ML
4 INJECTION INTRAMUSCULAR; INTRAVENOUS EVERY 6 HOURS PRN
Status: DISCONTINUED | OUTPATIENT
Start: 2025-07-29 | End: 2025-07-31

## 2025-07-29 RX ORDER — ECHINACEA PURPUREA EXTRACT 125 MG
1 TABLET ORAL
Status: DISCONTINUED | OUTPATIENT
Start: 2025-07-29 | End: 2025-07-31

## 2025-07-29 RX ORDER — FAMOTIDINE 20 MG/1
20 TABLET, FILM COATED ORAL 2 TIMES DAILY
Status: DISCONTINUED | OUTPATIENT
Start: 2025-07-29 | End: 2025-07-29

## 2025-07-29 RX ORDER — ENOXAPARIN SODIUM 100 MG/ML
40 INJECTION SUBCUTANEOUS DAILY
Status: DISCONTINUED | OUTPATIENT
Start: 2025-07-30 | End: 2025-07-31

## 2025-07-29 RX ORDER — IPRATROPIUM BROMIDE AND ALBUTEROL SULFATE 2.5; .5 MG/3ML; MG/3ML
3 SOLUTION RESPIRATORY (INHALATION) EVERY 4 HOURS PRN
COMMUNITY
Start: 2025-07-03

## 2025-07-29 RX ORDER — BISACODYL 10 MG
10 SUPPOSITORY, RECTAL RECTAL
COMMUNITY

## 2025-07-29 RX ORDER — METOPROLOL TARTRATE 50 MG
50 TABLET ORAL
Status: DISCONTINUED | OUTPATIENT
Start: 2025-07-29 | End: 2025-07-31

## 2025-07-30 LAB
ADENOVIRUS PCR:: NOT DETECTED
ATRIAL RATE: 95 BPM
B PARAPERT DNA SPEC QL NAA+PROBE: NOT DETECTED
B PERT DNA SPEC QL NAA+PROBE: NOT DETECTED
C PNEUM DNA SPEC QL NAA+PROBE: NOT DETECTED
CORONAVIRUS 229E PCR:: NOT DETECTED
CORONAVIRUS HKU1 PCR:: NOT DETECTED
CORONAVIRUS NL63 PCR:: NOT DETECTED
CORONAVIRUS OC43 PCR:: NOT DETECTED
FLUAV RNA SPEC QL NAA+PROBE: NOT DETECTED
FLUBV RNA SPEC QL NAA+PROBE: NOT DETECTED
GLUCOSE BLD-MCNC: 118 MG/DL (ref 70–99)
METAPNEUMOVIRUS PCR:: NOT DETECTED
MYCOPLASMA PNEUMONIA PCR:: NOT DETECTED
P AXIS: 49 DEGREES
P-R INTERVAL: 124 MS
PARAINFLUENZA 1 PCR:: DETECTED
PARAINFLUENZA 2 PCR:: NOT DETECTED
PARAINFLUENZA 3 PCR:: NOT DETECTED
PARAINFLUENZA 4 PCR:: NOT DETECTED
Q-T INTERVAL: 328 MS
QRS DURATION: 66 MS
QTC CALCULATION (BEZET): 412 MS
R AXIS: 62 DEGREES
RHINOVIRUS/ENTERO PCR:: NOT DETECTED
RSV RNA SPEC QL NAA+PROBE: NOT DETECTED
SARS-COV-2 RNA NPH QL NAA+NON-PROBE: NOT DETECTED
T AXIS: 73 DEGREES
VENTRICULAR RATE: 95 BPM

## 2025-07-30 PROCEDURE — 99232 SBSQ HOSP IP/OBS MODERATE 35: CPT | Performed by: INTERNAL MEDICINE

## 2025-07-30 RX ORDER — SODIUM BICARBONATE 650 MG/1
650 TABLET ORAL AS NEEDED
Status: DISCONTINUED | OUTPATIENT
Start: 2025-07-30 | End: 2025-07-31

## 2025-07-31 VITALS
TEMPERATURE: 100 F | OXYGEN SATURATION: 96 % | RESPIRATION RATE: 20 BRPM | WEIGHT: 132.19 LBS | BODY MASS INDEX: 24 KG/M2 | HEART RATE: 92 BPM | DIASTOLIC BLOOD PRESSURE: 31 MMHG | SYSTOLIC BLOOD PRESSURE: 123 MMHG

## 2025-07-31 LAB
GLUCOSE BLD-MCNC: 118 MG/DL (ref 70–99)
GLUCOSE BLD-MCNC: 135 MG/DL (ref 70–99)
GLUCOSE BLD-MCNC: 136 MG/DL (ref 70–99)

## 2025-07-31 PROCEDURE — 99239 HOSP IP/OBS DSCHRG MGMT >30: CPT | Performed by: INTERNAL MEDICINE

## (undated) DIAGNOSIS — S42.291A OTHER CLOSED DISPLACED FRACTURE OF PROXIMAL END OF RIGHT HUMERUS, INITIAL ENCOUNTER: Primary | ICD-10-CM

## (undated) DEVICE — STERILE POLYISOPRENE POWDER-FREE SURGICAL GLOVES: Brand: PROTEXIS

## (undated) DEVICE — KIT VLV 5 PC AIR H2O SUCT BX ENDOGATOR CONN

## (undated) DEVICE — ENDOPATH ULTRA VERESS INSUFFLATION NEEDLES WITH LUER LOCK CONNECTORS: Brand: ENDOPATH

## (undated) DEVICE — BITEBLOCK ENDOSCP 60FR MAXI STRP

## (undated) DEVICE — V2 SPECIMEN COLLECTION MANIFOLD KIT: Brand: NEPTUNE

## (undated) DEVICE — C-ARM: Brand: UNBRANDED

## (undated) DEVICE — SOLUTION  .9 1000ML BTL

## (undated) DEVICE — LEUKOPLAST ELASTIC STERILE 25X75MM TAN 100 1"X3": Brand: LEUKOPLAST®

## (undated) DEVICE — ZIPWIRE GUIDEWIRE .035X150 STR

## (undated) DEVICE — E-Z BUTTON SWITCH PENCIL

## (undated) DEVICE — TISSUE RETRIEVAL SYSTEM: Brand: INZII RETRIEVAL SYSTEM

## (undated) DEVICE — DRAIN CHANNEL 19FR BLAKE

## (undated) DEVICE — 10FT COMBINED O2 DELIVERY/CO2 MONITORING. FILTER WITH MICROSTREAM TYPE LUER: Brand: DUAL ADULT NASAL CANNULA

## (undated) DEVICE — DISPOSABLE LAPAROSCOPIC CLIP APPLIER WITH 20 CLIPS.: Brand: EPIX® UNIVERSAL CLIP APPLIER

## (undated) DEVICE — #11 STERILE BLADE: Brand: POLYMER COATED BLADES

## (undated) DEVICE — TROCAR: Brand: KII SHIELDED BLADED ACCESS SYSTEM

## (undated) DEVICE — UNDYED BRAIDED (POLYGLACTIN 910), SYNTHETIC ABSORBABLE SUTURE: Brand: COATED VICRYL

## (undated) DEVICE — 1200CC GUARDIAN II: Brand: GUARDIAN

## (undated) DEVICE — SHEET,DRAPE,40X58,STERILE: Brand: MEDLINE

## (undated) DEVICE — TOWEL SURG OR 17X30IN BLUE

## (undated) DEVICE — SLEEVE KENDALL SCD EXPRESS MED

## (undated) DEVICE — EVACUATOR URO RELIVAC 100CC

## (undated) DEVICE — SYRINGE 10ML LL TIP

## (undated) DEVICE — #10 STERILE BLADE: Brand: POLYMER COATED BLADES

## (undated) DEVICE — LIGHT HANDLE

## (undated) DEVICE — SYRINGE 30ML LL TIP

## (undated) DEVICE — 3M™ RED DOT™ MONITORING ELECTRODE WITH FOAM TAPE AND STICKY GEL, 50/BAG, 20/CASE, 72/PLT 2570: Brand: RED DOT™

## (undated) DEVICE — LAP CHOLE/APPY CDS-LF: Brand: MEDLINE INDUSTRIES, INC.

## (undated) DEVICE — KIT CUSTOM ENDOPROCEDURE STERIS

## (undated) DEVICE — 3M™ STERI-STRIP™ REINFORCED ADHESIVE SKIN CLOSURES, R1547, 1/2 IN X 4 IN (12 MM X 100 MM), 6 STRIPS/ENVELOPE: Brand: 3M™ STERI-STRIP™

## (undated) DEVICE — ENDOPATH 5MM CURVED SCISSORS WITH MONOPOLAR CAUTERY: Brand: ENDOPATH

## (undated) DEVICE — Device

## (undated) DEVICE — SUT VICRYL 0 UR-6 J603H

## (undated) DEVICE — TROCAR: Brand: KII® SLEEVE

## (undated) DEVICE — HEX-LOCKING BLADE ELECTRODE: Brand: EDGE

## (undated) DEVICE — TIGERTAIL 5F FLXTIP 70CM

## (undated) DEVICE — SUT VICRYL 0 J608H

## (undated) DEVICE — APPLICATOR CHLORAPREP 26ML

## (undated) DEVICE — ENDOPATH 5MM ENDOSCOPIC BLUNT TIP DISSECTORS (12 POUCHES CONTAINING 3 DISSECTORS EACH): Brand: ENDOPATH

## (undated) DEVICE — 40580 - THE PINK PAD - ADVANCED TRENDELENBURG POSITIONING KIT: Brand: 40580 - THE PINK PAD - ADVANCED TRENDELENBURG POSITIONING KIT

## (undated) NOTE — LETTER
10/09/19        Sultana Hess  [address] [place] [address] Quintin Master      Dear Tran Vila,    [facility] records indicate that you have outstanding lab work and or testing that was ordered for you and has not yet been completed:  Orders Placed This Encounte

## (undated) NOTE — LETTER
[address]  [address]  Authorization for Surgical Operation and Procedure     Date:___________                                                                                                         Time:__________  I hereby authorize Surgeon(s):  Kory Espinoza MD, my physician and his/her assistants (if applicable), which may include medical students, residents, and/or fellows, to perform the following surgical operation/ procedure and administer such anesthesia as may be determined necessary by my physician:  Operation/Procedure name (s) Esophagogastroduodenoscopy with Biopsy, Placement of Percutaneous Gastrostomy Tube on Sultana Hess   2.   I recognize that during the surgical operation/procedure, unforeseen conditions may necessitate additional or different procedures than those listed above.  I, therefore, further authorize and request that the above-named surgeon, assistants, or designees perform such procedures as are, in their judgment, necessary and desirable.    3.   My surgeon/physician has discussed prior to my surgery the potential benefits, risks and side effects of this procedure; the likelihood of achieving goals; and potential problems that might occur during recuperation.  They also discussed reasonable alternatives to the procedure, including risks, benefits, and side effects related to the alternatives and risks related to not receiving this procedure.  I have had all my questions answered and I acknowledge that no guarantee has been made as to the result that may be obtained.    4.   Should the need arise during my operation/procedure, which includes change of level of care prior to discharge, I also consent to the administration of blood and/or blood products.  Further, I understand that despite careful testing and screening of blood or blood products by collecting agencies, I may still be subject to ill effects as a result of receiving a blood  transfusion and/or blood products.  The following are some, but not all, of the potential risks that can occur: fever and allergic reactions, hemolytic reactions, transmission of diseases such as Hepatitis, AIDS and Cytomegalovirus (CMV) and fluid overload.  In the event that I wish to have an autologous transfusion of my own blood, or a directed donor transfusion, I will discuss this with my physician.  Check only if Refusing Blood or Blood Products  I understand refusal of blood or blood products as deemed necessary by my physician may have serious consequences to my condition to include possible death. I hereby assume responsibility for my refusal and release the hospital, its personnel, and my physicians from any responsibility for the consequences of my refusal.          o  Refuse      5.   I authorize the use of any specimen, organs, tissues, body parts or foreign objects that may be removed from my body during the operation/procedure for diagnosis, research or teaching purposes and their subsequent disposal by hospital authorities.  I also authorize the release of specimen test results and/or written reports to my treating physician on the hospital medical staff or other referring or consulting physicians involved in my care, at the discretion of the Pathologist or my treating physician.    6.   I consent to the photographing or videotaping of the operations or procedures to be performed, including appropriate portions of my body for medical, scientific, or educational purposes, provided my identity is not revealed by the pictures or by descriptive texts accompanying them.  If the procedure has been photographed/videotaped, the surgeon will obtain the original picture, image, videotape or CD.  The hospital will not be responsible for storage, release or maintenance of the picture, image, tape or CD.    7.   I consent to the presence of a  or observers in the operating room as deemed  necessary by my physician or their designees.    8.   I recognize that in the event my procedure results in extended X-Ray/fluoroscopy time, I may develop a skin reaction.    9. If I have a Do Not Attempt Resuscitation (DNAR) order in place, that status will be suspended while in the operating room, procedural suite, and during the recovery period unless otherwise explicitly stated by me (or a person authorized to consent on my behalf). The surgeon or my attending physician will determine when the applicable recovery period ends for purposes of reinstating the DNAR order.  10. Patients having a sterilization procedure: I understand that if the procedure is successful the results will be permanent and it will therefore be impossible for me to inseminate, conceive, or bear children.  I also understand that the procedure is intended to result in sterility, although the result has not been guaranteed.   11. I acknowledge that my physician has explained sedation/analgesia administration to me including the risk and benefits I consent to the administration of sedation/analgesia as may be necessary or desirable in the judgment of my physician.    I CERTIFY THAT I HAVE READ AND FULLY UNDERSTAND THE ABOVE CONSENT TO OPERATION and/or OTHER PROCEDURE.    _________________________________________  __________________________________  Signature of Patient     Signature of Responsible Person         ___________________________________         Printed Name of Responsible Person           _________________________________                 Relationship to Patient  _________________________________________  ______________________________  Signature of Witness          Date  Time      Patient Name: Sultana Hess     : 1935                 Printed: 2024     Medical Record #: [id number]                     Page 1 of 2                                    [address]   [id number]    Consent for Anesthesia    ISultana Hess agree to be cared for by an anesthesiologist, who is specially trained to monitor me and give me medicine to put me to sleep or keep me comfortable during my procedure    I understand that my anesthesiologist is not an employee or agent of [facility] or [employer]. He or she works for [employer].    As the patient asking for anesthesia services, I agree to:  Allow the anesthesiologist (anesthesia doctor) to give me medicine and do additional procedures as necessary. Some examples are: Starting or using an “IV” to give me medicine, fluids or blood during my procedure, and having a breathing tube placed to help me breathe when I’m asleep (intubation). In the event that my heart stops working properly, I understand that my anesthesiologist will make every effort to sustain my life, unless otherwise directed by [facility] Do Not Resuscitate documents.  Tell my anesthesia doctor before my procedure:  If I am pregnant.  The last time that I ate or drank.  All of the medicines I take (including prescriptions, herbal supplements, and pills I can buy without a prescription (including street drugs/illegal medications). Failure to inform my anesthesiologist about these medicines may increase my risk of anesthetic complications.  If I am allergic to anything or have had a reaction to anesthesia before.  I understand how the anesthesia medicine will help me (benefits).  I understand that with any type of anesthesia medicine there are risks:  The most common risks are: nausea, vomiting, sore throat, muscle soreness, damage to my eyes, mouth, or teeth (from breathing tube placement).  Rare risks include: remembering what happened during my procedure, allergic reactions to medications, injury to my airway, heart, lungs, vision, nerves, or muscles and in extremely rare instances death.  My doctor has explained to me other choices  available to me for my care (alternatives).  Pregnant Patients (“epidural”):  I understand that the risks of having an epidural (medicine given into my back to help control pain during labor), include itching, low blood pressure, difficulty urinating, headache or slowing of the baby’s heart. Very rare risks include infection, bleeding, seizure, irregular heart rhythms and nerve injury.  Regional Anesthesia (“spinal”, “epidural”, & “nerve blocks”):  I understand that rare but potential complications include headache, bleeding, infection, seizure, irregular heart rhythms, and nerve injury.    I can change my mind about having anesthesia services at any time before I get the medicine.    _____________________________________________________________________________  Patient (or Representative) Signature/Relationship to Patient  Date   Time    _____________________________________________________________________________   Name (if used)    Language/Organization   Time    _____________________________________________________________________________  Anesthesiologist Signature     Date   Time  I have discussed the procedure and information above with the patient (or patient’s representative) and answered their questions. The patient or their representative has agreed to have anesthesia services.    _____________________________________________________________________________  Witness        Date   Time  I have verified that the signature is that of the patient or patient’s representative, and that it was signed before the procedure  Patient Name: Sultana Hess     : 1935                 Printed: 2024     Medical Record #: [id number]                     Page 2 of 2

## (undated) NOTE — IP AVS SNAPSHOT
[address]            (For Outpatient Use Only) Initial Admit Date: 10/3/2022   Inpt/Obs Admit Date: Inpt: N/A / Obs: 10/03/22   Discharge Date:    Hospital Acct:  [de-identified]   MRN: [de-identified]   CSN: [id number]   CEID: [id number]        ENCOUNTER  Patient Class: Observation Admitting Provider: Cici Ramos DO [facility] Service: Surgical Attending Provider: Ariel Laws MD   Bed: 332-A   Visit Type:   Referring Physician: No ref. provider found Billing Flag:    Admit Diagnosis: Abnormal CT of the abdomen [R93.5]      PATIENT  Legal Name:   Effie Persaud    Legal Sex: Female  Gender ID: Female             Pref Name:   Tammy Alexander PCP:  Marcella Olvera, [address] [place] [address] Drive: [phone]   Address:  [facility] LISET Mcallister [facility] : 1935 (87 yrs) Mobile: [phone]         City/State/Zip: [address] [place] [address] Marital:  Language: [address] [place] [address] Drive: [facility] [place]: [id number] Orthodoxy: Wishes Privacy     Race:  Ethnicity: Non  Or [address] [place] [address] Street   Name Relationship Legal Guardian? Home Phone Work Phone Mobile Phone   1. Jimenez Hess  2.  Edith Varela Son  Daughter      [phone]     GUARANTOR  Guarantor: Vinh Mueller : 1935 Home Phone: [phone]   Address: [facility] LISET Till[facility]  Sex: Female Work Phone:    City/State/Zip: [address] [place] [address]   Rel. to Patient: Self Guarantor ID: [id number]   GUARANTOR EMPLOYER   Employer:  Status: RETIRED     COVERAGE  PRIMARY INSURANCE   Payor: MEDICARE Plan: MEDICARE PART B ONLY   Group Number:  Insurance Type: INDEMNITY   Subscriber Name: Nelma Spurling : 1935   Subscriber ID: [id number] Pt Rel to Subscriber: Self   SECONDARY INSURANCE   Payor: MEDICAID Plan: MEDICAID   Group Number: [id number] Insurance Type: [id number] Name: Nelma Spurling : 1935   Subscriber ID: [id number] Pt Rel to Subscriber: SELF   TERTIARY INSURANCE   Payor:  Plan: Group Number:  Insurance Type:    Subscriber Name:  Subscriber :    Subscriber ID:  Pt Rel to Subscriber:    Hospital Account Financial Class: Medicare    2022

## (undated) NOTE — LETTER
22    Patient: Cari Otto  : 1935 Visit date: 2022    Dear  Adrián Estrada    Thank you for referring Cari Otto to my practice. Please find my assessment and plan below.           Sincerely,       Carrie Nino MD   CC: No Recipients

## (undated) NOTE — LETTER
November 8, 2019    [address] [facility] [address]  [address]    Dear Gavin Hollins: It was a pleasure speaking with you over the phone recently.  To follow up, I wanted to send you some contact information to utilize when you have a q

## (undated) NOTE — LETTER
[address]  [address]  Consent for Procedure/Sedation  Date: 11/22/24         Time: 1200    I hereby authorize Dr Hinojosa, my physician and his/her assistants (if applicable), which may include medical students, residents, and/or fellows, to perform the following surgical operation/ procedure and administer such anesthesia as may be determined necessary by my physician: Gastrostomy Tube Insertion on Sultana Hess  2.   I recognize that during the surgical operation/procedure, unforeseen conditions may necessitate additional or different procedures than those listed above.  I, therefore, further authorize and request that the above-named surgeon, assistants, or designees perform such procedures as are, in their judgment, necessary and desirable.    3.   My surgeon/physician has discussed prior to my surgery the potential benefits, risks and side effects of this procedure; the likelihood of achieving goals; and potential problems that might occur during recuperation.  They also discussed reasonable alternatives to the procedure, including risks, benefits, and side effects related to the alternatives and risks related to not receiving this procedure.  I have had all my questions answered and I acknowledge that no guarantee has been made as to the result that may be obtained.    4.   Should the need arise during my operation/procedure, which includes change of level of care prior to discharge, I also consent to the administration of blood and/or blood products.  Further, I understand that despite careful testing and screening of blood or blood products by collecting agencies, I may still be subject to ill effects as a result of receiving a blood transfusion and/or blood products.  The following are some, but not all, of the potential risks that can occur: fever and allergic reactions, hemolytic reactions, transmission of diseases such as Hepatitis, AIDS and Cytomegalovirus (CMV)  and fluid overload.  In the event that I wish to have an autologous transfusion of my own blood, or a directed donor transfusion, I will discuss this with my physician.   Check only if Refusing Blood or Blood Products  I understand refusal of blood or blood products as deemed necessary by my physician may have serious consequences to my condition to include possible death. I hereby assume responsibility for my refusal and release the hospital, its personnel, and my physicians from any responsibility for the consequences of my refusal.         o  Refuse         5.   I authorize the use of any specimen, organs, tissues, body parts or foreign objects that may be removed from my body during the operation/procedure for diagnosis, research or teaching purposes and their subsequent disposal by hospital authorities.  I also authorize the release of specimen test results and/or written reports to my treating physician on the hospital medical staff or other referring or consulting physicians involved in my care, at the discretion of the Pathologist or my treating physician.    6.   I consent to the photographing or videotaping of the operations or procedures to be performed, including appropriate portions of my body for medical, scientific, or educational purposes, provided my identity is not revealed by the pictures or by descriptive texts accompanying them.  If the procedure has been photographed/videotaped, the surgeon will obtain the original picture, image, videotape or CD.  The hospital will not be responsible for storage, release or maintenance of the picture, image, tape or CD.    7.   I consent to the presence of a  or observers in the operating room as deemed necessary by my physician or their designees.    8.   I recognize that in the event my procedure results in extended X-Ray/fluoroscopy time, I may develop a skin reaction.    9. If I have a Do Not Attempt Resuscitation (DNAR) order in place,  that status will be suspended while in the operating room, procedural suite, and during the recovery period unless otherwise explicitly stated by me (or a person authorized to consent on my behalf). The surgeon or my attending physician will determine when the applicable recovery period ends for purposes of reinstating the DNAR order.  10. Patients having a sterilization procedure: I understand that if the procedure is successful the results will be permanent and it will therefore be impossible for me to inseminate, conceive, or bear children.  I also understand that the procedure is intended to result in sterility, although the result has not been guaranteed.   11. I acknowledge that my physician has explained sedation/analgesia administration to me including the risk and benefits I consent to the administration of sedation/analgesia as may be necessary or desirable in the judgment of my physician.    I CERTIFY THAT I HAVE READ AND FULLY UNDERSTAND THE ABOVE CONSENT TO OPERATION and/or OTHER PROCEDURE.        ____________________________________       _________________________________      ______________________________  Signature of Patient         Signature of Responsible Person        Printed Name of Responsible Person        ____________________________________      _________________________________      ______________________________       Signature of Witness          Relationship to Patient                       Date                                       Time  Patient Name: Sultana Hess  : 1935    Reviewed: 2024   Printed: 2024  Medical Record #: [id number] Page 1 of 1